# Patient Record
Sex: FEMALE | Race: WHITE | Employment: FULL TIME | URBAN - METROPOLITAN AREA
[De-identification: names, ages, dates, MRNs, and addresses within clinical notes are randomized per-mention and may not be internally consistent; named-entity substitution may affect disease eponyms.]

---

## 2017-01-06 ENCOUNTER — HOSPITAL ENCOUNTER (OUTPATIENT)
Dept: MRI IMAGING | Facility: HOSPITAL | Age: 42
Discharge: HOME | End: 2017-01-06
Attending: PHYSICIAN ASSISTANT
Payer: COMMERCIAL

## 2017-01-06 DIAGNOSIS — M54.2 NECK PAIN: ICD-10-CM

## 2017-01-06 PROCEDURE — 72141 MRI NECK SPINE W/O DYE: CPT

## 2017-01-16 ENCOUNTER — HOSPITAL ENCOUNTER (EMERGENCY)
Facility: HOSPITAL | Age: 42
Discharge: HOME | End: 2017-01-16
Attending: EMERGENCY MEDICINE
Payer: COMMERCIAL

## 2017-01-16 VITALS
RESPIRATION RATE: 16 BRPM | SYSTOLIC BLOOD PRESSURE: 133 MMHG | OXYGEN SATURATION: 100 % | HEART RATE: 71 BPM | DIASTOLIC BLOOD PRESSURE: 92 MMHG

## 2017-01-16 DIAGNOSIS — G89.29 CHRONIC MIDLINE LOW BACK PAIN WITHOUT SCIATICA: Primary | ICD-10-CM

## 2017-01-16 DIAGNOSIS — M54.50 CHRONIC MIDLINE LOW BACK PAIN WITHOUT SCIATICA: Primary | ICD-10-CM

## 2017-01-16 PROCEDURE — 99283 EMERGENCY DEPT VISIT LOW MDM: CPT

## 2017-01-16 RX ORDER — HYDROCODONE BITARTRATE AND ACETAMINOPHEN 5; 325 MG/1; MG/1
1-2 TABLET ORAL EVERY 6 HOURS PRN
Qty: 20 TABLET | Refills: 0 | Status: SHIPPED | OUTPATIENT
Start: 2017-01-16 | End: 2017-01-23

## 2017-01-16 RX ORDER — GABAPENTIN 300 MG/1
300 CAPSULE ORAL 3 TIMES DAILY
COMMUNITY
End: 2017-07-05

## 2017-01-16 RX ORDER — HYDROCODONE BITARTRATE AND ACETAMINOPHEN 5; 325 MG/1; MG/1
2 TABLET ORAL ONCE
Status: COMPLETED | OUTPATIENT
Start: 2017-01-16 | End: 2017-01-16

## 2017-01-16 NOTE — ED PROVIDER NOTES
I reviewed that chart and discussed the case with the physician assistant. I have examined the patient and noted patient will be given a few pain medications. She definitely needs to follow-up with her pain physician. .    I agree with the physician radha

## 2017-01-16 NOTE — ED INITIAL ASSESSMENT (HPI)
Pt with a hx of chronic back pain, two bulging discs.  Pt presents today with increased back pain after failed epidural.

## 2017-01-16 NOTE — ED PROVIDER NOTES
Patient Seen in: [facility] Emergency Department    History   Patient presents with:  Back Pain (musculoskeletal)    Stated Complaint: back pain    HPI    The patient is a 19-year-old female who presents today for chronic back pain and neck pain.   [place] mouth daily with breakfast.   celecoxib (CELEBREX) 200 MG Oral Cap,  Take 1 capsule (200 mg total) by mouth 2 (two) times daily. Cetirizine HCl (ZYRTEC OR),  Take  by mouth.        Family History   Problem Relation Age of Onset   • Arthritis [facility] [place] exhibits tenderness. Negative straight leg raise bilaterally. Patient is moving freely on exam table. Neurological: She is alert and oriented to person, place, and time. No cranial nerve deficit. She exhibits normal muscle tone.  Coordination normal.

## 2017-01-17 ENCOUNTER — HOSPITAL (OUTPATIENT)
Dept: OTHER | Age: 42
End: 2017-01-17
Attending: INTERNAL MEDICINE

## 2017-02-20 ENCOUNTER — HOSPITAL ENCOUNTER (OUTPATIENT)
Dept: GENERAL RADIOLOGY | Facility: HOSPITAL | Age: 42
Discharge: HOME | End: 2017-02-20
Attending: SURGERY
Payer: COMMERCIAL

## 2017-02-20 ENCOUNTER — LAB ENCOUNTER (OUTPATIENT)
Dept: LAB | Facility: HOSPITAL | Age: 42
End: 2017-02-20
Attending: SURGERY
Payer: COMMERCIAL

## 2017-02-20 DIAGNOSIS — E78.5 DYSLIPIDEMIA: Primary | ICD-10-CM

## 2017-02-20 DIAGNOSIS — M25.561 BILATERAL KNEE PAIN: ICD-10-CM

## 2017-02-20 DIAGNOSIS — M25.562 BILATERAL KNEE PAIN: ICD-10-CM

## 2017-02-20 DIAGNOSIS — M79.10 MYALGIA: ICD-10-CM

## 2017-02-20 DIAGNOSIS — E55.9 VITAMIN D DEFICIENCY: ICD-10-CM

## 2017-02-20 DIAGNOSIS — M25.561 KNEE PAIN, RIGHT: ICD-10-CM

## 2017-02-20 DIAGNOSIS — M25.562 LEFT KNEE PAIN: ICD-10-CM

## 2017-02-20 LAB
25-HYDROXYVITAMIN D (TOTAL): 27 NG/ML (ref 30–100)
ALBUMIN SERPL-MCNC: 3.3 G/DL (ref 3.5–4.8)
ALP LIVER SERPL-CCNC: 97 U/L (ref 37–98)
ALT SERPL-CCNC: 29 U/L (ref 14–54)
AST SERPL-CCNC: 11 U/L (ref 15–41)
BASOPHILS # BLD AUTO: 0.08 X10(3) UL (ref 0–0.1)
BASOPHILS NFR BLD AUTO: 0.9 %
BILIRUB SERPL-MCNC: 0.3 MG/DL (ref 0.1–2)
BUN BLD-MCNC: 14 MG/DL (ref 8–20)
CALCIUM BLD-MCNC: 8.2 MG/DL (ref 8.3–10.3)
CHLORIDE: 108 MMOL/L (ref 101–111)
CHOLEST SMN-MCNC: 176 MG/DL (ref ?–200)
CO2: 26 MMOL/L (ref 22–32)
CREAT BLD-MCNC: 0.67 MG/DL (ref 0.55–1.02)
DEPRECATED HBV CORE AB SER IA-ACNC: 9.9 NG/ML (ref 10–291)
EOSINOPHIL # BLD AUTO: 0.12 X10(3) UL (ref 0–0.3)
EOSINOPHIL NFR BLD AUTO: 1.4 %
ERYTHROCYTE [DISTWIDTH] IN BLOOD BY AUTOMATED COUNT: 13.5 % (ref 11.5–16)
FOLATE (FOLIC ACID), SERUM: 9.3 NG/ML (ref 8.7–24)
GLUCOSE BLD-MCNC: 83 MG/DL (ref 70–99)
HAV AB SERPL IA-ACNC: 544 PG/ML (ref 193–986)
HCT VFR BLD AUTO: 40.9 % (ref 34–50)
HDLC SERPL-MCNC: 60 MG/DL (ref 45–?)
HDLC SERPL: 2.93 {RATIO} (ref ?–4.44)
HGB BLD-MCNC: 13.1 G/DL (ref 12–16)
IMMATURE GRANULOCYTE COUNT: 0.03 X10(3) UL (ref 0–1)
IMMATURE GRANULOCYTE RATIO %: 0.4 %
IRON SATURATION: 14 % (ref 13–45)
IRON: 53 UG/DL (ref 28–170)
LDLC SERPL CALC-MCNC: 99 MG/DL (ref ?–130)
LYMPHOCYTES # BLD AUTO: 2.38 X10(3) UL (ref 0.9–4)
LYMPHOCYTES NFR BLD AUTO: 27.9 %
M PROTEIN MFR SERPL ELPH: 7 G/DL (ref 6.1–8.3)
MCH RBC QN AUTO: 27 PG (ref 27–33.2)
MCHC RBC AUTO-ENTMCNC: 32 G/DL (ref 31–37)
MCV RBC AUTO: 84.3 FL (ref 81–100)
MONOCYTES # BLD AUTO: 0.53 X10(3) UL (ref 0.1–0.6)
MONOCYTES NFR BLD AUTO: 6.2 %
NEUTROPHIL ABS PRELIM: 5.39 X10 (3) UL (ref 1.3–6.7)
NEUTROPHILS # BLD AUTO: 5.39 X10(3) UL (ref 1.3–6.7)
NEUTROPHILS NFR BLD AUTO: 63.2 %
NONHDLC SERPL-MCNC: 116 MG/DL (ref ?–130)
PHOSPHATE SERPL-MCNC: 3.8 MG/DL (ref 2.5–4.9)
PLATELET # BLD AUTO: 254 10(3)UL (ref 150–450)
POTASSIUM SERPL-SCNC: 3.6 MMOL/L (ref 3.6–5.1)
PTH-INTACT SERPL-MCNC: 103.7 PG/ML (ref 11.1–79.5)
RBC # BLD AUTO: 4.85 X10(6)UL (ref 3.8–5.1)
RED CELL DISTRIBUTION WIDTH-SD: 41.6 FL (ref 35.1–46.3)
SODIUM SERPL-SCNC: 142 MMOL/L (ref 136–144)
TOTAL IRON BINDING CAPACITY: 380 UG/DL (ref 298–536)
TRANSFERRIN: 255 MG/DL (ref 200–360)
TRIGLYCERIDES: 86 MG/DL (ref ?–150)
TSI SER-ACNC: 1.41 MIU/ML (ref 0.35–5.5)
VLDL: 17 MG/DL (ref 5–40)
WBC # BLD AUTO: 8.5 X10(3) UL (ref 4–13)

## 2017-02-20 PROCEDURE — 83970 ASSAY OF PARATHORMONE: CPT

## 2017-02-20 PROCEDURE — 73560 X-RAY EXAM OF KNEE 1 OR 2: CPT

## 2017-02-20 PROCEDURE — 82607 VITAMIN B-12: CPT

## 2017-02-20 PROCEDURE — 82728 ASSAY OF FERRITIN: CPT

## 2017-02-20 PROCEDURE — 36415 COLL VENOUS BLD VENIPUNCTURE: CPT

## 2017-02-20 PROCEDURE — 85025 COMPLETE CBC W/AUTO DIFF WBC: CPT

## 2017-02-20 PROCEDURE — 80053 COMPREHEN METABOLIC PANEL: CPT

## 2017-02-20 PROCEDURE — 82652 VIT D 1 25-DIHYDROXY: CPT

## 2017-02-20 PROCEDURE — 83550 IRON BINDING TEST: CPT

## 2017-02-20 PROCEDURE — 84443 ASSAY THYROID STIM HORMONE: CPT

## 2017-02-20 PROCEDURE — 83540 ASSAY OF IRON: CPT

## 2017-02-20 PROCEDURE — 82306 VITAMIN D 25 HYDROXY: CPT

## 2017-02-20 PROCEDURE — 82746 ASSAY OF FOLIC ACID SERUM: CPT

## 2017-02-20 PROCEDURE — 80061 LIPID PANEL: CPT

## 2017-02-20 PROCEDURE — 84425 ASSAY OF VITAMIN B-1: CPT

## 2017-02-20 PROCEDURE — 84100 ASSAY OF PHOSPHORUS: CPT

## 2017-02-22 LAB — 1,25-DIHYDROXYVITAMIN D: 85 PG/ML

## 2017-02-23 LAB — VITAMIN B1 (THIAMINE), WHOLE B: 129 NMOL/L

## 2017-02-24 PROBLEM — M79.10 MYALGIA: Status: ACTIVE | Noted: 2017-02-24

## 2017-03-10 ENCOUNTER — HOSPITAL ENCOUNTER (OUTPATIENT)
Dept: ULTRASOUND IMAGING | Facility: HOSPITAL | Age: 42
Discharge: HOME | End: 2017-03-10
Attending: OBSTETRICS & GYNECOLOGY
Payer: COMMERCIAL

## 2017-03-10 DIAGNOSIS — N94.6 DYSMENORRHEA: ICD-10-CM

## 2017-03-10 DIAGNOSIS — R10.32 ABDOMINAL PAIN, LEFT LOWER QUADRANT: ICD-10-CM

## 2017-03-10 DIAGNOSIS — D25.1 INTRAMURAL LEIOMYOMA OF UTERUS: ICD-10-CM

## 2017-03-10 PROCEDURE — 76830 TRANSVAGINAL US NON-OB: CPT

## 2017-03-10 PROCEDURE — 76856 US EXAM PELVIC COMPLETE: CPT

## 2017-03-26 ENCOUNTER — HOSPITAL ENCOUNTER (EMERGENCY)
Facility: HOSPITAL | Age: 42
Discharge: HOME | End: 2017-03-26
Attending: EMERGENCY MEDICINE
Payer: COMMERCIAL

## 2017-03-26 VITALS
DIASTOLIC BLOOD PRESSURE: 75 MMHG | TEMPERATURE: 98 F | HEART RATE: 88 BPM | SYSTOLIC BLOOD PRESSURE: 118 MMHG | OXYGEN SATURATION: 100 % | HEIGHT: 67.99 IN | WEIGHT: 293 LBS | BODY MASS INDEX: 44.41 KG/M2 | RESPIRATION RATE: 20 BRPM

## 2017-03-26 DIAGNOSIS — G89.29 CHRONIC MIDLINE LOW BACK PAIN WITHOUT SCIATICA: Primary | ICD-10-CM

## 2017-03-26 DIAGNOSIS — M54.50 CHRONIC MIDLINE LOW BACK PAIN WITHOUT SCIATICA: Primary | ICD-10-CM

## 2017-03-26 DIAGNOSIS — N39.0 URINARY TRACT INFECTION, SITE UNSPECIFIED: ICD-10-CM

## 2017-03-26 LAB
BILIRUB UR QL STRIP.AUTO: NEGATIVE
COLOR UR AUTO: YELLOW
GLUCOSE UR STRIP.AUTO-MCNC: NEGATIVE MG/DL
KETONES UR STRIP.AUTO-MCNC: NEGATIVE MG/DL
NITRITE UR QL STRIP.AUTO: NEGATIVE
PH UR STRIP.AUTO: 6 [PH] (ref 4.5–8)
POCT URINE PREGNANCY: NEGATIVE
PROT UR STRIP.AUTO-MCNC: NEGATIVE MG/DL
RBC UR QL AUTO: NEGATIVE
SP GR UR STRIP.AUTO: 1.01 (ref 1–1.03)
UROBILINOGEN UR STRIP.AUTO-MCNC: 4 MG/DL

## 2017-03-26 PROCEDURE — 81025 URINE PREGNANCY TEST: CPT

## 2017-03-26 PROCEDURE — 81001 URINALYSIS AUTO W/SCOPE: CPT | Performed by: EMERGENCY MEDICINE

## 2017-03-26 PROCEDURE — 87086 URINE CULTURE/COLONY COUNT: CPT | Performed by: EMERGENCY MEDICINE

## 2017-03-26 PROCEDURE — 99283 EMERGENCY DEPT VISIT LOW MDM: CPT

## 2017-03-26 RX ORDER — HYDROCODONE BITARTRATE AND ACETAMINOPHEN 5; 325 MG/1; MG/1
1 TABLET ORAL EVERY 4 HOURS PRN
Qty: 20 TABLET | Refills: 0 | Status: SHIPPED | OUTPATIENT
Start: 2017-03-26 | End: 2017-04-03 | Stop reason: ALTCHOICE

## 2017-03-26 RX ORDER — SULFAMETHOXAZOLE AND TRIMETHOPRIM 800; 160 MG/1; MG/1
1 TABLET ORAL 2 TIMES DAILY
Qty: 6 TABLET | Refills: 0 | Status: SHIPPED | OUTPATIENT
Start: 2017-03-26 | End: 2017-03-29

## 2017-03-26 NOTE — ED PROVIDER NOTES
Patient Seen in: [facility] Emergency Department    History   Patient presents with:  Back Pain (musculoskeletal)    Stated Complaint: lower back pain    HPI    Patient is a pleasant 19-year-old female, presenting for evaluation of low back pain.   P by mouth 2 (two) times daily. PEG 3350-KCl-NaBcb-NaCl-NaSulf (GAVILYTE-C) 240 g Oral Recon Soln,  Use as directed   Etodolac 500 MG Oral Tab,  Take 1 tablet (500 mg total) by mouth 2 (two) times daily.    Cholecalciferol (VITAMIN D) 2000 units Oral Cap, awake and alert, sitting on the cart, in no apparent distress. HEENT: Head is without evidence of trauma. Oropharynx is pink and moist.  NECK: Neck is supple. The trachea is midline. LUNGS: Respirations are unlabored. HEART: Regular rate and rhythm. diagnosis)  Urinary tract infection, site unspecified    Disposition:  Discharge    Follow-up:  Rachael Tucker 46  [phone]    Schedule an appointment as soon as possible for a visit in 3 days        [address] [place] [address]

## 2017-03-26 NOTE — ED INITIAL ASSESSMENT (HPI)
Lower back pain x2 days. Pt states has chronic back problems. 2 bulging discs in lower back. Pt sees pain specialist.  Had epidural done 2 months ago. Pt states epidural no longer working. Has tramadol also.

## 2017-03-27 ENCOUNTER — HOSPITAL ENCOUNTER (EMERGENCY)
Facility: HOSPITAL | Age: 42
Discharge: HOME | End: 2017-03-27
Attending: EMERGENCY MEDICINE
Payer: COMMERCIAL

## 2017-03-27 VITALS
DIASTOLIC BLOOD PRESSURE: 80 MMHG | SYSTOLIC BLOOD PRESSURE: 121 MMHG | OXYGEN SATURATION: 97 % | TEMPERATURE: 97 F | WEIGHT: 293 LBS | HEART RATE: 106 BPM | BODY MASS INDEX: 44.41 KG/M2 | HEIGHT: 68 IN | RESPIRATION RATE: 20 BRPM

## 2017-03-27 DIAGNOSIS — M54.50 ACUTE BILATERAL LOW BACK PAIN WITHOUT SCIATICA: Primary | ICD-10-CM

## 2017-03-27 PROCEDURE — 99283 EMERGENCY DEPT VISIT LOW MDM: CPT

## 2017-03-27 RX ORDER — CYCLOBENZAPRINE HCL 10 MG
10 TABLET ORAL 3 TIMES DAILY PRN
Qty: 20 TABLET | Refills: 0 | Status: SHIPPED | OUTPATIENT
Start: 2017-03-27 | End: 2017-04-03

## 2017-03-27 RX ORDER — HYDROCODONE BITARTRATE AND ACETAMINOPHEN 10; 325 MG/1; MG/1
1 TABLET ORAL EVERY 4 HOURS PRN
Qty: 40 TABLET | Refills: 0 | Status: SHIPPED | OUTPATIENT
Start: 2017-03-27 | End: 2017-04-01

## 2017-03-28 NOTE — ED PROVIDER NOTES
Patient Seen in: [facility] Emergency Department    History   Patient presents with:  Back Pain (musculoskeletal)    Stated Complaint: BACK PAIN    HPI    44-year-old female presents to the emergency department for evaluation of nonradiating low ronak Take 1 tablet by mouth every 4 (four) hours as needed for Pain. Sulfamethoxazole-TMP -160 MG Oral Tab per tablet,  Take 1 tablet by mouth 2 (two) times daily.    PEG 3350-KCl-NaBcb-NaCl-NaSulf (GAVILYTE-C) 240 g Oral Recon Soln,  Use as directed   E 97%  LMP 03/04/2017        Physical Exam    General appearance: This is a middle-aged female lying in a gurney. Vital signs were reviewed per nurse's notes. HEENT: Normocephalic atraumatic. Anicteric sclera.   Oral mucosa is moist.  Oropharynx is normal.

## 2017-03-28 NOTE — ED INITIAL ASSESSMENT (HPI)
Arrives with c/o low back pain. Was seen here a couple of days ago, given Norco 5/325 but states had to double up on them and is now out. Had an epidural a couple of months ago without relief. No pain down legs. No loss of control of bowel or bladder.

## 2017-03-30 PROCEDURE — 86812 HLA TYPING A B OR C: CPT | Performed by: INTERNAL MEDICINE

## 2017-04-03 PROBLEM — G47.33 OSA (OBSTRUCTIVE SLEEP APNEA): Status: ACTIVE | Noted: 2017-04-03

## 2017-04-11 PROBLEM — M79.18 MYOFASCIAL PAIN SYNDROME: Status: ACTIVE | Noted: 2017-04-11

## 2017-04-11 PROBLEM — M62.81 QUADRICEPS WEAKNESS: Status: ACTIVE | Noted: 2017-04-11

## 2017-04-11 PROBLEM — R29.898 WEAKNESS OF RIGHT HIP: Status: ACTIVE | Noted: 2017-04-11

## 2017-04-12 ENCOUNTER — HOSPITAL ENCOUNTER (EMERGENCY)
Facility: HOSPITAL | Age: 42
Discharge: HOME | End: 2017-04-12
Attending: EMERGENCY MEDICINE
Payer: COMMERCIAL

## 2017-04-12 VITALS
RESPIRATION RATE: 14 BRPM | SYSTOLIC BLOOD PRESSURE: 107 MMHG | TEMPERATURE: 98 F | DIASTOLIC BLOOD PRESSURE: 66 MMHG | HEART RATE: 105 BPM | WEIGHT: 293 LBS | BODY MASS INDEX: 44.41 KG/M2 | OXYGEN SATURATION: 96 % | HEIGHT: 68 IN

## 2017-04-12 DIAGNOSIS — M25.569 ACUTE KNEE PAIN, UNSPECIFIED LATERALITY: ICD-10-CM

## 2017-04-12 DIAGNOSIS — G89.29 ACUTE EXACERBATION OF CHRONIC LOW BACK PAIN: Primary | ICD-10-CM

## 2017-04-12 DIAGNOSIS — M54.50 ACUTE EXACERBATION OF CHRONIC LOW BACK PAIN: Primary | ICD-10-CM

## 2017-04-12 PROCEDURE — 96372 THER/PROPH/DIAG INJ SC/IM: CPT

## 2017-04-12 PROCEDURE — 99283 EMERGENCY DEPT VISIT LOW MDM: CPT

## 2017-04-12 RX ORDER — HYDROCODONE BITARTRATE AND ACETAMINOPHEN 5; 325 MG/1; MG/1
1-2 TABLET ORAL EVERY 4 HOURS PRN
Qty: 4 TABLET | Refills: 0 | Status: SHIPPED | OUTPATIENT
Start: 2017-04-12 | End: 2017-04-22

## 2017-04-12 RX ORDER — HYDROCODONE BITARTRATE AND ACETAMINOPHEN 5; 325 MG/1; MG/1
1-2 TABLET ORAL EVERY 4 HOURS PRN
Qty: 20 TABLET | Refills: 0 | OUTPATIENT
Start: 2017-04-12 | End: 2017-04-12

## 2017-04-12 RX ORDER — DIAZEPAM 5 MG/1
TABLET ORAL EVERY 8 HOURS PRN
Qty: 20 TABLET | Refills: 0 | Status: SHIPPED | OUTPATIENT
Start: 2017-04-12 | End: 2017-04-22

## 2017-04-12 RX ORDER — KETOROLAC TROMETHAMINE 30 MG/ML
60 INJECTION, SOLUTION INTRAMUSCULAR; INTRAVENOUS ONCE
Status: COMPLETED | OUTPATIENT
Start: 2017-04-12 | End: 2017-04-12

## 2017-04-12 NOTE — ED INITIAL ASSESSMENT (HPI)
Pt with ongoing pain of the knee and back pain. Pt was seen at pain clinic and received a shot in her knee for pain but her back is really bothering her now so here for pain relief.

## 2017-04-13 NOTE — ED PROVIDER NOTES
Patient Seen in: [facility] Emergency Department    History   Patient presents with:  Back Pain (musculoskeletal)  Lower Extremity Injury (musculoskeletal)    Stated Complaint: back and rt knee pain    HPI    Patient has some chronic knee and back p Oral Recon Soln,  Use as directed   Cholecalciferol (VITAMIN D) 2000 units Oral Cap,  Take by mouth daily. Acidophilus/Pectin Oral Cap,  Take 1 capsule by mouth daily. Lurasidone HCl 20 MG Oral Tab,  Take 60 mg by mouth daily.    Omeprazole 40 MG Oral C lymphadenopathy, no tenderness, swelling, deformity   Cardiovascular: Regular rate and rhythm, normal S1 and S2, no murmurs, rubs, or gallops   Lungs: Clear to auscultation bilaterally with equal breath sounds, no wheezing, no rhonchi   Abdomen: Positive b both see specialists in that area.         Disposition and Plan     Clinical Impression:  Acute exacerbation of chronic low back pain  (primary encounter diagnosis)  Acute knee pain, unspecified laterality    Disposition:  Discharge    Follow-up:  Ami Cramer

## 2017-04-22 ENCOUNTER — LAB ENCOUNTER (OUTPATIENT)
Dept: LAB | Facility: HOSPITAL | Age: 42
End: 2017-04-22
Attending: OBSTETRICS & GYNECOLOGY
Payer: COMMERCIAL

## 2017-04-22 ENCOUNTER — HOSPITAL ENCOUNTER (OUTPATIENT)
Dept: GENERAL RADIOLOGY | Facility: HOSPITAL | Age: 42
Discharge: HOME | End: 2017-04-22
Attending: INTERNAL MEDICINE
Payer: COMMERCIAL

## 2017-04-22 ENCOUNTER — APPOINTMENT (OUTPATIENT)
Dept: LAB | Facility: HOSPITAL | Age: 42
End: 2017-04-22
Payer: COMMERCIAL

## 2017-04-22 DIAGNOSIS — N92.0 MENORRHAGIA: ICD-10-CM

## 2017-04-22 DIAGNOSIS — M25.50 ARTHRALGIA, UNSPECIFIED JOINT: ICD-10-CM

## 2017-04-22 DIAGNOSIS — N94.6 DYSMENORRHEA: ICD-10-CM

## 2017-04-22 DIAGNOSIS — D25.9 FIBROID UTERUS: ICD-10-CM

## 2017-04-22 PROCEDURE — 36415 COLL VENOUS BLD VENIPUNCTURE: CPT

## 2017-04-22 PROCEDURE — 73130 X-RAY EXAM OF HAND: CPT

## 2017-04-22 PROCEDURE — 73630 X-RAY EXAM OF FOOT: CPT

## 2017-04-22 PROCEDURE — 93010 ELECTROCARDIOGRAM REPORT: CPT | Performed by: INTERNAL MEDICINE

## 2017-04-22 PROCEDURE — 85025 COMPLETE CBC W/AUTO DIFF WBC: CPT

## 2017-04-22 PROCEDURE — 86900 BLOOD TYPING SEROLOGIC ABO: CPT

## 2017-04-22 PROCEDURE — 86901 BLOOD TYPING SEROLOGIC RH(D): CPT

## 2017-04-22 PROCEDURE — 86850 RBC ANTIBODY SCREEN: CPT

## 2017-04-22 PROCEDURE — 93005 ELECTROCARDIOGRAM TRACING: CPT

## 2017-05-05 ENCOUNTER — ANESTHESIA (OUTPATIENT)
Dept: SURGERY | Facility: HOSPITAL | Age: 42
End: 2017-05-05
Payer: COMMERCIAL

## 2017-05-05 ENCOUNTER — SURGERY (OUTPATIENT)
Age: 42
End: 2017-05-05

## 2017-05-05 ENCOUNTER — HOSPITAL ENCOUNTER (OUTPATIENT)
Facility: HOSPITAL | Age: 42
Setting detail: OBSERVATION
Discharge: HOME | End: 2017-05-06
Attending: OBSTETRICS & GYNECOLOGY | Admitting: OBSTETRICS & GYNECOLOGY
Payer: COMMERCIAL

## 2017-05-05 ENCOUNTER — ANESTHESIA EVENT (OUTPATIENT)
Dept: SURGERY | Facility: HOSPITAL | Age: 42
End: 2017-05-05
Payer: COMMERCIAL

## 2017-05-05 DIAGNOSIS — N92.0 MENORRHAGIA: Primary | ICD-10-CM

## 2017-05-05 DIAGNOSIS — N94.6 DYSMENORRHEA: ICD-10-CM

## 2017-05-05 DIAGNOSIS — D25.9 FIBROID UTERUS: ICD-10-CM

## 2017-05-05 PROCEDURE — 88307 TISSUE EXAM BY PATHOLOGIST: CPT | Performed by: OBSTETRICS & GYNECOLOGY

## 2017-05-05 PROCEDURE — 0UTC4ZZ RESECTION OF CERVIX, PERCUTANEOUS ENDOSCOPIC APPROACH: ICD-10-PCS | Performed by: OBSTETRICS & GYNECOLOGY

## 2017-05-05 PROCEDURE — 0UT74ZZ RESECTION OF BILATERAL FALLOPIAN TUBES, PERCUTANEOUS ENDOSCOPIC APPROACH: ICD-10-PCS | Performed by: OBSTETRICS & GYNECOLOGY

## 2017-05-05 PROCEDURE — 81025 URINE PREGNANCY TEST: CPT | Performed by: OBSTETRICS & GYNECOLOGY

## 2017-05-05 PROCEDURE — 0UT94ZZ RESECTION OF UTERUS, PERCUTANEOUS ENDOSCOPIC APPROACH: ICD-10-PCS | Performed by: OBSTETRICS & GYNECOLOGY

## 2017-05-05 RX ORDER — HYDROCODONE BITARTRATE AND ACETAMINOPHEN 5; 325 MG/1; MG/1
2 TABLET ORAL EVERY 4 HOURS PRN
Status: DISCONTINUED | OUTPATIENT
Start: 2017-05-05 | End: 2017-05-06

## 2017-05-05 RX ORDER — HYDROCODONE BITARTRATE AND ACETAMINOPHEN 10; 325 MG/1; MG/1
1 TABLET ORAL AS NEEDED
Status: DISCONTINUED | OUTPATIENT
Start: 2017-05-05 | End: 2017-05-05 | Stop reason: HOSPADM

## 2017-05-05 RX ORDER — ONDANSETRON 2 MG/ML
4 INJECTION INTRAMUSCULAR; INTRAVENOUS EVERY 8 HOURS PRN
Status: DISCONTINUED | OUTPATIENT
Start: 2017-05-05 | End: 2017-05-06

## 2017-05-05 RX ORDER — HYDROCODONE BITARTRATE AND ACETAMINOPHEN 10; 325 MG/1; MG/1
2 TABLET ORAL AS NEEDED
Status: DISCONTINUED | OUTPATIENT
Start: 2017-05-05 | End: 2017-05-05 | Stop reason: HOSPADM

## 2017-05-05 RX ORDER — ONDANSETRON 4 MG/1
4 TABLET, FILM COATED ORAL EVERY 8 HOURS PRN
Status: DISCONTINUED | OUTPATIENT
Start: 2017-05-05 | End: 2017-05-06

## 2017-05-05 RX ORDER — METOCLOPRAMIDE HYDROCHLORIDE 5 MG/ML
10 INJECTION INTRAMUSCULAR; INTRAVENOUS AS NEEDED
Status: DISCONTINUED | OUTPATIENT
Start: 2017-05-05 | End: 2017-05-05 | Stop reason: HOSPADM

## 2017-05-05 RX ORDER — HYDROCODONE BITARTRATE AND ACETAMINOPHEN 5; 325 MG/1; MG/1
1 TABLET ORAL EVERY 4 HOURS PRN
Status: DISCONTINUED | OUTPATIENT
Start: 2017-05-05 | End: 2017-05-06

## 2017-05-05 RX ORDER — HYDROMORPHONE HYDROCHLORIDE 1 MG/ML
INJECTION, SOLUTION INTRAMUSCULAR; INTRAVENOUS; SUBCUTANEOUS
Status: COMPLETED
Start: 2017-05-05 | End: 2017-05-05

## 2017-05-05 RX ORDER — HYDROMORPHONE HYDROCHLORIDE 1 MG/ML
0.4 INJECTION, SOLUTION INTRAMUSCULAR; INTRAVENOUS; SUBCUTANEOUS EVERY 5 MIN PRN
Status: DISCONTINUED | OUTPATIENT
Start: 2017-05-05 | End: 2017-05-05 | Stop reason: HOSPADM

## 2017-05-05 RX ORDER — PANTOPRAZOLE SODIUM 20 MG/1
20 TABLET, DELAYED RELEASE ORAL
Status: DISCONTINUED | OUTPATIENT
Start: 2017-05-06 | End: 2017-05-06

## 2017-05-05 RX ORDER — KETOROLAC TROMETHAMINE 30 MG/ML
30 INJECTION, SOLUTION INTRAMUSCULAR; INTRAVENOUS EVERY 6 HOURS PRN
Status: DISCONTINUED | OUTPATIENT
Start: 2017-05-05 | End: 2017-05-06

## 2017-05-05 RX ORDER — SODIUM CHLORIDE, SODIUM LACTATE, POTASSIUM CHLORIDE, CALCIUM CHLORIDE 600; 310; 30; 20 MG/100ML; MG/100ML; MG/100ML; MG/100ML
INJECTION, SOLUTION INTRAVENOUS CONTINUOUS
Status: DISCONTINUED | OUTPATIENT
Start: 2017-05-05 | End: 2017-05-05 | Stop reason: HOSPADM

## 2017-05-05 RX ORDER — HYDROMORPHONE HYDROCHLORIDE 1 MG/ML
1 INJECTION, SOLUTION INTRAMUSCULAR; INTRAVENOUS; SUBCUTANEOUS EVERY 2 HOUR PRN
Status: DISCONTINUED | OUTPATIENT
Start: 2017-05-05 | End: 2017-05-06

## 2017-05-05 RX ORDER — MIDAZOLAM HYDROCHLORIDE 1 MG/ML
INJECTION INTRAMUSCULAR; INTRAVENOUS
Status: COMPLETED
Start: 2017-05-05 | End: 2017-05-05

## 2017-05-05 RX ORDER — KETOROLAC TROMETHAMINE 15 MG/ML
15 INJECTION, SOLUTION INTRAMUSCULAR; INTRAVENOUS EVERY 6 HOURS PRN
Status: DISCONTINUED | OUTPATIENT
Start: 2017-05-05 | End: 2017-05-06

## 2017-05-05 RX ORDER — MIDAZOLAM HYDROCHLORIDE 1 MG/ML
1 INJECTION INTRAMUSCULAR; INTRAVENOUS EVERY 5 MIN PRN
Status: DISCONTINUED | OUTPATIENT
Start: 2017-05-05 | End: 2017-05-05 | Stop reason: HOSPADM

## 2017-05-05 RX ORDER — MEPERIDINE HYDROCHLORIDE 25 MG/ML
12.5 INJECTION INTRAMUSCULAR; INTRAVENOUS; SUBCUTANEOUS AS NEEDED
Status: DISCONTINUED | OUTPATIENT
Start: 2017-05-05 | End: 2017-05-05 | Stop reason: HOSPADM

## 2017-05-05 RX ORDER — BUPIVACAINE HYDROCHLORIDE 5 MG/ML
INJECTION, SOLUTION EPIDURAL; INTRACAUDAL AS NEEDED
Status: DISCONTINUED | OUTPATIENT
Start: 2017-05-05 | End: 2017-05-05 | Stop reason: HOSPADM

## 2017-05-05 RX ORDER — SIMETHICONE 80 MG
80 TABLET,CHEWABLE ORAL EVERY 8 HOURS PRN
Status: DISCONTINUED | OUTPATIENT
Start: 2017-05-05 | End: 2017-05-06

## 2017-05-05 RX ORDER — DEXTROSE AND SODIUM CHLORIDE 5; .9 G/100ML; G/100ML
INJECTION, SOLUTION INTRAVENOUS CONTINUOUS
Status: DISCONTINUED | OUTPATIENT
Start: 2017-05-05 | End: 2017-05-06

## 2017-05-05 RX ORDER — SODIUM CHLORIDE, SODIUM LACTATE, POTASSIUM CHLORIDE, CALCIUM CHLORIDE 600; 310; 30; 20 MG/100ML; MG/100ML; MG/100ML; MG/100ML
INJECTION, SOLUTION INTRAVENOUS CONTINUOUS
Status: DISCONTINUED | OUTPATIENT
Start: 2017-05-05 | End: 2017-05-06

## 2017-05-05 RX ORDER — NALOXONE HYDROCHLORIDE 0.4 MG/ML
80 INJECTION, SOLUTION INTRAMUSCULAR; INTRAVENOUS; SUBCUTANEOUS AS NEEDED
Status: DISCONTINUED | OUTPATIENT
Start: 2017-05-05 | End: 2017-05-05 | Stop reason: HOSPADM

## 2017-05-05 RX ORDER — METOCLOPRAMIDE HYDROCHLORIDE 5 MG/ML
10 INJECTION INTRAMUSCULAR; INTRAVENOUS EVERY 8 HOURS PRN
Status: DISCONTINUED | OUTPATIENT
Start: 2017-05-05 | End: 2017-05-06

## 2017-05-05 RX ORDER — IBUPROFEN 600 MG/1
600 TABLET ORAL EVERY 6 HOURS PRN
Status: DISCONTINUED | OUTPATIENT
Start: 2017-05-05 | End: 2017-05-06

## 2017-05-05 RX ORDER — HYDROMORPHONE HYDROCHLORIDE 1 MG/ML
0.5 INJECTION, SOLUTION INTRAMUSCULAR; INTRAVENOUS; SUBCUTANEOUS EVERY 2 HOUR PRN
Status: DISCONTINUED | OUTPATIENT
Start: 2017-05-05 | End: 2017-05-06

## 2017-05-05 RX ORDER — GABAPENTIN 300 MG/1
300 CAPSULE ORAL 3 TIMES DAILY
Status: DISCONTINUED | OUTPATIENT
Start: 2017-05-05 | End: 2017-05-06

## 2017-05-05 RX ORDER — ONDANSETRON 2 MG/ML
4 INJECTION INTRAMUSCULAR; INTRAVENOUS AS NEEDED
Status: DISCONTINUED | OUTPATIENT
Start: 2017-05-05 | End: 2017-05-05 | Stop reason: HOSPADM

## 2017-05-05 NOTE — ANESTHESIA PREPROCEDURE EVALUATION
PRE-OP EVALUATION    Patient Name: Archie Jackson    Pre-op Diagnosis: MENORRHAGIA, DYSMENORRHEA, FIBROID UTERUS     Procedure(s):  TOTAL LAPAROSCOPIC HYSTERECTOMY, BILATERAL SALPINGECTOMY    Surgeon(s) and Role:     * Geovanna Alatorre MD - Primary FOREARM/WRIST SURGERY UNLISTED Right 2012    Comment two procerdures - tendon repair & fusion of wrist    LAP ADJUSTABLE GASTRIC BAND  5/2/12    Comment GASTRIC SLEEVE    OTHER      Comment PARA OVARIAN CYST    REMOVAL OF OVARIAN CYST(S) Left 6/1/16

## 2017-05-05 NOTE — OPERATIVE REPORT
[facility]    PATIENT'S NAME: Kody Oh   ATTENDING PHYSICIAN: Yony Jackson M.D. OPERATING PHYSICIAN: Yony Jackson M.D.    PATIENT ACCOUNT#:   [de-identified]    LOCATION:  [address] [place] [address]  MEDICAL RECORD #:   [id number]       DATE OF BIRTH:  1 intraabdominal cavity. Saline drop test was used to confirm intraabdominal placement. CO2 gas line was attached to the Veress needle. Abdomen was insufflated with warm carbon dioxide gas until 15 mmHg pressure was reached.   The Veress needle was removed was desiccated and transected with Harmonic scalpel. Anterior leaf of the broad ligament was incised toward the midline to complete the bladder flap. The bladder was pushed down to the level of the inner cup that was able to be felt through the wall.   Rogers Mitchell

## 2017-05-05 NOTE — ANESTHESIA POSTPROCEDURE EVALUATION
[address]  Patient Status:  Outpatient in a Bed   Age/Gender 39year old female MRN [id number]   Location [facility] Attending Ros Ramires MD   Hosp Day # 0 PCP MD Mary Beth Corado [facility]

## 2017-05-05 NOTE — PROGRESS NOTES
Patient assisted up to bathroom and tolerated it well. Voided 600 cc clear yellow urine without difficulty.

## 2017-05-05 NOTE — BRIEF OP NOTE
Pre-Operative Diagnosis: MENORRHAGIA, DYSMENORRHEA, FIBROID UTERUS      Post-Operative Diagnosis: MENORRHAGIA, DYSMENORRHEA, FIBROID UTERUS      Procedure Performed:   Procedure(s):  TOTAL LAPAROSCOPIC HYSTERECTOMY, BILATERAL SALPINGECTOMY    Surgeon(s)

## 2017-05-06 VITALS
SYSTOLIC BLOOD PRESSURE: 133 MMHG | HEIGHT: 68 IN | HEART RATE: 72 BPM | RESPIRATION RATE: 18 BRPM | TEMPERATURE: 99 F | BODY MASS INDEX: 44.41 KG/M2 | WEIGHT: 293 LBS | OXYGEN SATURATION: 99 % | DIASTOLIC BLOOD PRESSURE: 77 MMHG

## 2017-05-06 PROBLEM — N92.0 MENORRHAGIA: Status: ACTIVE | Noted: 2017-05-06

## 2017-05-06 PROCEDURE — 85027 COMPLETE CBC AUTOMATED: CPT | Performed by: OBSTETRICS & GYNECOLOGY

## 2017-05-06 RX ORDER — HYDROCODONE BITARTRATE AND ACETAMINOPHEN 5; 325 MG/1; MG/1
1-2 TABLET ORAL EVERY 4 HOURS PRN
Qty: 30 TABLET | Refills: 0 | Status: SHIPPED | OUTPATIENT
Start: 2017-05-06 | End: 2017-05-11

## 2017-05-06 NOTE — PAYOR COMM NOTE
Pre-Operative Diagnosis: MENORRHAGIA, DYSMENORRHEA, FIBROID UTERUS      Post-Operative Diagnosis: MENORRHAGIA, DYSMENORRHEA, FIBROID UTERUS      Procedure Performed:    Procedure(s):  TOTAL LAPAROSCOPIC HYSTERECTOMY, BILATERAL SALPINGECTOMY    Selina Rodriguez

## 2017-05-06 NOTE — PROGRESS NOTES
DISCHARGED TO HOME PER WHEELCHAIR AND ACCOMPANIED BY FAMILY, DISCHARGED WITH INSTRUCTIONS, RX AND INFORMATION FOR NORCO, PT VERBALIZES UNDERSTANDING OF ALL INSTRUCTIONS AND MEDICATIONS, PT HOME MED OF Ihor Schlatter RETURNED TO PT FROM PHARMACY BIN

## 2017-05-06 NOTE — DISCHARGE SUMMARY
Admission date: 5/5/17  Discharge date: 5/6/17  Admission diagnosis: menorrhagia, dysmenorrhea, fibroid uterus  Discharge diagnosis: same  Operative Procedure: total laparoscopic hysterectomy, bilateral salpingectomies  Surgeon: Sarai Jay [facility]

## 2017-05-06 NOTE — PROGRESS NOTES
S: pt without complaints.   no flatus yet  O: VS-Temp:  [98 °F (36.7 °C)-99.2 °F (37.3 °C)] 99.2 °F (37.3 °C)  Pulse:  [56-89] 72  Resp:  [18-32] 18  BP: ()/(53-81) 133/77 mmHg       I/O last 24 hours-  Intake/Output Summary (Last 24 hours) at 05/06/1

## 2017-06-03 PROCEDURE — 36415 COLL VENOUS BLD VENIPUNCTURE: CPT | Performed by: INTERNAL MEDICINE

## 2017-06-03 PROCEDURE — 83970 ASSAY OF PARATHORMONE: CPT | Performed by: INTERNAL MEDICINE

## 2017-06-03 PROCEDURE — 84100 ASSAY OF PHOSPHORUS: CPT | Performed by: INTERNAL MEDICINE

## 2017-06-03 PROCEDURE — 82565 ASSAY OF CREATININE: CPT | Performed by: INTERNAL MEDICINE

## 2017-06-03 PROCEDURE — 82310 ASSAY OF CALCIUM: CPT | Performed by: INTERNAL MEDICINE

## 2017-06-14 ENCOUNTER — APPOINTMENT (OUTPATIENT)
Dept: GENERAL RADIOLOGY | Facility: HOSPITAL | Age: 42
End: 2017-06-14
Attending: EMERGENCY MEDICINE
Payer: COMMERCIAL

## 2017-06-14 ENCOUNTER — HOSPITAL ENCOUNTER (EMERGENCY)
Facility: HOSPITAL | Age: 42
Discharge: HOME | End: 2017-06-14
Attending: EMERGENCY MEDICINE
Payer: COMMERCIAL

## 2017-06-14 VITALS
BODY MASS INDEX: 44.41 KG/M2 | OXYGEN SATURATION: 100 % | HEIGHT: 68 IN | SYSTOLIC BLOOD PRESSURE: 113 MMHG | WEIGHT: 293 LBS | HEART RATE: 70 BPM | RESPIRATION RATE: 16 BRPM | TEMPERATURE: 97 F | DIASTOLIC BLOOD PRESSURE: 73 MMHG

## 2017-06-14 DIAGNOSIS — S93.401A MODERATE ANKLE SPRAIN, RIGHT, INITIAL ENCOUNTER: Primary | ICD-10-CM

## 2017-06-14 PROCEDURE — 99283 EMERGENCY DEPT VISIT LOW MDM: CPT

## 2017-06-14 PROCEDURE — 73610 X-RAY EXAM OF ANKLE: CPT | Performed by: EMERGENCY MEDICINE

## 2017-06-14 RX ORDER — ACETAMINOPHEN AND CODEINE PHOSPHATE 300; 30 MG/1; MG/1
1 TABLET ORAL ONCE
Status: COMPLETED | OUTPATIENT
Start: 2017-06-14 | End: 2017-06-14

## 2017-06-14 RX ORDER — IBUPROFEN 600 MG/1
600 TABLET ORAL ONCE
Status: COMPLETED | OUTPATIENT
Start: 2017-06-14 | End: 2017-06-14

## 2017-06-14 NOTE — ED INITIAL ASSESSMENT (HPI)
Pt states she slipped x 30min prior to arrival walking out of her apartment. Pt denies LOC. Pt c/o pain to left ankle. Pt denies N/T.  +pulse. Skin intact.

## 2017-06-15 NOTE — ED PROVIDER NOTES
Patient Seen in: [facility] Emergency Department    History   Patient presents with:  Lower Extremity Injury (musculoskeletal)  Fall (musculoskeletal, neurologic)    Stated Complaint: lt ankle pain hx of fall    HPI    49-year-old female presents to TraMADol HCl 50 MG Oral Tab,  as needed. CALCIUM OR,  Take by mouth. IRON OR,     Cholecalciferol (VITAMIN D) 2000 units Oral Cap,  Take by mouth daily. Lurasidone HCl 20 MG Oral Tab,  Take 60 mg by mouth daily.    gabapentin 300 MG Oral Cap,  Take Mouth/Throat: Oropharynx is clear and moist.   Eyes: EOM are normal. Pupils are equal, round, and reactive to light. Neck: Normal range of motion. Neck supple. Cardiovascular: Normal rate, regular rhythm, normal heart sounds and intact distal pulses.

## 2017-06-20 PROBLEM — M25.571 ACUTE RIGHT ANKLE PAIN: Status: ACTIVE | Noted: 2017-06-20

## 2017-09-14 PROBLEM — S90.01XA CONTUSION OF RIGHT ANKLE: Status: ACTIVE | Noted: 2017-09-14

## 2017-09-14 PROBLEM — M25.871 IMPINGEMENT SYNDROME OF RIGHT ANKLE: Status: ACTIVE | Noted: 2017-09-14

## 2017-09-19 PROBLEM — G89.29 CHRONIC PAIN: Status: ACTIVE | Noted: 2017-09-19

## 2017-10-13 ENCOUNTER — HOSPITAL (OUTPATIENT)
Dept: OTHER | Age: 42
End: 2017-10-13
Attending: EMERGENCY MEDICINE

## 2018-04-25 ENCOUNTER — HOSPITAL (OUTPATIENT)
Dept: OTHER | Age: 43
End: 2018-04-25
Attending: EMERGENCY MEDICINE

## 2018-05-07 PROCEDURE — 82607 VITAMIN B-12: CPT | Performed by: INTERNAL MEDICINE

## 2018-05-15 PROCEDURE — 84075 ASSAY ALKALINE PHOSPHATASE: CPT | Performed by: INTERNAL MEDICINE

## 2018-05-15 PROCEDURE — 84080 ASSAY ALKALINE PHOSPHATASES: CPT | Performed by: INTERNAL MEDICINE

## 2018-05-15 PROCEDURE — 36415 COLL VENOUS BLD VENIPUNCTURE: CPT | Performed by: INTERNAL MEDICINE

## 2018-06-11 ENCOUNTER — APPOINTMENT (OUTPATIENT)
Dept: ULTRASOUND IMAGING | Facility: HOSPITAL | Age: 43
End: 2018-06-11
Attending: EMERGENCY MEDICINE
Payer: COMMERCIAL

## 2018-06-11 ENCOUNTER — HOSPITAL ENCOUNTER (EMERGENCY)
Facility: HOSPITAL | Age: 43
Discharge: HOME | End: 2018-06-11
Attending: EMERGENCY MEDICINE
Payer: COMMERCIAL

## 2018-06-11 VITALS
SYSTOLIC BLOOD PRESSURE: 123 MMHG | HEIGHT: 68 IN | BODY MASS INDEX: 44.41 KG/M2 | TEMPERATURE: 98 F | DIASTOLIC BLOOD PRESSURE: 73 MMHG | RESPIRATION RATE: 18 BRPM | HEART RATE: 84 BPM | OXYGEN SATURATION: 100 % | WEIGHT: 293 LBS

## 2018-06-11 DIAGNOSIS — I80.02 THROMBOPHLEBITIS OF SUPERFICIAL VEINS OF LEFT LOWER EXTREMITY: Primary | ICD-10-CM

## 2018-06-11 PROCEDURE — 99284 EMERGENCY DEPT VISIT MOD MDM: CPT

## 2018-06-11 PROCEDURE — 76882 US LMTD JT/FCL EVL NVASC XTR: CPT | Performed by: EMERGENCY MEDICINE

## 2018-06-11 PROCEDURE — 96361 HYDRATE IV INFUSION ADD-ON: CPT

## 2018-06-11 PROCEDURE — 93971 EXTREMITY STUDY: CPT | Performed by: EMERGENCY MEDICINE

## 2018-06-11 PROCEDURE — 99285 EMERGENCY DEPT VISIT HI MDM: CPT

## 2018-06-11 PROCEDURE — 96360 HYDRATION IV INFUSION INIT: CPT

## 2018-06-11 PROCEDURE — 80053 COMPREHEN METABOLIC PANEL: CPT | Performed by: EMERGENCY MEDICINE

## 2018-06-11 PROCEDURE — 85025 COMPLETE CBC W/AUTO DIFF WBC: CPT | Performed by: EMERGENCY MEDICINE

## 2018-06-11 RX ORDER — TRAMADOL HYDROCHLORIDE 50 MG/1
100 TABLET ORAL 2 TIMES DAILY
COMMUNITY

## 2018-06-11 RX ORDER — SODIUM CHLORIDE 9 MG/ML
125 INJECTION, SOLUTION INTRAVENOUS CONTINUOUS
Status: DISCONTINUED | OUTPATIENT
Start: 2018-06-11 | End: 2018-06-12

## 2018-06-11 RX ORDER — TIZANIDINE 4 MG/1
4 TABLET ORAL NIGHTLY
COMMUNITY

## 2018-06-11 NOTE — ED PROVIDER NOTES
Patient Seen in: [facility] Emergency Department    History   Patient presents with:  Cellulitis (integumentary, infectious)    Stated Complaint: cellulits    HPI    Patient is a pleasant 66-year-old female, presenting for evaluation of redness and All other systems reviewed and negative except as noted above.     Physical Exam   ED Triage Vitals [06/11/18 1720]  BP: 150/88  Pulse: 76  Resp: 18  Temp: 98.1 °F (36.7 °C)  Temp src: Temporal  SpO2: 100 %  O2 Device: None (Room air)    Current:/65 Us Venous Doppler Leg Left - Diag Img (cpt=93971)    Result Date: 6/11/2018  PROCEDURE:  US VENOUS DOPPLER LEG LEFT - DIAG IMG (CPT=93971)  COMPARISON:  None.   INDICATIONS:  eval for DVT  TECHNIQUE:  Real time, grey scale, and duplex ultrasound was used to Patient was placed on a cart, an IV was established, and blood was drawn and sent to the laboratory for further evaluation. An infusion of normal saline was initiated. Patient was observed while the laboratory and radiology studies were obtained.     Result

## 2018-06-12 NOTE — ED NOTES
Pt updated on wait time - has no distress noted at this time and is on phone. Will continue to monitor.  Call light remains in reach

## 2018-11-29 ENCOUNTER — HOSPITAL ENCOUNTER (EMERGENCY)
Facility: HOSPITAL | Age: 43
Discharge: HOME | End: 2018-11-29
Attending: EMERGENCY MEDICINE
Payer: COMMERCIAL

## 2018-11-29 ENCOUNTER — APPOINTMENT (OUTPATIENT)
Dept: GENERAL RADIOLOGY | Facility: HOSPITAL | Age: 43
End: 2018-11-29
Attending: EMERGENCY MEDICINE
Payer: COMMERCIAL

## 2018-11-29 VITALS
TEMPERATURE: 99 F | HEIGHT: 68 IN | BODY MASS INDEX: 44.41 KG/M2 | DIASTOLIC BLOOD PRESSURE: 62 MMHG | RESPIRATION RATE: 18 BRPM | OXYGEN SATURATION: 100 % | HEART RATE: 77 BPM | SYSTOLIC BLOOD PRESSURE: 119 MMHG | WEIGHT: 293 LBS

## 2018-11-29 DIAGNOSIS — S62.654A CLOSED NONDISPLACED FRACTURE OF MIDDLE PHALANX OF RIGHT RING FINGER, INITIAL ENCOUNTER: Primary | ICD-10-CM

## 2018-11-29 PROCEDURE — 73140 X-RAY EXAM OF FINGER(S): CPT | Performed by: EMERGENCY MEDICINE

## 2018-11-29 PROCEDURE — 99283 EMERGENCY DEPT VISIT LOW MDM: CPT

## 2018-11-29 RX ORDER — IBUPROFEN 600 MG/1
600 TABLET ORAL EVERY 8 HOURS PRN
Qty: 30 TABLET | Refills: 0 | Status: SHIPPED | OUTPATIENT
Start: 2018-11-29 | End: 2018-12-09

## 2018-11-29 RX ORDER — ARIPIPRAZOLE 20 MG/1
10 TABLET ORAL DAILY
COMMUNITY

## 2018-11-30 NOTE — ED PROVIDER NOTES
Patient Seen in: [facility] Emergency Department    History   Patient presents with:  Upper Extremity Injury (musculoskeletal)    Stated Complaint: hand pain    HPI    44-year-old female presents emergency department complaining of right hand injury above.    Physical Exam     ED Triage Vitals [11/29/18 2113]   /72   Pulse 80   Resp 18   Temp 98.6 °F (37 °C)   Temp src Temporal   SpO2 100 %   O2 Device None (Room air)       Current:/62   Pulse 77   Temp 98.6 °F (37 °C) (Temporal)   Resp 18 discharged in good condition.               Disposition and Plan     Clinical Impression:  Closed nondisplaced fracture of middle phalanx of right ring finger, initial encounter  (primary encounter diagnosis)    Disposition:  Discharge  11/29/2018 10:16 pm

## 2018-11-30 NOTE — ED INITIAL ASSESSMENT (HPI)
36 y/o female to ED with c/o of right hand injury. Patient reports hand was smashed by car door, injury mainly to 4th digit, limited movement and is experiencing numbness to finger.

## 2019-01-17 NOTE — ED AVS SNAPSHOT
[facility] Emergency Department    [place]  [address] [address]    Phone:  [phone]    Fax:  [phone]           Jennie Matthewser   MRN: [id number]    Department:  [facility] Emergency Department   Date of Visit: Fabian MALIN Pappas presents to infusion area after provider visit for scheduled sandostatin injection (see MAR)    Pre-Injection Information: Vital signs entered., Allergies reviewed as required for injection type., Pt states feeling well, no complaints., Pt denies signs and symptoms of infection. and Authorization completed if applicable.    Refer to MAR (medication administration record) for type of injection and medication given.  Needle Size: needle included in package  Patient tolerated well: Stable     Dr. Gume Felix is supervising clinician today.    Future Appointments   Date Time Provider Department Center   1/31/2019 12:30 PM [facility] ONC WEST LAB [facility] ML   1/31/2019  1:00 PM [facility] TREATMENT CHAIR [facility] ML   2/14/2019 12:00 PM [facility] ONC WEST LAB [facility] ML   2/14/2019 12:30 PM Gume Felix MD [facility] ML   2/19/2019  1:15 PM Allen Luciano MD [facility]          [place])    [address] [facility] [address]   [phone]            Apr 17, 2017  8:00 AM   Mammogram with [address] ([address] at [address] [facility] [address] )    [address] [place] [address] 89. [address] [facility] ([facility])    [address]   [phone]              Pending Labs     Order Current Status    URINE CULTURE, ROUTINE In process         Medication Informatio Expect to receive an electronic request (by e-mail or text) to complete a self-assessment the day after your visit. You may also receive a call from our patient liason soon after your visit.  Also, some patients receive a detailed feedback survey mailed to [facility] [address]  ([address]) [address] [facility] [phone] [address] [facility] 30. ([address] [place] [address]) [phone] [address] [address] [address] ( Call [phone] for help. [employer] is NOT to be used for urgent needs. For medical emergencies, dial 911.

## 2019-10-21 ENCOUNTER — APPOINTMENT (OUTPATIENT)
Dept: GENERAL RADIOLOGY | Facility: HOSPITAL | Age: 44
End: 2019-10-21
Payer: COMMERCIAL

## 2019-10-21 ENCOUNTER — APPOINTMENT (OUTPATIENT)
Dept: CT IMAGING | Facility: HOSPITAL | Age: 44
End: 2019-10-21
Attending: EMERGENCY MEDICINE
Payer: COMMERCIAL

## 2019-10-21 ENCOUNTER — HOSPITAL ENCOUNTER (EMERGENCY)
Facility: HOSPITAL | Age: 44
Discharge: HOME | End: 2019-10-21
Attending: EMERGENCY MEDICINE
Payer: COMMERCIAL

## 2019-10-21 VITALS
HEART RATE: 68 BPM | SYSTOLIC BLOOD PRESSURE: 106 MMHG | RESPIRATION RATE: 17 BRPM | TEMPERATURE: 98 F | HEIGHT: 68 IN | DIASTOLIC BLOOD PRESSURE: 66 MMHG | BODY MASS INDEX: 44.41 KG/M2 | OXYGEN SATURATION: 97 % | WEIGHT: 293 LBS

## 2019-10-21 DIAGNOSIS — R07.9 CHEST PAIN OF UNCERTAIN ETIOLOGY: Primary | ICD-10-CM

## 2019-10-21 PROCEDURE — 99285 EMERGENCY DEPT VISIT HI MDM: CPT

## 2019-10-21 PROCEDURE — 71275 CT ANGIOGRAPHY CHEST: CPT | Performed by: EMERGENCY MEDICINE

## 2019-10-21 PROCEDURE — 84484 ASSAY OF TROPONIN QUANT: CPT | Performed by: EMERGENCY MEDICINE

## 2019-10-21 PROCEDURE — 85025 COMPLETE CBC W/AUTO DIFF WBC: CPT | Performed by: EMERGENCY MEDICINE

## 2019-10-21 PROCEDURE — 80053 COMPREHEN METABOLIC PANEL: CPT | Performed by: EMERGENCY MEDICINE

## 2019-10-21 PROCEDURE — 93010 ELECTROCARDIOGRAM REPORT: CPT

## 2019-10-21 PROCEDURE — 93005 ELECTROCARDIOGRAM TRACING: CPT

## 2019-10-21 PROCEDURE — 85379 FIBRIN DEGRADATION QUANT: CPT | Performed by: EMERGENCY MEDICINE

## 2019-10-21 PROCEDURE — 36415 COLL VENOUS BLD VENIPUNCTURE: CPT

## 2019-10-21 PROCEDURE — 71045 X-RAY EXAM CHEST 1 VIEW: CPT

## 2019-10-21 RX ORDER — ASPIRIN 81 MG/1
324 TABLET, CHEWABLE ORAL ONCE
Status: COMPLETED | OUTPATIENT
Start: 2019-10-21 | End: 2019-10-21

## 2019-10-21 NOTE — ED INITIAL ASSESSMENT (HPI)
Pt c/o left sided chest pain since Saturday that is worse with deep breathing - denies radiation of pain, n/v/, diaphoresis     Denies cardiac hx for self or family     Skin w/p/d in triage

## 2019-10-22 NOTE — ED PROVIDER NOTES
Patient Seen in: [facility] Emergency Department      History   Patient presents with:  Chest Pain Angina (cardiovascular)    Stated Complaint: chest pain     HPI    This is a 79-year-old female who presents with complaints of chest discomfort for 1 Smoker      Smokeless tobacco: Never Used    Alcohol use: No      Alcohol/week: 0.0 standard drinks    Drug use: No             Review of Systems    Positive for stated complaint: chest pain   Other systems are as noted in HPI.   Constitutional and vital si D-Dimer 1.03 (*)     All other components within normal limits    Narrative:      FEU = Fibrinogen Equivalent Units.     In non-pregnant females:  D-Dimer results of less than 0.5 ug/mL (FEU) have been shown to contribute to  the exclusion of venous thrombo ischemic findings. The rest of the EKG including rate rhythm axis and intervals I agree with the EKG report . The rate is 75 there is some nonspecific ST changes noted no acute ST elevations are noted. The EKG shows normal sinus rhythm.   There is n No lytic or destructive process      CONCLUSION:  No evidence of pulmonary embolism. 4 mm and 5 mm subpleural nodules in the right upper lobe and right middle lobe. Follow-up should be based on level of clinical concern.    Dictated by: Mana Gaines MD

## 2019-10-25 PROBLEM — R07.2 PRECORDIAL PAIN: Status: ACTIVE | Noted: 2019-10-25

## 2020-01-28 ENCOUNTER — HOSPITAL (OUTPATIENT)
Dept: OTHER | Age: 45
End: 2020-01-28

## 2020-09-08 PROBLEM — M75.101 ROTATOR CUFF SYNDROME, RIGHT: Status: ACTIVE | Noted: 2020-09-08

## 2020-10-08 ENCOUNTER — HOSPITAL ENCOUNTER (EMERGENCY)
Facility: HOSPITAL | Age: 45
Discharge: HOME | End: 2020-10-08
Attending: EMERGENCY MEDICINE
Payer: COMMERCIAL

## 2020-10-08 ENCOUNTER — APPOINTMENT (OUTPATIENT)
Dept: GENERAL RADIOLOGY | Facility: HOSPITAL | Age: 45
End: 2020-10-08
Attending: EMERGENCY MEDICINE
Payer: COMMERCIAL

## 2020-10-08 VITALS
HEIGHT: 68 IN | SYSTOLIC BLOOD PRESSURE: 168 MMHG | OXYGEN SATURATION: 98 % | RESPIRATION RATE: 18 BRPM | HEART RATE: 94 BPM | BODY MASS INDEX: 44.41 KG/M2 | DIASTOLIC BLOOD PRESSURE: 91 MMHG | WEIGHT: 293 LBS | TEMPERATURE: 98 F

## 2020-10-08 DIAGNOSIS — M75.101 ROTATOR CUFF SYNDROME, RIGHT: Primary | ICD-10-CM

## 2020-10-08 DIAGNOSIS — G89.4 CHRONIC PAIN SYNDROME: ICD-10-CM

## 2020-10-08 PROCEDURE — 99283 EMERGENCY DEPT VISIT LOW MDM: CPT

## 2020-10-08 PROCEDURE — 96372 THER/PROPH/DIAG INJ SC/IM: CPT

## 2020-10-08 PROCEDURE — 73030 X-RAY EXAM OF SHOULDER: CPT | Performed by: EMERGENCY MEDICINE

## 2020-10-08 RX ORDER — METHYLPREDNISOLONE 4 MG/1
TABLET ORAL
Qty: 1 PACKAGE | Refills: 0 | Status: SHIPPED | OUTPATIENT
Start: 2020-10-08 | End: 2020-11-11 | Stop reason: ALTCHOICE

## 2020-10-08 RX ORDER — KETOROLAC TROMETHAMINE 30 MG/ML
15 INJECTION, SOLUTION INTRAMUSCULAR; INTRAVENOUS ONCE
Status: COMPLETED | OUTPATIENT
Start: 2020-10-08 | End: 2020-10-08

## 2020-10-08 RX ORDER — DIAZEPAM 5 MG/1
5 TABLET ORAL ONCE
Status: COMPLETED | OUTPATIENT
Start: 2020-10-08 | End: 2020-10-08

## 2020-10-09 NOTE — ED PROVIDER NOTES
Patient Seen in: [facility] Emergency Department      History   Patient presents with:  Pain    Stated Complaint: right shoulder pain, worsening tonight, denies known injury    HPI    42-year-old female presents to the emerge department with complai PARA OVARIAN CYST   • REMOVAL OF OVARIAN CYST(S) Left 6/1/16   • TOTAL ABDOM HYSTERECTOMY                      Social History    Tobacco Use      Smoking status: Never Smoker      Smokeless tobacco: Never Used    Alcohol use: No      Alcohol/week: 0.0 jana warm and dry. Coloration: Skin is not pale. Neurological:      Mental Status: She is alert and oriented to person, place, and time. Cranial Nerves: No cranial nerve deficit.       Coordination: Coordination normal.      Deep Tendon Reflexes: Ref Medication List    START taking these medications    Diclofenac Sodium (VOLTAREN) 1 % Transdermal Gel  Apply 2 g topically 4 (four) times daily.   Qty: 1 Tube Refills: 0    methylPREDNISolone (MEDROL) 4 MG Oral Tablet Therapy Pack  Dosepack: take as directe

## 2020-10-09 NOTE — ED INITIAL ASSESSMENT (HPI)
A/O x 4. Patient presents with right shoulder pain. Patient reports that she has a history of shoulder issues x few months and is scheduled for an MRI of the right shoulder on Sunday, but could non longer tolerate the pain.   Patient reports that she took

## 2020-10-14 PROBLEM — M75.21 BICEPS TENDINITIS, RIGHT: Status: ACTIVE | Noted: 2020-10-14

## 2020-11-25 PROBLEM — S90.01XA CONTUSION OF RIGHT ANKLE: Status: RESOLVED | Noted: 2017-09-14 | Resolved: 2020-11-25

## 2020-11-25 PROBLEM — M79.10 MYALGIA: Status: RESOLVED | Noted: 2017-02-24 | Resolved: 2020-11-25

## 2020-11-25 PROBLEM — N92.0 MENORRHAGIA: Status: RESOLVED | Noted: 2017-05-06 | Resolved: 2020-11-25

## 2020-11-25 PROBLEM — M25.871 IMPINGEMENT SYNDROME OF RIGHT ANKLE: Status: RESOLVED | Noted: 2017-09-14 | Resolved: 2020-11-25

## 2020-11-25 PROBLEM — M75.21 BICEPS TENDINITIS, RIGHT: Status: RESOLVED | Noted: 2020-10-14 | Resolved: 2020-11-25

## 2020-11-25 PROBLEM — M25.571 ACUTE RIGHT ANKLE PAIN: Status: RESOLVED | Noted: 2017-06-20 | Resolved: 2020-11-25

## 2020-11-25 PROBLEM — M62.81 QUADRICEPS WEAKNESS: Status: RESOLVED | Noted: 2017-04-11 | Resolved: 2020-11-25

## 2020-11-25 PROBLEM — R07.2 PRECORDIAL PAIN: Status: RESOLVED | Noted: 2019-10-25 | Resolved: 2020-11-25

## 2021-02-02 ENCOUNTER — LAB ENCOUNTER (OUTPATIENT)
Dept: LAB | Age: 46
End: 2021-02-02
Attending: ORTHOPAEDIC SURGERY
Payer: COMMERCIAL

## 2021-02-02 DIAGNOSIS — M75.21 BICEPS TENDINITIS, RIGHT: ICD-10-CM

## 2021-02-03 LAB — SARS-COV-2 RNA RESP QL NAA+PROBE: NOT DETECTED

## 2021-02-04 ENCOUNTER — ANESTHESIA EVENT (OUTPATIENT)
Dept: SURGERY | Facility: HOSPITAL | Age: 46
End: 2021-02-04
Payer: COMMERCIAL

## 2021-02-05 ENCOUNTER — HOSPITAL ENCOUNTER (OUTPATIENT)
Facility: HOSPITAL | Age: 46
Setting detail: HOSPITAL OUTPATIENT SURGERY
Discharge: HOME | End: 2021-02-05
Attending: ORTHOPAEDIC SURGERY | Admitting: ORTHOPAEDIC SURGERY
Payer: COMMERCIAL

## 2021-02-05 ENCOUNTER — ANESTHESIA (OUTPATIENT)
Dept: SURGERY | Facility: HOSPITAL | Age: 46
End: 2021-02-05
Payer: COMMERCIAL

## 2021-02-05 VITALS
OXYGEN SATURATION: 99 % | HEART RATE: 73 BPM | SYSTOLIC BLOOD PRESSURE: 120 MMHG | DIASTOLIC BLOOD PRESSURE: 86 MMHG | TEMPERATURE: 97 F | BODY MASS INDEX: 44.41 KG/M2 | WEIGHT: 293 LBS | RESPIRATION RATE: 16 BRPM | HEIGHT: 68 IN

## 2021-02-05 DIAGNOSIS — M75.21 BICEPS TENDINITIS, RIGHT: Primary | ICD-10-CM

## 2021-02-05 DIAGNOSIS — M75.21 BICIPITAL TENDINITIS, RIGHT: ICD-10-CM

## 2021-02-05 DIAGNOSIS — M67.921 BICEPS TENDINOPATHY, RIGHT: ICD-10-CM

## 2021-02-05 PROCEDURE — 36410 VNPNXR 3YR/> PHY/QHP DX/THER: CPT | Performed by: ORTHOPAEDIC SURGERY

## 2021-02-05 PROCEDURE — 0LS34ZZ REPOSITION RIGHT UPPER ARM TENDON, PERCUTANEOUS ENDOSCOPIC APPROACH: ICD-10-PCS | Performed by: ORTHOPAEDIC SURGERY

## 2021-02-05 PROCEDURE — 76942 ECHO GUIDE FOR BIOPSY: CPT | Performed by: ANESTHESIOLOGY

## 2021-02-05 PROCEDURE — 76937 US GUIDE VASCULAR ACCESS: CPT | Performed by: ORTHOPAEDIC SURGERY

## 2021-02-05 PROCEDURE — 0RBJ4ZZ EXCISION OF RIGHT SHOULDER JOINT, PERCUTANEOUS ENDOSCOPIC APPROACH: ICD-10-PCS | Performed by: ORTHOPAEDIC SURGERY

## 2021-02-05 RX ORDER — HYDROCODONE BITARTRATE AND ACETAMINOPHEN 10; 325 MG/1; MG/1
1 TABLET ORAL EVERY 4 HOURS PRN
Qty: 30 TABLET | Refills: 0 | Status: SHIPPED | OUTPATIENT
Start: 2021-02-05 | End: 2021-02-10

## 2021-02-05 RX ORDER — DEXAMETHASONE SODIUM PHOSPHATE 4 MG/ML
VIAL (ML) INJECTION AS NEEDED
Status: DISCONTINUED | OUTPATIENT
Start: 2021-02-05 | End: 2021-02-05 | Stop reason: SURG

## 2021-02-05 RX ORDER — PHYTONADIONE (VIT K1) 100 MCG
100 TABLET ORAL DAILY
COMMUNITY
End: 2021-04-23 | Stop reason: ALTCHOICE

## 2021-02-05 RX ORDER — ACETAMINOPHEN 500 MG
1000 TABLET ORAL ONCE
Status: DISCONTINUED | OUTPATIENT
Start: 2021-02-05 | End: 2021-02-05

## 2021-02-05 RX ORDER — HYDROCODONE BITARTRATE AND ACETAMINOPHEN 5; 325 MG/1; MG/1
1 TABLET ORAL AS NEEDED
Status: DISCONTINUED | OUTPATIENT
Start: 2021-02-05 | End: 2021-02-05

## 2021-02-05 RX ORDER — ACETAMINOPHEN 500 MG
1000 TABLET ORAL ONCE
COMMUNITY
End: 2021-08-25

## 2021-02-05 RX ORDER — HYDROMORPHONE HYDROCHLORIDE 1 MG/ML
0.4 INJECTION, SOLUTION INTRAMUSCULAR; INTRAVENOUS; SUBCUTANEOUS EVERY 5 MIN PRN
Status: DISCONTINUED | OUTPATIENT
Start: 2021-02-05 | End: 2021-02-05

## 2021-02-05 RX ORDER — METOCLOPRAMIDE HYDROCHLORIDE 5 MG/ML
INJECTION INTRAMUSCULAR; INTRAVENOUS AS NEEDED
Status: DISCONTINUED | OUTPATIENT
Start: 2021-02-05 | End: 2021-02-05 | Stop reason: SURG

## 2021-02-05 RX ORDER — GLYCOPYRROLATE 0.2 MG/ML
INJECTION, SOLUTION INTRAMUSCULAR; INTRAVENOUS AS NEEDED
Status: DISCONTINUED | OUTPATIENT
Start: 2021-02-05 | End: 2021-02-05 | Stop reason: SURG

## 2021-02-05 RX ORDER — ONDANSETRON 2 MG/ML
INJECTION INTRAMUSCULAR; INTRAVENOUS AS NEEDED
Status: DISCONTINUED | OUTPATIENT
Start: 2021-02-05 | End: 2021-02-05 | Stop reason: SURG

## 2021-02-05 RX ORDER — SCOLOPAMINE TRANSDERMAL SYSTEM 1 MG/1
PATCH, EXTENDED RELEASE TRANSDERMAL
Status: DISCONTINUED
Start: 2021-02-05 | End: 2021-02-05

## 2021-02-05 RX ORDER — METOCLOPRAMIDE HYDROCHLORIDE 5 MG/ML
10 INJECTION INTRAMUSCULAR; INTRAVENOUS AS NEEDED
Status: DISCONTINUED | OUTPATIENT
Start: 2021-02-05 | End: 2021-02-05

## 2021-02-05 RX ORDER — SCOLOPAMINE TRANSDERMAL SYSTEM 1 MG/1
1 PATCH, EXTENDED RELEASE TRANSDERMAL ONCE
Status: DISCONTINUED | OUTPATIENT
Start: 2021-02-05 | End: 2021-02-05

## 2021-02-05 RX ORDER — SODIUM CHLORIDE, SODIUM LACTATE, POTASSIUM CHLORIDE, CALCIUM CHLORIDE 600; 310; 30; 20 MG/100ML; MG/100ML; MG/100ML; MG/100ML
INJECTION, SOLUTION INTRAVENOUS CONTINUOUS
Status: DISCONTINUED | OUTPATIENT
Start: 2021-02-05 | End: 2021-02-05

## 2021-02-05 RX ORDER — HYDROCODONE BITARTRATE AND ACETAMINOPHEN 5; 325 MG/1; MG/1
2 TABLET ORAL AS NEEDED
Status: DISCONTINUED | OUTPATIENT
Start: 2021-02-05 | End: 2021-02-05

## 2021-02-05 RX ORDER — LIDOCAINE HYDROCHLORIDE 10 MG/ML
INJECTION, SOLUTION EPIDURAL; INFILTRATION; INTRACAUDAL; PERINEURAL AS NEEDED
Status: DISCONTINUED | OUTPATIENT
Start: 2021-02-05 | End: 2021-02-05 | Stop reason: SURG

## 2021-02-05 RX ORDER — NALOXONE HYDROCHLORIDE 0.4 MG/ML
80 INJECTION, SOLUTION INTRAMUSCULAR; INTRAVENOUS; SUBCUTANEOUS AS NEEDED
Status: DISCONTINUED | OUTPATIENT
Start: 2021-02-05 | End: 2021-02-05

## 2021-02-05 RX ORDER — NEOSTIGMINE METHYLSULFATE 1 MG/ML
INJECTION INTRAVENOUS AS NEEDED
Status: DISCONTINUED | OUTPATIENT
Start: 2021-02-05 | End: 2021-02-05 | Stop reason: SURG

## 2021-02-05 RX ORDER — ONDANSETRON 2 MG/ML
4 INJECTION INTRAMUSCULAR; INTRAVENOUS AS NEEDED
Status: DISCONTINUED | OUTPATIENT
Start: 2021-02-05 | End: 2021-02-05

## 2021-02-05 RX ORDER — ROCURONIUM BROMIDE 10 MG/ML
INJECTION, SOLUTION INTRAVENOUS AS NEEDED
Status: DISCONTINUED | OUTPATIENT
Start: 2021-02-05 | End: 2021-02-05 | Stop reason: SURG

## 2021-02-05 RX ADMIN — DEXAMETHASONE SODIUM PHOSPHATE 4 MG: 4 MG/ML VIAL (ML) INJECTION at 13:43:00

## 2021-02-05 RX ADMIN — ONDANSETRON 4 MG: 2 INJECTION INTRAMUSCULAR; INTRAVENOUS at 14:07:00

## 2021-02-05 RX ADMIN — METOCLOPRAMIDE HYDROCHLORIDE 10 MG: 5 INJECTION INTRAMUSCULAR; INTRAVENOUS at 14:07:00

## 2021-02-05 RX ADMIN — ROCURONIUM BROMIDE 30 MG: 10 INJECTION, SOLUTION INTRAVENOUS at 13:31:00

## 2021-02-05 RX ADMIN — SODIUM CHLORIDE, SODIUM LACTATE, POTASSIUM CHLORIDE, CALCIUM CHLORIDE: 600; 310; 30; 20 INJECTION, SOLUTION INTRAVENOUS at 14:33:00

## 2021-02-05 RX ADMIN — LIDOCAINE HYDROCHLORIDE 30 MG: 10 INJECTION, SOLUTION EPIDURAL; INFILTRATION; INTRACAUDAL; PERINEURAL at 13:22:00

## 2021-02-05 RX ADMIN — NEOSTIGMINE METHYLSULFATE 5 MG: 1 INJECTION INTRAVENOUS at 14:19:00

## 2021-02-05 RX ADMIN — GLYCOPYRROLATE 0.8 MG: 0.2 INJECTION, SOLUTION INTRAMUSCULAR; INTRAVENOUS at 14:19:00

## 2021-02-05 NOTE — BRIEF OP NOTE
Pre-Operative Diagnosis: Biceps tendinopathy, right [M67.921]  Bicipital tendinitis, right [M75.21]     Post-Operative Diagnosis: Biceps tear, dense subacromial bursitis with fascial band     Procedure Performed:   Procedure(s):  RIGHT SHOULDER ARTHROSCOPY

## 2021-02-05 NOTE — ANESTHESIA PROCEDURE NOTES
Airway  Date/Time: 2/5/2021 1:24 PM  Urgency: elective    Airway not difficult    General Information and Staff    Patient location during procedure: OR  Anesthesiologist: Essence Cruz DO  Performed: anesthesiologist     Indications and Patient Condition  I

## 2021-02-05 NOTE — ANESTHESIA POSTPROCEDURE EVALUATION
[address]  Patient Status:  Hospital Outpatient Surgery   Age/Gender 39year old female MRN [id number]   [facility] SURGERY Attending Michael Resendiz MD   Hosp Day # 0 PCP Nikki Corona MD       Anesthesia Post-op

## 2021-02-05 NOTE — H&P
History & Physical Examination    Patient Name: Dong Greenwood  MRN: [id number]  CSN: [id number]  YOB: 1975    Diagnosis: Right shoulder biceps tendinitis pain, possible subscapularis tear    Present Illness:CHIEF COMPLAINT: Follow - Up       n&v   • ANXIETY     • Back problem     • Bipolar affective disorder (HCC)     • Depression     • Fibromyalgia     • IBS     • IBS (irritable bowel syndrome)     • OBESITY     • Obstructive sleep apnea (adult) (pediatric) 2011      AHI 11 autoPAP 6-16 rupture of right shoulder, not specified as  traumatic. Pain and limited range of motion. No known trauma. COMPARISON STUDY: X-rays from 9/8/2020. TECHNIQUE: Multiplanar MR imaging of the right shoulder was performed utilizing standard sequences.    Claudia Su possibly even a little upper border fraying or tearing. This was not mentioned by the radiologist.  She has a flat acromion but does have a little bit of a spurring at the [facility] joint which does have some arthritic changes but she is asymptomatic there.   The recognition. If you have any questions or clarifications please contact me. •  acetaminophen 500 MG Oral Tab, Take 1,000 mg by mouth one time. , Disp: , Rfl: , 2/5/2021 at 0830    •  Vitamin K, Phytonadione, 100 MCG Oral Tab, Take 100 mcg by mouth d n&v   • ANXIETY    • Back problem    • Bipolar affective disorder (HCC)    • Depression    • Fibromyalgia    • High blood pressure    • IBS    • IBS (irritable bowel syndrome)    • OBESITY    • Obstructive sleep apnea (adult) (pediatric) 2011     AHI 1 :  Right shoulder biceps tendon pain    Plan :  Right shoulder scope with biceps tenotomy, possible subscapularis debridement vs repair  [ x ] I have discussed the risks and benefits and alternatives with the patient/family.   They understand and agree to p

## 2021-02-05 NOTE — ANESTHESIA PROCEDURE NOTES
[facility]  Performed by: Ad Fleming DO  Authorized by: Ad Fleming DO       General Information and Staff    Start Time:  2/5/2021 1:12 PM  End Time:  2/5/2021 1:17 PM  Anesthesiologist:  Ad Fleming DO  Performed by:   Anesthesiologist  Patient Locat

## 2021-02-05 NOTE — ANESTHESIA PREPROCEDURE EVALUATION
PRE-OP EVALUATION    Patient Name: Luisana Dallas    Pre-op Diagnosis: Biceps tendinopathy, right [M67.921]  Bicipital tendinitis, right [M75.21]    Procedure(s):  RIGHT SHOULDER ARTHROSCOPY BICEP TENOTOMY, POSSIBLE SUBSCAPULARIS REPAIR    Surgeon(s) use: No      Alcohol/week: 0.0 standard drinks      Drug use: No     Available pre-op labs reviewed.   Lab Results   Component Value Date    WBC 9.53 11/11/2020    RBC 4.84 11/11/2020    HGB 13.3 11/11/2020    HCT 43.4 11/11/2020    MCV 89.7 11/11/2020    M

## 2021-02-08 NOTE — OPERATIVE REPORT
[facility]    PATIENT'S NAME: Yareli Sanchez   ATTENDING PHYSICIAN: Mira Tan M.D. OPERATING PHYSICIAN: Mira Tan M.D.    PATIENT ACCOUNT#:   [de-identified]    LOCATION:  [address] [place] [address] 3 [address] 10  MEDICAL RECORD #:   [id number] secured in a neutral position. SCDs had been placed prior to anesthesia. The right chest, shoulder, and upper extremity were then prepped and draped in the usual sterile fashion.     Time-out was performed confirming that the patient was the correct patie posteriorly rubbing over the rotator cuff. I released this fascial band with the shaver. Upon completion, there was no bursal-sided cuff tear noted. There were no further points of rubbing on the rotator cuff from the band.   She had a very ample subacro

## 2021-02-09 PROBLEM — M75.51 BURSITIS OF SHOULDER, RIGHT: Status: ACTIVE | Noted: 2021-02-09

## 2021-02-09 PROBLEM — S46.111D LABRAL TEAR OF LONG HEAD OF BICEPS TENDON, RIGHT, SUBSEQUENT ENCOUNTER: Status: ACTIVE | Noted: 2021-02-09

## 2021-02-19 PROBLEM — M25.511 ACUTE POSTOPERATIVE PAIN OF RIGHT SHOULDER: Status: ACTIVE | Noted: 2021-02-19

## 2021-02-19 PROBLEM — G89.18 ACUTE POSTOPERATIVE PAIN OF RIGHT SHOULDER: Status: ACTIVE | Noted: 2021-02-19

## 2021-04-23 ENCOUNTER — LAB ENCOUNTER (OUTPATIENT)
Dept: LAB | Facility: HOSPITAL | Age: 46
End: 2021-04-23
Attending: INTERNAL MEDICINE
Payer: COMMERCIAL

## 2021-04-23 ENCOUNTER — HOSPITAL ENCOUNTER (OUTPATIENT)
Dept: GENERAL RADIOLOGY | Facility: HOSPITAL | Age: 46
Discharge: HOME | End: 2021-04-23
Attending: INTERNAL MEDICINE
Payer: COMMERCIAL

## 2021-04-23 ENCOUNTER — OFFICE VISIT (OUTPATIENT)
Dept: RHEUMATOLOGY | Facility: CLINIC | Age: 46
End: 2021-04-23
Payer: COMMERCIAL

## 2021-04-23 VITALS
HEART RATE: 76 BPM | SYSTOLIC BLOOD PRESSURE: 131 MMHG | DIASTOLIC BLOOD PRESSURE: 83 MMHG | HEIGHT: 68 IN | RESPIRATION RATE: 16 BRPM | WEIGHT: 293 LBS | BODY MASS INDEX: 44.41 KG/M2

## 2021-04-23 DIAGNOSIS — M25.50 POLYARTHRALGIA: ICD-10-CM

## 2021-04-23 DIAGNOSIS — R53.83 FATIGUE, UNSPECIFIED TYPE: ICD-10-CM

## 2021-04-23 DIAGNOSIS — E55.9 VITAMIN D DEFICIENCY: ICD-10-CM

## 2021-04-23 DIAGNOSIS — M25.50 POLYARTHRALGIA: Primary | ICD-10-CM

## 2021-04-23 DIAGNOSIS — M79.7 FIBROMYALGIA: ICD-10-CM

## 2021-04-23 PROCEDURE — 85027 COMPLETE CBC AUTOMATED: CPT

## 2021-04-23 PROCEDURE — 3079F DIAST BP 80-89 MM HG: CPT | Performed by: INTERNAL MEDICINE

## 2021-04-23 PROCEDURE — 73560 X-RAY EXAM OF KNEE 1 OR 2: CPT | Performed by: INTERNAL MEDICINE

## 2021-04-23 PROCEDURE — 36415 COLL VENOUS BLD VENIPUNCTURE: CPT

## 2021-04-23 PROCEDURE — 80053 COMPREHEN METABOLIC PANEL: CPT

## 2021-04-23 PROCEDURE — 3075F SYST BP GE 130 - 139MM HG: CPT | Performed by: INTERNAL MEDICINE

## 2021-04-23 PROCEDURE — 3008F BODY MASS INDEX DOCD: CPT | Performed by: INTERNAL MEDICINE

## 2021-04-23 PROCEDURE — 86038 ANTINUCLEAR ANTIBODIES: CPT

## 2021-04-23 PROCEDURE — 86431 RHEUMATOID FACTOR QUANT: CPT

## 2021-04-23 PROCEDURE — 85652 RBC SED RATE AUTOMATED: CPT

## 2021-04-23 PROCEDURE — 82306 VITAMIN D 25 HYDROXY: CPT

## 2021-04-23 PROCEDURE — 82550 ASSAY OF CK (CPK): CPT

## 2021-04-23 PROCEDURE — 84443 ASSAY THYROID STIM HORMONE: CPT

## 2021-04-23 PROCEDURE — 86140 C-REACTIVE PROTEIN: CPT

## 2021-04-23 PROCEDURE — 99244 OFF/OP CNSLTJ NEW/EST MOD 40: CPT | Performed by: INTERNAL MEDICINE

## 2021-04-23 PROCEDURE — 82085 ASSAY OF ALDOLASE: CPT

## 2021-04-23 PROCEDURE — 86200 CCP ANTIBODY: CPT

## 2021-04-23 RX ORDER — TOPIRAMATE 100 MG/1
100 TABLET, FILM COATED ORAL 2 TIMES DAILY
COMMUNITY
Start: 2021-02-25 | End: 2021-08-25

## 2021-04-23 RX ORDER — TRAZODONE HYDROCHLORIDE 50 MG/1
TABLET ORAL
COMMUNITY
Start: 2021-02-25

## 2021-04-23 RX ORDER — UBROGEPANT 100 MG/1
TABLET ORAL
COMMUNITY
Start: 2020-12-17

## 2021-04-23 RX ORDER — GABAPENTIN 800 MG/1
800 TABLET ORAL 3 TIMES DAILY
Qty: 90 TABLET | Refills: 0 | Status: SHIPPED | OUTPATIENT
Start: 2021-04-23 | End: 2021-07-15

## 2021-04-23 RX ORDER — DOXYCYCLINE HYCLATE 100 MG/1
CAPSULE ORAL
COMMUNITY
Start: 2021-04-14 | End: 2021-08-25

## 2021-04-23 NOTE — PATIENT INSTRUCTIONS
1. Check labs   2. Check bilateral knee xrays   3. Increase gabapentin to 800mg three times a day   4. Return to clinic in 2 weeks.

## 2021-04-23 NOTE — PROGRESS NOTES
Jonathan Mary is a 39year old female who presents for Patient presents with:  Consult: Fibromyalgia  Nerves  Shoulder Pain  Leg Pain  . HPI:     I had the pleasure of seeing Jonathan Mary on 4/23/2021 for evaluation.      She is a pleasant Take 1 capsule (50,000 Units total) by mouth once a week. 12 capsule 0   • topiramate 50 MG Oral Tab Take 100 mg by mouth 2 (two) times daily. • ARIPiprazole 20 MG Oral Tab Take 10 mg by mouth daily.        • gabapentin 400 MG Oral Cap Take 600 mg by (rheumatoid arthritis) Maternal Grandmother    • Pacemaker Maternal Grandfather    • Cancer Paternal Grandmother         stage IV, unclear primary    • Cancer Father         lung cancer, smoker   • Prostate Cancer Maternal Grandfather    grandmother had ar Non tender, no HSM felt, BS positive  Joint exam:   Tender in neck - paracervical raea   Mild tender in shoulders   Tender in elbows   Mild tender in left 4th pip, and other pip   Tender in right 1-5th pips- of hands - mild fulllness of right 3rd mcp   Not Yes No   Sodium      136 - 145 mmol/L 139 145   Potassium      3.50 - 5.10 mmol/L 3.74 3.87   Chloride      98 - 107 mmol/L 105 110 (H)   Carbon Dioxide, Total      22.0 - 29.0 mmol/L 22.1 20.8 (L)   BUN      6.0 - 20.0 mg/dL 14.0 11.0   CREATININE      0.

## 2021-05-13 ENCOUNTER — OFFICE VISIT (OUTPATIENT)
Dept: RHEUMATOLOGY | Facility: CLINIC | Age: 46
End: 2021-05-13
Payer: COMMERCIAL

## 2021-05-13 VITALS
DIASTOLIC BLOOD PRESSURE: 78 MMHG | SYSTOLIC BLOOD PRESSURE: 122 MMHG | HEIGHT: 68 IN | WEIGHT: 293 LBS | HEART RATE: 89 BPM | BODY MASS INDEX: 44.41 KG/M2

## 2021-05-13 DIAGNOSIS — M79.7 FIBROMYALGIA: Primary | ICD-10-CM

## 2021-05-13 DIAGNOSIS — M19.90 OSTEOARTHRITIS, UNSPECIFIED OSTEOARTHRITIS TYPE, UNSPECIFIED SITE: ICD-10-CM

## 2021-05-13 PROCEDURE — 3078F DIAST BP <80 MM HG: CPT | Performed by: INTERNAL MEDICINE

## 2021-05-13 PROCEDURE — 3008F BODY MASS INDEX DOCD: CPT | Performed by: INTERNAL MEDICINE

## 2021-05-13 PROCEDURE — 3074F SYST BP LT 130 MM HG: CPT | Performed by: INTERNAL MEDICINE

## 2021-05-13 PROCEDURE — 99214 OFFICE O/P EST MOD 30 MIN: CPT | Performed by: INTERNAL MEDICINE

## 2021-05-13 RX ORDER — NALTREXONE 100 %
POWDER (GRAM) MISCELLANEOUS
Qty: 0.135 G | Refills: 1 | Status: SHIPPED | OUTPATIENT
Start: 2021-05-13

## 2021-05-13 NOTE — PATIENT INSTRUCTIONS
1. Can you ask - - if Cymbalta or Ernesto Mast are options to take with anti depressant medications   Also ask if switching gabapentin to lyrica is an option  2. decrease gabapentin back to  600mg three times a day   3. .try low dose naltrexone 4.5mg a day - osbaldo important that you keep all appointments for medical exams and tests while on this medicine. Cautions  Tell your doctor and pharmacist if you ever had an allergic reaction to a medicine.  Symptoms of an allergic reaction can include trouble breathing, skin or wear a medical alert bracelet indicating your medical condition. Please tell all your doctors and dentists that you are on this medicine before they provide care.   Do not start or stop any other medicines without first speaking to your doctor or pharma questions. Always follow their advice. There is a more complete description of this medicine available in [place]. Scan this code on your smartphone or tablet or use the web address below. You can also ask your pharmacist for a printout.  If you have any ques

## 2021-05-13 NOTE — PROGRESS NOTES
Kolton Hou is a 39year old female who presents for Patient presents with: Follow - Up: gabapentin is not working  Pain: 6/10 generalized  . HPI:     I had the pleasure of seeing Kolton Hou on 4/23/2021 for evaluation.      She is a p index is 55.95 kg/m². Current Outpatient Medications   Medication Sig Dispense Refill   • UBRELVY 100 MG Oral Tab TAKE 1 TABLET BY MOUTH AT MIGRAINE ONSET.  MAY REPEAT IN 2 HOURS AS NEEDED     • topiramate 100 MG Oral Tab Take 100 mg by mouth 2 (two) t 03/2021   • FOREARM/WRIST SURGERY UNLISTED Right 2012     two procerdures - tendon repair & fusion of wrist   • LAP ADJUSTABLE GASTRIC BAND  5/2/12    GASTRIC SLEEVE   • LAPAROSCOPY,VAG HYSTERECTOMY  05/05/2017   • LEG/ANKLE SURGERY PROC UNLISTED Left 1999 miscarriages, no DVT Hx, no hx of OCP,   Neuro: slight carpal tunnel,  numbness or tingling in hands,  no headache, no hx of seizures,   Psych:  hx of anxiety or depression, has bipolar 2-   ENDO: no hx of thyroid disease, no hx of DM  Joint/Muscluskeltal: Spec Gravity      1.005 - 1.030  1.009   PH Urine      5.0 - 7.5  7.5   Color Urine      Yellow  Yellow   APPEARANCE      Clear  Clear   WBC Urine      Negative  Negative   Protein      Negative/Trace  Negative   Glucose Urine      Negative  Negative   K A/G Ratio      1.0 - 2.0 0.8 (L)   Patient Fasting?        No   WBC      4.0 - 11.0 x10(3) uL 9.3   RBC      3.80 - 5.30 x10(6)uL 4.79   Hemoglobin      12.0 - 16.0 g/dL 13.1   Hematocrit      35.0 - 48.0 % 42.2   MCV      80.0 - 100.0 fL 88.1   MCH lyrica if ok with psyhicatrits. Heating pad helps. Heated pool -     2. Bipolar - cont. meds   - on apiprazole   -     3. Mild osteoarthritis in knees - but minimal -     Summary:  1.  Can you ask - - if Cymbalta or Savella are options to take with ant

## 2021-06-10 ENCOUNTER — OFFICE VISIT (OUTPATIENT)
Dept: RHEUMATOLOGY | Facility: CLINIC | Age: 46
End: 2021-06-10
Payer: COMMERCIAL

## 2021-06-10 VITALS
RESPIRATION RATE: 16 BRPM | WEIGHT: 293 LBS | BODY MASS INDEX: 44.41 KG/M2 | DIASTOLIC BLOOD PRESSURE: 82 MMHG | HEART RATE: 90 BPM | SYSTOLIC BLOOD PRESSURE: 134 MMHG | HEIGHT: 68 IN

## 2021-06-10 DIAGNOSIS — M19.90 OSTEOARTHRITIS, UNSPECIFIED OSTEOARTHRITIS TYPE, UNSPECIFIED SITE: ICD-10-CM

## 2021-06-10 DIAGNOSIS — M79.7 FIBROMYALGIA: Primary | ICD-10-CM

## 2021-06-10 PROCEDURE — 3079F DIAST BP 80-89 MM HG: CPT | Performed by: INTERNAL MEDICINE

## 2021-06-10 PROCEDURE — 3008F BODY MASS INDEX DOCD: CPT | Performed by: INTERNAL MEDICINE

## 2021-06-10 PROCEDURE — 3075F SYST BP GE 130 - 139MM HG: CPT | Performed by: INTERNAL MEDICINE

## 2021-06-10 PROCEDURE — 99214 OFFICE O/P EST MOD 30 MIN: CPT | Performed by: INTERNAL MEDICINE

## 2021-06-10 RX ORDER — DULOXETIN HYDROCHLORIDE 30 MG/1
30 CAPSULE, DELAYED RELEASE ORAL DAILY
Qty: 30 CAPSULE | Refills: 1 | Status: SHIPPED | OUTPATIENT
Start: 2021-06-10 | End: 2021-07-15

## 2021-06-10 NOTE — PROGRESS NOTES
Leopoldo Kid is a 39year old female who presents for Patient presents with:  Fibromyalgia  Medication Follow-Up  Neck Pain  Shoulder Pain  . HPI:     I had the pleasure of seeing Leopoldo Kid on 4/23/2021 for evaluation.      She is a ple pain.   Her psychiatrist is ok with switching off the gabapentin to lyrica but her insurance doesn'tt cover it completely   She was also told that cymbalta and savella.        Wt Readings from Last 2 Encounters:  06/10/21 : (!) 358 lb (162.4 kg)  05/14/21 : (postoperative nausea and vomiting)    • SEASONAL ALLERGIES    • Sleep apnea    • Visual impairment       Past Surgical History:   Procedure Laterality Date   • EACH ADD TOOTH EXTRACTION  03/2021   • FOREARM/WRIST SURGERY UNLISTED Right 2012     two procer SOB, no Cough, No Pleurtic pain,   GI: No nausea, no vomiiting, no abominal pain, no hx of ulcer, no gastritis, gets heartburn, no dyshpagia, no BRBPR or melena  : no dysuria, no hx of miscarriages, no DVT Hx, no hx of OCP,   Neuro: slight carpal tunnel, 0.000 - 0.012 10ˆ3/µL  0.000   Neutrophils %      %  65.5   Lymphocytes %      %  24.0   Monocytes %      %  6.2   Eosinophils %      %  3.5   Basophils %      %  0.8   nRBC/100 WBC      %  0.00   Spec Gravity      1.005 - 1.030  1.009   PH Urine      5.0 ALKALINE PHOSPHATASE      37 - 98 U/L 114 (H)   Total Bilirubin      0.1 - 2.0 mg/dL 0.3   TOTAL PROTEIN      6.4 - 8.2 g/dL 7.8   Albumin      3.4 - 5.0 g/dL 3.4   Globulin      2.8 - 4.4 g/dL 4.4   A/G Ratio      1.0 - 2.0 0.8 (L)   Patient Fasting? should be ok. - cont. Tramadol 50mg three times a day - helps a little a bit - not helping her knee pain.  , she takes it twice a day and one extra is prn  - tizandine 4mg at night   -  LDN 4.5mga day x 1 month - is helping he r- but she will like to go o

## 2021-06-16 ENCOUNTER — TELEPHONE (OUTPATIENT)
Dept: RHEUMATOLOGY | Facility: CLINIC | Age: 46
End: 2021-06-16

## 2021-06-16 NOTE — TELEPHONE ENCOUNTER
Pt. Is ok to try the duloxetine at a low dose   - she is on tramadol 50mg bid - not 100mg bid per our last viist.

## 2021-06-16 NOTE — TELEPHONE ENCOUNTER
Pharmacy calling with a drug interaction between medications below. Pharmacist stated they increase the risk of serotonin syndrome. TraMADol HCl 50 MG Oral Tab       Sig:   Take 100 mg by mouth 2 (two) times daily.      Route:   Oral     Class:   Histori

## 2021-07-15 ENCOUNTER — OFFICE VISIT (OUTPATIENT)
Dept: RHEUMATOLOGY | Facility: CLINIC | Age: 46
End: 2021-07-15
Payer: COMMERCIAL

## 2021-07-15 VITALS
SYSTOLIC BLOOD PRESSURE: 136 MMHG | HEART RATE: 91 BPM | BODY MASS INDEX: 44.41 KG/M2 | DIASTOLIC BLOOD PRESSURE: 78 MMHG | HEIGHT: 68 IN | WEIGHT: 293 LBS

## 2021-07-15 DIAGNOSIS — M19.90 OSTEOARTHRITIS, UNSPECIFIED OSTEOARTHRITIS TYPE, UNSPECIFIED SITE: ICD-10-CM

## 2021-07-15 DIAGNOSIS — M79.7 FIBROMYALGIA: Primary | ICD-10-CM

## 2021-07-15 PROCEDURE — 3075F SYST BP GE 130 - 139MM HG: CPT | Performed by: INTERNAL MEDICINE

## 2021-07-15 PROCEDURE — 3008F BODY MASS INDEX DOCD: CPT | Performed by: INTERNAL MEDICINE

## 2021-07-15 PROCEDURE — 99214 OFFICE O/P EST MOD 30 MIN: CPT | Performed by: INTERNAL MEDICINE

## 2021-07-15 PROCEDURE — 3078F DIAST BP <80 MM HG: CPT | Performed by: INTERNAL MEDICINE

## 2021-07-15 RX ORDER — GABAPENTIN 600 MG/1
600 TABLET ORAL 3 TIMES DAILY
Qty: 90 TABLET | Refills: 0 | COMMUNITY
Start: 2021-07-15

## 2021-07-15 RX ORDER — DULOXETIN HYDROCHLORIDE 30 MG/1
30 CAPSULE, DELAYED RELEASE ORAL DAILY
Qty: 90 CAPSULE | Refills: 1 | Status: SHIPPED | OUTPATIENT
Start: 2021-07-15 | End: 2021-12-31

## 2021-07-15 NOTE — PATIENT INSTRUCTIONS
1. Cont.  duloxetine 30mg a day -   2. Cont. gabapentin back to  600mg three times a day   3. .ok to stop . low dose naltrexone 4.5mg a day - take in the afternoon   4. Tramadol 50mg twice ad ay - monitor while taking duloxetine   5.  Return to clinic in 6 m

## 2021-07-15 NOTE — PROGRESS NOTES
Isaak Mar is a 39year old female who presents for Patient presents with: Follow - Up  Fibromyalgia Syndrome  Pain: Shoulder  . HPI:     I had the pleasure of seeing Isaak Mar on 4/23/2021 for evaluation.      She is a pleasant 39 y psychiatrist is ok with switching off the gabapentin to lyrica but her insurance doesn'tt cover it completely   She was also told that cymbalta and savella. 7/15/2021  She has about 3/10 pain. She is feelign like the dulxoetine is working for her. History:   Diagnosis Date   • ACNE    • Anesthesia complication     n&v   • ANXIETY    • Back problem    • Bipolar affective disorder ([facility] [address]    • Depression    • Fibromyalgia    • High blood pressure    • IBS    • IBS (irritable bowel syndrome)    • OBESITY in weight or appetitie  Derm: No rashes, no oral ulcers, no alopecia,  photosensitivity, no psoriasis  HEENT:  dry eyes,  dry mouth, no Raynaud's,hands can crack abd in the cold,  no nasal ulcers, no parotid swelling,  neck pain, no jaw pain, no temple dianna Platelet Count      207 - 450 10:3/uL  253   RDW      11.5 - 16.0 %  13.4   MEAN PLATELET VOLUME      7.0 - 11.5 fL  10.4   Neutrophils Absolute      1.30 - 6.70 10ˆ3/µL  6.24   Lymphocytes Absolute      0.90 - 4.00 10ˆ3/µL  2.29   Monocytes Absolute Total      21.0 - 32.0 mmol/L 21.0   ANION GAP      0 - 18 mmol/L 7   BUN      7 - 18 mg/dL 13   CREATININE      0.55 - 1.02 mg/dL 0.66   BUN/CREAT Ratio      10.0 - 20.0 19.7   CALCIUM      8.5 - 10.1 mg/dL 8.4 (L)   CALCULATED OSMOLALITY      275 - 295 m inflammatory arthritis and/or connective tissue disease   In the past 2016 work up was [place] e-   But pain is increasing jose g in neck and knees  Add duloxetine 30mg a day   Stay on gabapenton 600mg tid - (  gabapentin to 800mg three times a day did not he

## 2021-08-06 PROBLEM — M25.511 PAIN IN RIGHT ACROMIOCLAVICULAR JOINT: Status: ACTIVE | Noted: 2021-08-06

## 2021-08-06 PROBLEM — S46.811A STRAIN OF RIGHT TRAPEZIUS MUSCLE: Status: ACTIVE | Noted: 2021-08-06

## 2021-08-11 PROBLEM — M25.511 CHRONIC RIGHT SHOULDER PAIN: Status: ACTIVE | Noted: 2021-08-11

## 2021-08-11 PROBLEM — G89.29 CHRONIC RIGHT SHOULDER PAIN: Status: ACTIVE | Noted: 2021-08-11

## 2021-08-18 ENCOUNTER — TELEPHONE (OUTPATIENT)
Dept: ORTHOPEDICS CLINIC | Facility: CLINIC | Age: 46
End: 2021-08-18

## 2021-08-18 DIAGNOSIS — M25.511 RIGHT SHOULDER PAIN, UNSPECIFIED CHRONICITY: Primary | ICD-10-CM

## 2021-08-18 NOTE — TELEPHONE ENCOUNTER
Patient is coming in for right shoulder pain- last images done 10/2020. Please advise if additional views are needed.     Future Appointments   Date Time Provider Easton Greene   8/18/2021  2:00 PM HEATHER Villarreal PT   8/20/2021 11:4

## 2021-08-18 NOTE — TELEPHONE ENCOUNTER
Requisite for xray order  Reviewed patients chart, xray orders are required. Order placed for right shoulder xrays    • Spoke to patient informed to arrive 30 mins prior to patients appt to complete x-ray order.  Patient verbalized understanding and agreeme

## 2021-08-20 ENCOUNTER — OFFICE VISIT (OUTPATIENT)
Dept: ORTHOPEDICS CLINIC | Facility: CLINIC | Age: 46
End: 2021-08-20
Payer: COMMERCIAL

## 2021-08-20 ENCOUNTER — HOSPITAL ENCOUNTER (OUTPATIENT)
Dept: GENERAL RADIOLOGY | Age: 46
Discharge: HOME | End: 2021-08-20
Attending: ORTHOPAEDIC SURGERY
Payer: COMMERCIAL

## 2021-08-20 DIAGNOSIS — M25.511 RIGHT SHOULDER PAIN, UNSPECIFIED CHRONICITY: ICD-10-CM

## 2021-08-20 DIAGNOSIS — M25.311 SHOULDER INSTABILITY, RIGHT: Primary | ICD-10-CM

## 2021-08-20 PROCEDURE — 73030 X-RAY EXAM OF SHOULDER: CPT | Performed by: ORTHOPAEDIC SURGERY

## 2021-08-20 PROCEDURE — 99204 OFFICE O/P NEW MOD 45 MIN: CPT | Performed by: ORTHOPAEDIC SURGERY

## 2021-08-20 NOTE — H&P
[facility] - ORTHOPEDICS  [facility] [id number]  [phone]     NEW PATIENT VISIT - HISTORY AND PHYSICAL EXAMINATION     Name: Samira Rice   MRN: [id number]  Date: 8/20/2021     CC: Right shou Right 02/05/2021    Dr Ori Bobby   • TOTAL ABDOM HYSTERECTOMY       FAMILY HX:  Family History   Problem Relation Age of Onset   • Heart Disease Maternal Grandmother         stent placement   • Cancer Maternal Grandmother         lung cancer   • Other (rheum ROS: A comprehensive 14 point review of systems was performed and was negative aside from the aforementioned per history of present illness.     SOCIAL HX:  Social History    Tobacco Use      Smoking status: Never Smoker      Smokeless tobacco: Never Used Sign:   Negative     Subtle posterior load-and-shift instability.   Positive thumb test.    Neurovascular Upper Extremity (Bilateral)  Motor:    5/5 EPL, Finger Abduction, , Pinch, Deltoid  Sensation:   intact to light touch median, ulnar, radial and ax

## 2021-08-23 ENCOUNTER — TELEPHONE (OUTPATIENT)
Dept: ORTHOPEDICS CLINIC | Facility: CLINIC | Age: 46
End: 2021-08-23

## 2021-08-23 NOTE — TELEPHONE ENCOUNTER
8/20/21 PLAN: Right shoulder  We had a detailed discussion outlining the etiology, anatomy, pathophysiology, and natural history of shoulder labral pathology and posterior instability.  Imaging was reviewed in detail and correlated to a 3-dimensional model

## 2021-08-23 NOTE — TELEPHONE ENCOUNTER
Patient called wanting to know if she should still continue to pursue physical therapy due to her RT shoulder instability ,Please advise.

## 2021-08-23 NOTE — TELEPHONE ENCOUNTER
Spoke to patient and informed of doctors recommendations below, patient verbalized understanding with intent to comply. No further questions or concerns at this time.

## 2021-09-15 RX ORDER — OXYCODONE AND ACETAMINOPHEN TABLETS 10; 300 MG/1; MG/1
1 TABLET ORAL EVERY 8 HOURS PRN
COMMUNITY

## 2021-09-15 RX ORDER — ACETAMINOPHEN 500 MG
1000 TABLET ORAL ONCE
Status: CANCELLED | OUTPATIENT
Start: 2021-09-15 | End: 2021-09-15

## 2021-09-17 ENCOUNTER — LAB ENCOUNTER (OUTPATIENT)
Dept: LAB | Age: 46
End: 2021-09-17
Attending: ORTHOPAEDIC SURGERY
Payer: COMMERCIAL

## 2021-09-17 DIAGNOSIS — M25.311 SHOULDER INSTABILITY, RIGHT: ICD-10-CM

## 2021-09-18 LAB — SARS-COV-2 RNA RESP QL NAA+PROBE: NOT DETECTED

## 2021-09-20 ENCOUNTER — ANESTHESIA EVENT (OUTPATIENT)
Dept: SURGERY | Facility: HOSPITAL | Age: 46
End: 2021-09-20
Payer: COMMERCIAL

## 2021-09-20 ENCOUNTER — ANESTHESIA (OUTPATIENT)
Dept: SURGERY | Facility: HOSPITAL | Age: 46
End: 2021-09-20
Payer: COMMERCIAL

## 2021-09-20 ENCOUNTER — HOSPITAL ENCOUNTER (OUTPATIENT)
Facility: HOSPITAL | Age: 46
Setting detail: HOSPITAL OUTPATIENT SURGERY
Discharge: HOME | End: 2021-09-20
Attending: ORTHOPAEDIC SURGERY | Admitting: ORTHOPAEDIC SURGERY
Payer: COMMERCIAL

## 2021-09-20 VITALS
OXYGEN SATURATION: 98 % | HEART RATE: 74 BPM | BODY MASS INDEX: 44.41 KG/M2 | RESPIRATION RATE: 16 BRPM | WEIGHT: 293 LBS | HEIGHT: 68 IN | TEMPERATURE: 98 F | SYSTOLIC BLOOD PRESSURE: 117 MMHG | DIASTOLIC BLOOD PRESSURE: 65 MMHG

## 2021-09-20 DIAGNOSIS — M25.311 SHOULDER INSTABILITY, RIGHT: Primary | ICD-10-CM

## 2021-09-20 PROCEDURE — 0MM14ZZ REATTACHMENT OF RIGHT SHOULDER BURSA AND LIGAMENT, PERCUTANEOUS ENDOSCOPIC APPROACH: ICD-10-PCS | Performed by: ORTHOPAEDIC SURGERY

## 2021-09-20 PROCEDURE — 0RBJ4ZZ EXCISION OF RIGHT SHOULDER JOINT, PERCUTANEOUS ENDOSCOPIC APPROACH: ICD-10-PCS | Performed by: ORTHOPAEDIC SURGERY

## 2021-09-20 PROCEDURE — 76942 ECHO GUIDE FOR BIOPSY: CPT | Performed by: ANESTHESIOLOGY

## 2021-09-20 PROCEDURE — 29806 SHO ARTHRS SRG CAPSULORRAPHY: CPT | Performed by: ORTHOPAEDIC SURGERY

## 2021-09-20 PROCEDURE — 0RQJ4ZZ REPAIR RIGHT SHOULDER JOINT, PERCUTANEOUS ENDOSCOPIC APPROACH: ICD-10-PCS | Performed by: ORTHOPAEDIC SURGERY

## 2021-09-20 DEVICE — FIBERTAK
Type: IMPLANTABLE DEVICE | Site: SHOULDER | Status: FUNCTIONAL
Brand: ARTHREX®

## 2021-09-20 DEVICE — FIBERTAK
Type: IMPLANTABLE DEVICE | Status: FUNCTIONAL
Brand: ARTHREX®

## 2021-09-20 DEVICE — SUTR ANCHBIO-COMP S-TAK KNOTLESS
Type: IMPLANTABLE DEVICE | Site: SHOULDER | Status: FUNCTIONAL
Brand: ARTHREX®

## 2021-09-20 RX ORDER — DEXAMETHASONE SODIUM PHOSPHATE 10 MG/ML
INJECTION, SOLUTION INTRAMUSCULAR; INTRAVENOUS AS NEEDED
Status: DISCONTINUED | OUTPATIENT
Start: 2021-09-20 | End: 2021-09-20 | Stop reason: SURG

## 2021-09-20 RX ORDER — TRANEXAMIC ACID 10 MG/ML
INJECTION, SOLUTION INTRAVENOUS AS NEEDED
Status: DISCONTINUED | OUTPATIENT
Start: 2021-09-20 | End: 2021-09-20 | Stop reason: SURG

## 2021-09-20 RX ORDER — ROCURONIUM BROMIDE 10 MG/ML
INJECTION, SOLUTION INTRAVENOUS AS NEEDED
Status: DISCONTINUED | OUTPATIENT
Start: 2021-09-20 | End: 2021-09-20 | Stop reason: SURG

## 2021-09-20 RX ORDER — KETOROLAC TROMETHAMINE 30 MG/ML
INJECTION, SOLUTION INTRAMUSCULAR; INTRAVENOUS AS NEEDED
Status: DISCONTINUED | OUTPATIENT
Start: 2021-09-20 | End: 2021-09-20 | Stop reason: SURG

## 2021-09-20 RX ORDER — HYDROMORPHONE HYDROCHLORIDE 1 MG/ML
0.4 INJECTION, SOLUTION INTRAMUSCULAR; INTRAVENOUS; SUBCUTANEOUS EVERY 5 MIN PRN
Status: DISCONTINUED | OUTPATIENT
Start: 2021-09-20 | End: 2021-09-20

## 2021-09-20 RX ORDER — ONDANSETRON 2 MG/ML
4 INJECTION INTRAMUSCULAR; INTRAVENOUS AS NEEDED
Status: DISCONTINUED | OUTPATIENT
Start: 2021-09-20 | End: 2021-09-20

## 2021-09-20 RX ORDER — PHENYLEPHRINE HCL 10 MG/ML
VIAL (ML) INJECTION AS NEEDED
Status: DISCONTINUED | OUTPATIENT
Start: 2021-09-20 | End: 2021-09-20 | Stop reason: SURG

## 2021-09-20 RX ORDER — SODIUM CHLORIDE, SODIUM LACTATE, POTASSIUM CHLORIDE, CALCIUM CHLORIDE 600; 310; 30; 20 MG/100ML; MG/100ML; MG/100ML; MG/100ML
INJECTION, SOLUTION INTRAVENOUS CONTINUOUS
Status: DISCONTINUED | OUTPATIENT
Start: 2021-09-20 | End: 2021-09-20

## 2021-09-20 RX ORDER — ONDANSETRON 2 MG/ML
INJECTION INTRAMUSCULAR; INTRAVENOUS AS NEEDED
Status: DISCONTINUED | OUTPATIENT
Start: 2021-09-20 | End: 2021-09-20 | Stop reason: SURG

## 2021-09-20 RX ORDER — HYDROCODONE BITARTRATE AND ACETAMINOPHEN 5; 325 MG/1; MG/1
1 TABLET ORAL AS NEEDED
Status: DISCONTINUED | OUTPATIENT
Start: 2021-09-20 | End: 2021-09-20

## 2021-09-20 RX ORDER — MIDAZOLAM HYDROCHLORIDE 1 MG/ML
INJECTION INTRAMUSCULAR; INTRAVENOUS AS NEEDED
Status: DISCONTINUED | OUTPATIENT
Start: 2021-09-20 | End: 2021-09-20 | Stop reason: SURG

## 2021-09-20 RX ORDER — BUPRENORPHINE HYDROCHLORIDE 0.32 MG/ML
INJECTION INTRAMUSCULAR; INTRAVENOUS AS NEEDED
Status: DISCONTINUED | OUTPATIENT
Start: 2021-09-20 | End: 2021-09-20 | Stop reason: SURG

## 2021-09-20 RX ORDER — LABETALOL HYDROCHLORIDE 5 MG/ML
5 INJECTION, SOLUTION INTRAVENOUS EVERY 5 MIN PRN
Status: DISCONTINUED | OUTPATIENT
Start: 2021-09-20 | End: 2021-09-20

## 2021-09-20 RX ORDER — BUPIVACAINE HYDROCHLORIDE 5 MG/ML
INJECTION, SOLUTION EPIDURAL; INTRACAUDAL AS NEEDED
Status: DISCONTINUED | OUTPATIENT
Start: 2021-09-20 | End: 2021-09-20 | Stop reason: HOSPADM

## 2021-09-20 RX ORDER — SCOLOPAMINE TRANSDERMAL SYSTEM 1 MG/1
1 PATCH, EXTENDED RELEASE TRANSDERMAL
Status: DISCONTINUED | OUTPATIENT
Start: 2021-09-20 | End: 2021-09-20

## 2021-09-20 RX ORDER — LIDOCAINE HYDROCHLORIDE 10 MG/ML
INJECTION, SOLUTION EPIDURAL; INFILTRATION; INTRACAUDAL; PERINEURAL AS NEEDED
Status: DISCONTINUED | OUTPATIENT
Start: 2021-09-20 | End: 2021-09-20 | Stop reason: SURG

## 2021-09-20 RX ORDER — METOCLOPRAMIDE HYDROCHLORIDE 5 MG/ML
10 INJECTION INTRAMUSCULAR; INTRAVENOUS AS NEEDED
Status: DISCONTINUED | OUTPATIENT
Start: 2021-09-20 | End: 2021-09-20

## 2021-09-20 RX ORDER — ACETAMINOPHEN 500 MG
1000 TABLET ORAL ONCE AS NEEDED
Status: DISCONTINUED | OUTPATIENT
Start: 2021-09-20 | End: 2021-09-20

## 2021-09-20 RX ORDER — METOCLOPRAMIDE HYDROCHLORIDE 5 MG/ML
INJECTION INTRAMUSCULAR; INTRAVENOUS AS NEEDED
Status: DISCONTINUED | OUTPATIENT
Start: 2021-09-20 | End: 2021-09-20 | Stop reason: SURG

## 2021-09-20 RX ORDER — ACETAMINOPHEN 500 MG
1000 TABLET ORAL EVERY 6 HOURS PRN
COMMUNITY

## 2021-09-20 RX ORDER — DIPHENHYDRAMINE HYDROCHLORIDE 50 MG/ML
12.5 INJECTION INTRAMUSCULAR; INTRAVENOUS AS NEEDED
Status: DISCONTINUED | OUTPATIENT
Start: 2021-09-20 | End: 2021-09-20

## 2021-09-20 RX ORDER — DEXAMETHASONE SODIUM PHOSPHATE 4 MG/ML
VIAL (ML) INJECTION AS NEEDED
Status: DISCONTINUED | OUTPATIENT
Start: 2021-09-20 | End: 2021-09-20 | Stop reason: SURG

## 2021-09-20 RX ORDER — GLYCOPYRROLATE 0.2 MG/ML
INJECTION, SOLUTION INTRAMUSCULAR; INTRAVENOUS AS NEEDED
Status: DISCONTINUED | OUTPATIENT
Start: 2021-09-20 | End: 2021-09-20 | Stop reason: SURG

## 2021-09-20 RX ORDER — NALOXONE HYDROCHLORIDE 0.4 MG/ML
80 INJECTION, SOLUTION INTRAMUSCULAR; INTRAVENOUS; SUBCUTANEOUS AS NEEDED
Status: DISCONTINUED | OUTPATIENT
Start: 2021-09-20 | End: 2021-09-20

## 2021-09-20 RX ORDER — MEPERIDINE HYDROCHLORIDE 25 MG/ML
12.5 INJECTION INTRAMUSCULAR; INTRAVENOUS; SUBCUTANEOUS AS NEEDED
Status: DISCONTINUED | OUTPATIENT
Start: 2021-09-20 | End: 2021-09-20

## 2021-09-20 RX ORDER — HYDROCODONE BITARTRATE AND ACETAMINOPHEN 5; 325 MG/1; MG/1
2 TABLET ORAL AS NEEDED
Status: DISCONTINUED | OUTPATIENT
Start: 2021-09-20 | End: 2021-09-20

## 2021-09-20 RX ADMIN — DEXAMETHASONE SODIUM PHOSPHATE 2 MG: 10 INJECTION, SOLUTION INTRAMUSCULAR; INTRAVENOUS at 07:17:00

## 2021-09-20 RX ADMIN — ONDANSETRON 4 MG: 2 INJECTION INTRAMUSCULAR; INTRAVENOUS at 10:08:00

## 2021-09-20 RX ADMIN — DEXAMETHASONE SODIUM PHOSPHATE 8 MG: 4 MG/ML VIAL (ML) INJECTION at 07:59:00

## 2021-09-20 RX ADMIN — GLYCOPYRROLATE 0.2 MG: 0.2 INJECTION, SOLUTION INTRAMUSCULAR; INTRAVENOUS at 07:22:00

## 2021-09-20 RX ADMIN — ROCURONIUM BROMIDE 40 MG: 10 INJECTION, SOLUTION INTRAVENOUS at 07:25:00

## 2021-09-20 RX ADMIN — MIDAZOLAM HYDROCHLORIDE 2 MG: 1 INJECTION INTRAMUSCULAR; INTRAVENOUS at 07:10:00

## 2021-09-20 RX ADMIN — PHENYLEPHRINE HCL 50 MCG: 10 MG/ML VIAL (ML) INJECTION at 07:56:00

## 2021-09-20 RX ADMIN — KETOROLAC TROMETHAMINE 30 MG: 30 INJECTION, SOLUTION INTRAMUSCULAR; INTRAVENOUS at 10:08:00

## 2021-09-20 RX ADMIN — ROCURONIUM BROMIDE 30 MG: 10 INJECTION, SOLUTION INTRAVENOUS at 08:46:00

## 2021-09-20 RX ADMIN — METOCLOPRAMIDE HYDROCHLORIDE 10 MG: 5 INJECTION INTRAMUSCULAR; INTRAVENOUS at 07:22:00

## 2021-09-20 RX ADMIN — SODIUM CHLORIDE, SODIUM LACTATE, POTASSIUM CHLORIDE, CALCIUM CHLORIDE: 600; 310; 30; 20 INJECTION, SOLUTION INTRAVENOUS at 09:39:00

## 2021-09-20 RX ADMIN — MIDAZOLAM HYDROCHLORIDE 2 MG: 1 INJECTION INTRAMUSCULAR; INTRAVENOUS at 07:07:00

## 2021-09-20 RX ADMIN — ROCURONIUM BROMIDE 10 MG: 10 INJECTION, SOLUTION INTRAVENOUS at 07:22:00

## 2021-09-20 RX ADMIN — BUPRENORPHINE HYDROCHLORIDE 150 MCG: 0.32 INJECTION INTRAMUSCULAR; INTRAVENOUS at 07:17:00

## 2021-09-20 RX ADMIN — SODIUM CHLORIDE, SODIUM LACTATE, POTASSIUM CHLORIDE, CALCIUM CHLORIDE: 600; 310; 30; 20 INJECTION, SOLUTION INTRAVENOUS at 07:07:00

## 2021-09-20 RX ADMIN — TRANEXAMIC ACID 1000 MG: 10 INJECTION, SOLUTION INTRAVENOUS at 08:01:00

## 2021-09-20 RX ADMIN — LIDOCAINE HYDROCHLORIDE 50 MG: 10 INJECTION, SOLUTION EPIDURAL; INFILTRATION; INTRACAUDAL; PERINEURAL at 07:22:00

## 2021-09-20 NOTE — OPERATIVE REPORT
[facility] OPERATIVE RECORD  ATTENDING SURGEON: Dior Guerra MD  CO-SURGEON:  Samuel Urbina MD  PRELIMINARY DIAGNOSIS:  1. Right shoulder posterior instability     POSTOPERATIVE DIAGNOSIS:  1. Right shoulder posterior instability  2.   Right sh at the conclusion of the procedure.     Indications:  Tavo Rodriguez is a 39year old female with unique history, physical examination, and imaging findings consistent posterior instability with manifestation with complex regional pain within the posterior scapular introduced into the glenohumeral joint. A standard anterior portal was established within the rotator internal and a probe was introduced into the glenohumeral joint.    Diagnostic arthroscopy revealed:    Biceps Cherokee Absent s/p tenotomy   Superior labrum of the procedure. A towel was used to extract fluid from the arthroscopic incisions and closure was performed using interrupted 3-0 nylon sutures. The patient was placed into a Shoulder Immobilizer.   Danika was taken to the recovery room in a stable co

## 2021-09-20 NOTE — ANESTHESIA PREPROCEDURE EVALUATION
PRE-OP EVALUATION    Patient Name: Faizan Leon    Admit Diagnosis: Shoulder instability, right [M25.311]    Pre-op Diagnosis: Shoulder instability, right [M25.311]    RIGHT SHOULDER ARTHROSCOPY, LABRAL/ CAPSULAR REPAIR    Anesthesia Procedure: RIG time  traZODone HCl 50 MG Oral Tab, TAKE 1 TABLET BY MOUTH AT NIGHT FOR 90 DAYS, Disp: , Rfl: , 9/19/2021 at 2230  ergocalciferol 21852 units Oral Cap, Take 1 capsule (50,000 Units total) by mouth once a week., Disp: 12 capsule, Rfl: 0  topiramate 50 MG Or 02/05/2021    Dr Charanjit Luke     Social History    Tobacco Use      Smoking status: Never Smoker      Smokeless tobacco: Never Used    Alcohol use: Yes      Drug use: No     Available pre-op labs reviewed.   Lab Results   Component Value Date    WBC 7.17 08/25/

## 2021-09-20 NOTE — ANESTHESIA POSTPROCEDURE EVALUATION
[address]  Patient Status:  Hospital Outpatient Surgery   Age/Gender 39year old female MRN [id number]   Location [address] [place] [address] Attending Toribio Mims MD   Hosp Day # 0 PCP MD Jon Sweeney

## 2021-09-20 NOTE — ANESTHESIA PROCEDURE NOTES
Airway  Date/Time: 9/20/2021 7:22 AM  Urgency: elective    Airway not difficult    General Information and Staff    Patient location during procedure: OR  Anesthesiologist: Mackenzie Blank MD  Performed: anesthesiologist     Indications and Patient Condi

## 2021-09-20 NOTE — ANESTHESIA PROCEDURE NOTES
[facility]  Performed by: Mckenzie Prakash MD  Authorized by: Mckenzie Prakash MD       General Information and Staff    Start Time:  9/20/2021 7:07 AM  End Time:  9/20/2021 7:14 AM  Anesthesiologist:  Mckenzie Prakash MD  Performed by:   Anesthesiol and buprenorphine 150mcg

## 2021-09-20 NOTE — ANESTHESIA PROCEDURE NOTES
Peripheral IV  Date/Time: 9/20/2021 7:20 AM  Inserted by: Desiree Guido MD    Placement  Needle size: 25 G  Laterality: left  Location: arm  Local anesthetic: none  Site prep: alcohol  Technique: anatomical landmarks  Attempts: 2

## 2021-09-20 NOTE — INTERVAL H&P NOTE
Pre-op Diagnosis: Shoulder instability, right [M25.311]    The above referenced H&P was reviewed by Andrews Squires MD on 9/20/2021, the patient was examined and no significant changes have occurred in the patient's condition since the H&P was performed.

## 2021-09-20 NOTE — BRIEF OP NOTE
Pre-Operative Diagnosis: Shoulder instability, right [M25.311]     Post-Operative Diagnosis: Shoulder instability, right [M25.311]      Procedure Performed:   RIGHT SHOULDER ARTHROSCOPY, LABRAL/ CAPSULAR REPAIR    Surgeon(s) and Role:     * Ryland Mims

## 2021-09-22 NOTE — ED INITIAL ASSESSMENT (HPI)
Pt c/o possible cellulitis to lower left thigh and posterior upper calf - started Friday w/ increasing redness     Denies hx of blood clots [Fever] : no fever [Chills] : no chills [Eye Pain] : no eye pain [Dry Eyes] : no dryness of the eyes [Sore Throat] : no sore throat [Hoarseness] : no hoarseness [Chest Pain] : no chest pain [Palpitations] : no palpitations [Lower Ext Edema] : no lower extremity edema [Shortness Of Breath] : no shortness of breath [SOB on Exertion] : no shortness of breath during exertion [Arthralgias] : arthralgias [Joint Swelling] : no joint swelling [Joint Stiffness] : joint stiffness [Skin Lesions] : skin lesion [Limb Weakness] : no limb weakness [Difficulty Walking] : no difficulty walking [Depression] : depression

## 2021-10-15 ENCOUNTER — HOSPITAL ENCOUNTER (EMERGENCY)
Facility: HOSPITAL | Age: 46
Discharge: HOME | End: 2021-10-15
Attending: EMERGENCY MEDICINE
Payer: COMMERCIAL

## 2021-10-15 VITALS
HEART RATE: 81 BPM | RESPIRATION RATE: 16 BRPM | SYSTOLIC BLOOD PRESSURE: 122 MMHG | BODY MASS INDEX: 44.41 KG/M2 | WEIGHT: 293 LBS | DIASTOLIC BLOOD PRESSURE: 62 MMHG | TEMPERATURE: 98 F | HEIGHT: 68 IN | OXYGEN SATURATION: 98 %

## 2021-10-15 DIAGNOSIS — L03.115 CELLULITIS OF RIGHT LOWER EXTREMITY: Primary | ICD-10-CM

## 2021-10-15 PROCEDURE — 87186 SC STD MICRODIL/AGAR DIL: CPT | Performed by: CLINICAL MEDICAL LABORATORY

## 2021-10-15 PROCEDURE — 96365 THER/PROPH/DIAG IV INF INIT: CPT

## 2021-10-15 PROCEDURE — 87077 CULTURE AEROBIC IDENTIFY: CPT | Performed by: CLINICAL MEDICAL LABORATORY

## 2021-10-15 PROCEDURE — 80053 COMPREHEN METABOLIC PANEL: CPT | Performed by: EMERGENCY MEDICINE

## 2021-10-15 PROCEDURE — 87070 CULTURE OTHR SPECIMN AEROBIC: CPT | Performed by: CLINICAL MEDICAL LABORATORY

## 2021-10-15 PROCEDURE — 99284 EMERGENCY DEPT VISIT MOD MDM: CPT

## 2021-10-15 PROCEDURE — 85025 COMPLETE CBC W/AUTO DIFF WBC: CPT | Performed by: EMERGENCY MEDICINE

## 2021-10-15 PROCEDURE — 87205 SMEAR GRAM STAIN: CPT | Performed by: CLINICAL MEDICAL LABORATORY

## 2021-10-15 RX ORDER — SULFAMETHOXAZOLE AND TRIMETHOPRIM 800; 160 MG/1; MG/1
1 TABLET ORAL 2 TIMES DAILY
Qty: 20 TABLET | Refills: 0 | Status: SHIPPED | OUTPATIENT
Start: 2021-10-15 | End: 2021-10-25

## 2021-10-16 ENCOUNTER — LAB REQUISITION (OUTPATIENT)
Dept: LAB | Age: 46
End: 2021-10-16

## 2021-10-16 DIAGNOSIS — L97.819 NON-PRESSURE CHRONIC ULCER OF OTHER PART OF RIGHT LOWER LEG WITH UNSPECIFIED SEVERITY (CMD): ICD-10-CM

## 2021-10-16 DIAGNOSIS — L97.319 NON-PRESSURE CHRONIC ULCER OF RIGHT ANKLE WITH UNSPECIFIED SEVERITY (CMD): ICD-10-CM

## 2021-10-16 DIAGNOSIS — I87.2 VENOUS INSUFFICIENCY (CHRONIC) (PERIPHERAL): ICD-10-CM

## 2021-10-16 NOTE — PROGRESS NOTES
[facility] Pharmacy Note: Antimicrobial Weight Based Dose Adjustment for: ceftriaxone (ROCEPHIN)    Maria Elena Martinez is a 55year old patient who has been prescribed ceftriaxone (ROCEPHIN) 1000 mg once.     CrCl cannot be calculated (Patient's most recent lab

## 2021-10-16 NOTE — ED PROVIDER NOTES
Patient Seen in: [facility] Emergency Department      History   Patient presents with:  Cellulitis    Stated Complaint: cellulitis    Subjective:   HPI    Patient is a 24-year-old woman here concern about cellulitis.     Had ulcers on her anterior ri noted above.     Physical Exam     ED Triage Vitals [10/15/21 1633]   BP (!) 148/95   Pulse 91   Resp 20   Temp 97.5 °F (36.4 °C)   Temp src Temporal   SpO2 100 %   O2 Device None (Room air)       Current:/62   Pulse 81   Temp 97.5 °F (36.4 °C) (Tempo Differential With Platelet.   Procedure                               Abnormality         Status                     ---------                               -----------         ------                     CBC W/ DIFFERENTIAL[[id number]]          Abnormal

## 2021-10-19 LAB
BACTERIA SPEC AEROBE CULT: ABNORMAL
GRAM STN SPEC: ABNORMAL

## 2021-11-04 ENCOUNTER — OFFICE VISIT (OUTPATIENT)
Dept: WOUND CARE | Facility: HOSPITAL | Age: 46
End: 2021-11-04
Attending: FAMILY MEDICINE
Payer: COMMERCIAL

## 2021-11-04 VITALS
HEART RATE: 70 BPM | RESPIRATION RATE: 18 BRPM | BODY MASS INDEX: 44.41 KG/M2 | WEIGHT: 293 LBS | DIASTOLIC BLOOD PRESSURE: 65 MMHG | SYSTOLIC BLOOD PRESSURE: 117 MMHG | TEMPERATURE: 99 F | HEIGHT: 68 IN

## 2021-11-04 DIAGNOSIS — E66.01 MORBID OBESITY (HCC): ICD-10-CM

## 2021-11-04 DIAGNOSIS — S81.801A OPEN WOUND OF RIGHT LOWER LEG, INITIAL ENCOUNTER: ICD-10-CM

## 2021-11-04 DIAGNOSIS — I89.0 CHRONIC ACQUIRED LYMPHEDEMA: Primary | ICD-10-CM

## 2021-11-04 PROCEDURE — 99204 OFFICE O/P NEW MOD 45 MIN: CPT | Performed by: FAMILY MEDICINE

## 2021-11-04 NOTE — PROGRESS NOTES
Patient presents with:  Wound Care: Patient is here for an initial consult. She presents with a wound on her right lower leg that opened up in early September. She has ordered compression stockings, but they have not yet arrived.        HPI:     49-year-old times daily. , Disp: 90 tablet, Rfl: 0  •  Naltrexone Does not apply Powder, Take 1 capsule 4.5mg  a day (Patient taking differently: Take 1 capsule 4.5mg  a day), Disp: 0.135 g, Rfl: 1  •  UBRELVY 100 MG Oral Tab, TAKE 1 TABLET BY MOUTH AT MIGRAINE ONSET. apparent lesions.    Cardiac:  S1 S2 regular rate and rhythm, no murmur, gallop, or rub  Respiratory:  Bilaterally clear to auscultation, no chest tenderness to palpation, no wheezing, no rhonchi, no rales, air entry is adequate, no accessory respiratory mu COMPRESSION THERAPY Routine Completed Sepideh Genao MD     - Type:    Comprilan     - Location:    Right leg       Compression Wrap 11/04/21 Leg Right (Active)   Placement Date/Time: 11/04/21 1336   Location: Leg  Wound Location Orientation: Right evaluation, counseling and educating the patient, and documenting in the record. I agree with staff documentation except for any changes made in my note. Gerhardt Guile M.D.    [facility] Wound and Hyperbaric   11/4/2021

## 2021-11-04 NOTE — PATIENT INSTRUCTIONS
PATIENT INSTRUCTIONS      FOR GRICELDA Lipscomb 10/10/1975    DATE OF SERVICE: 2021        Hydrofera Blue Transfer and Kerramax to the wound base    Comprilan compression wrap to right lower extremity      Follow up with Dr. Davis Sor

## 2021-11-04 NOTE — PROGRESS NOTES
Weekly Wound Education Note    Teaching Provided To: Patient  Training Topics: Dressing;Edema control;Cleasing and general instructions; Compression; Discharge instructions  Training Method: Explain/Verbal;Demonstration  Training Response: Patient responds a

## 2021-11-06 ENCOUNTER — LAB ENCOUNTER (OUTPATIENT)
Dept: LAB | Age: 46
End: 2021-11-06
Attending: INTERNAL MEDICINE
Payer: COMMERCIAL

## 2021-11-06 DIAGNOSIS — Z12.11 COLON CANCER SCREENING: ICD-10-CM

## 2021-11-07 PROBLEM — M99.07 SOMATIC DYSFUNCTION OF RIGHT UPPER EXTREMITY: Status: ACTIVE | Noted: 2021-11-07

## 2021-11-07 PROBLEM — Z98.890 S/P ARTHROSCOPY OF RIGHT SHOULDER: Status: ACTIVE | Noted: 2021-11-07

## 2021-11-07 PROBLEM — M99.08: Status: ACTIVE | Noted: 2021-11-07

## 2021-11-07 PROBLEM — M99.01 SOMATIC DYSFUNCTION OF CERVICAL REGION: Status: ACTIVE | Noted: 2021-11-07

## 2021-11-07 PROBLEM — M99.09 SOMATIC DYSFUNCTION OF ABDOMINAL REGION: Status: ACTIVE | Noted: 2021-11-07

## 2021-11-07 PROBLEM — M99.02 SOMATIC DYSFUNCTION OF THORACIC REGION: Status: ACTIVE | Noted: 2021-11-07

## 2021-11-08 ENCOUNTER — TELEPHONE (OUTPATIENT)
Dept: WOUND CARE | Facility: HOSPITAL | Age: 46
End: 2021-11-08

## 2021-11-08 NOTE — TELEPHONE ENCOUNTER
Spoke with patient who states she and her  removed the wraps a couple days ago and he rewrapped them with the comprilan wraps.   Writer asked if he knew how to wrap with the short stretch bandages and she said he did not but the wraps feel good and s

## 2021-11-08 NOTE — TELEPHONE ENCOUNTER
Return a call from a patient and offer her to come in today for wrap change she has to quarantine for her colonoscopy tomorrow. Patient said that her wrap was falling down this weekend, Her  did re-wrap her legs. Patient is coming on Thursday to see the Provider.

## 2021-11-09 ENCOUNTER — HOSPITAL ENCOUNTER (OUTPATIENT)
Facility: HOSPITAL | Age: 46
Setting detail: HOSPITAL OUTPATIENT SURGERY
Discharge: HOME | End: 2021-11-09
Attending: INTERNAL MEDICINE | Admitting: INTERNAL MEDICINE
Payer: COMMERCIAL

## 2021-11-09 ENCOUNTER — ANESTHESIA (OUTPATIENT)
Dept: ENDOSCOPY | Facility: HOSPITAL | Age: 46
End: 2021-11-09
Payer: COMMERCIAL

## 2021-11-09 ENCOUNTER — APPOINTMENT (OUTPATIENT)
Dept: WOUND CARE | Facility: HOSPITAL | Age: 46
End: 2021-11-09
Attending: FAMILY MEDICINE
Payer: COMMERCIAL

## 2021-11-09 ENCOUNTER — ANESTHESIA EVENT (OUTPATIENT)
Dept: ENDOSCOPY | Facility: HOSPITAL | Age: 46
End: 2021-11-09
Payer: COMMERCIAL

## 2021-11-09 VITALS
HEART RATE: 79 BPM | WEIGHT: 293 LBS | SYSTOLIC BLOOD PRESSURE: 125 MMHG | OXYGEN SATURATION: 99 % | BODY MASS INDEX: 44.41 KG/M2 | RESPIRATION RATE: 20 BRPM | TEMPERATURE: 98 F | DIASTOLIC BLOOD PRESSURE: 89 MMHG | HEIGHT: 68 IN

## 2021-11-09 DIAGNOSIS — Z12.11 COLON CANCER SCREENING: Primary | ICD-10-CM

## 2021-11-09 PROCEDURE — 0DJD8ZZ INSPECTION OF LOWER INTESTINAL TRACT, VIA NATURAL OR ARTIFICIAL OPENING ENDOSCOPIC: ICD-10-PCS | Performed by: INTERNAL MEDICINE

## 2021-11-09 PROCEDURE — 88305 TISSUE EXAM BY PATHOLOGIST: CPT | Performed by: INTERNAL MEDICINE

## 2021-11-09 PROCEDURE — 76937 US GUIDE VASCULAR ACCESS: CPT

## 2021-11-09 PROCEDURE — 0DBE8ZX EXCISION OF LARGE INTESTINE, VIA NATURAL OR ARTIFICIAL OPENING ENDOSCOPIC, DIAGNOSTIC: ICD-10-PCS | Performed by: INTERNAL MEDICINE

## 2021-11-09 PROCEDURE — 36410 VNPNXR 3YR/> PHY/QHP DX/THER: CPT

## 2021-11-09 RX ORDER — SODIUM CHLORIDE, SODIUM LACTATE, POTASSIUM CHLORIDE, CALCIUM CHLORIDE 600; 310; 30; 20 MG/100ML; MG/100ML; MG/100ML; MG/100ML
INJECTION, SOLUTION INTRAVENOUS CONTINUOUS
Status: DISCONTINUED | OUTPATIENT
Start: 2021-11-09 | End: 2021-11-09

## 2021-11-09 RX ORDER — SODIUM CHLORIDE, SODIUM LACTATE, POTASSIUM CHLORIDE, CALCIUM CHLORIDE 600; 310; 30; 20 MG/100ML; MG/100ML; MG/100ML; MG/100ML
INJECTION, SOLUTION INTRAVENOUS CONTINUOUS PRN
Status: DISCONTINUED | OUTPATIENT
Start: 2021-11-09 | End: 2021-11-09 | Stop reason: SURG

## 2021-11-09 RX ORDER — NALOXONE HYDROCHLORIDE 0.4 MG/ML
80 INJECTION, SOLUTION INTRAMUSCULAR; INTRAVENOUS; SUBCUTANEOUS AS NEEDED
Status: DISCONTINUED | OUTPATIENT
Start: 2021-11-09 | End: 2021-11-09

## 2021-11-09 RX ORDER — LIDOCAINE HYDROCHLORIDE 10 MG/ML
INJECTION, SOLUTION EPIDURAL; INFILTRATION; INTRACAUDAL; PERINEURAL AS NEEDED
Status: DISCONTINUED | OUTPATIENT
Start: 2021-11-09 | End: 2021-11-09 | Stop reason: SURG

## 2021-11-09 RX ADMIN — SODIUM CHLORIDE, SODIUM LACTATE, POTASSIUM CHLORIDE, CALCIUM CHLORIDE: 600; 310; 30; 20 INJECTION, SOLUTION INTRAVENOUS at 10:52:00

## 2021-11-09 RX ADMIN — LIDOCAINE HYDROCHLORIDE 50 MG: 10 INJECTION, SOLUTION EPIDURAL; INFILTRATION; INTRACAUDAL; PERINEURAL at 10:56:00

## 2021-11-09 NOTE — ANESTHESIA PREPROCEDURE EVALUATION
PRE-OP EVALUATION    Patient Name: Sangita Bolanos    Admit Diagnosis: Colon cancer screening [Z12.11]    Pre-op Diagnosis: Colon cancer screening [Z12.11]    COLONOSCOPY    Anesthesia Procedure: COLONOSCOPY (N/A )    Surgeon(s) and Role:     * Chandu Jauregui Date    WBC 8.1 10/15/2021    WBC 7.17 08/25/2021    RBC 4.44 10/15/2021    RBC 5.09 08/25/2021    HGB 12.4 10/15/2021    HGB 13.7 08/25/2021    HCT 38.4 10/15/2021    HCT 46.2 08/25/2021    MCV 86.5 10/15/2021    MCV 90.8 08/25/2021    MCH 27.9 10/15/2021

## 2021-11-09 NOTE — ANESTHESIA POSTPROCEDURE EVALUATION
[address] Patient Status:  Hospital Outpatient Surgery   Age/Gender 55year old female MRN [id number]   Location [id number] [place] [address] Attending No att. providers found   Hosp Day # 0 PCP Marcus Flores MD       Ane

## 2021-11-09 NOTE — OPERATIVE REPORT
Colonoscopy Operative Report    Davidjoey Ochoa Patient Status:  Hospital Outpatient Surgery    10/10/1975 MRN [id number]   Location [facility] [employer] Attending Giovanni Charles MD   [facility] Day # pathology  Colon cancer screening 10 years  Discharge: The patient was given an after visit summary detailing the procedure, findings, recommendations and follow up plans.      Nikky Alanis MD  11/9/2021  9:20 AM

## 2021-11-09 NOTE — H&P
[address] Patient Status:  Hospital Outpatient Surgery    10/10/1975 MRN [id number]   [facility] PERIOPERATIVE Attending Seda Traore MD   Hosp Day # 0 PCP Estrella Lizarraga MD     Date:   status: Never Smoker      Smokeless tobacco: Never Used    Vaping Use      Vaping Use: Never used    Alcohol use: Yes      Alcohol/week: 0.0 - 2.0 standard drinks    Drug use: No    Allergies/Medications:    Allergies:   Neosporin [Neomycin*    SWELLING currently breastfeeding.      General appearance:  alert, appears stated age and cooperative  Head: Normocephalic, without obvious abnormality, atraumatic  Pulmonary: clear to auscultation bilaterally  Cardiovascular: S1, S2 normal, no murmur, click, rub or

## 2021-11-11 ENCOUNTER — TELEPHONE (OUTPATIENT)
Dept: ORTHOPEDICS CLINIC | Facility: CLINIC | Age: 46
End: 2021-11-11

## 2021-11-11 ENCOUNTER — OFFICE VISIT (OUTPATIENT)
Dept: WOUND CARE | Facility: HOSPITAL | Age: 46
End: 2021-11-11
Attending: FAMILY MEDICINE
Payer: COMMERCIAL

## 2021-11-11 VITALS
SYSTOLIC BLOOD PRESSURE: 122 MMHG | TEMPERATURE: 98 F | DIASTOLIC BLOOD PRESSURE: 73 MMHG | RESPIRATION RATE: 16 BRPM | HEART RATE: 86 BPM

## 2021-11-11 DIAGNOSIS — E66.01 MORBID OBESITY (HCC): ICD-10-CM

## 2021-11-11 DIAGNOSIS — S81.801D OPEN WOUND OF RIGHT LOWER LEG, SUBSEQUENT ENCOUNTER: Primary | ICD-10-CM

## 2021-11-11 DIAGNOSIS — I89.0 CHRONIC ACQUIRED LYMPHEDEMA: ICD-10-CM

## 2021-11-11 PROCEDURE — 99214 OFFICE O/P EST MOD 30 MIN: CPT | Performed by: FAMILY MEDICINE

## 2021-11-11 NOTE — PROGRESS NOTES
11/11/2021  Rafy Horne  [address] [facility] [address]    Dear Ankit Cervantes,       Here are the  biopsy/pathology findings from your recent Colonoscopy :    a normal biopsy, no evidence of inflammation or colitis.       Follow-up information:

## 2021-11-11 NOTE — PATIENT INSTRUCTIONS
PATIENT INSTRUCTIONS      FOR Danika Sierra , GRICELDA 10/10/1975    DATE OF SERVICE: 2021        Hydrofera Blue Transfer and Kerramax to the wound base    Comprilan compression wrap to right lower extremity      Follow up with Dr. Jenene Cabot on [facility]

## 2021-11-11 NOTE — PROGRESS NOTES
Patient presents with:  Wound Care: Patients is here for a follow up. Patients took off his wrap her on monday due to the burning sensation on the leg. HPI:     Patient is here for follow-up for right lower extremity wound.   It is much better now and •  topiramate 50 MG Oral Tab, Take 100 mg by mouth 2 (two) times daily. , Disp: , Rfl:   •  ARIPiprazole 20 MG Oral Tab, Take 10 mg by mouth daily.   , Disp: , Rfl:   •  TiZANidine HCl 4 MG Oral Tab, Take 4 mg by mouth nightly., Disp: , Rfl:   •  TraMADol Point of Measurement - Right Calf: 34        Right Calf from[de-identified] Heel  Right Calf cm[de-identified] 66    Ankle     Point of Measurement - Right Ankle: 10           Right Ankle from[de-identified] Heel  Right Ankle cm[de-identified] 39       Foot                            Wound 11/04/21 #1 Rig Placement Date/Time: 11/04/21 1336   Location: Leg  Wound Location Orientation: Right      Assessments 11/4/2021  1:36 PM   Response to Treatment Well tolerated   Compression Layers Multilayer   Compression Product Type Stockinette 4in; Comprilan 8cm; Comp

## 2021-11-11 NOTE — TELEPHONE ENCOUNTER
Please advise if additional imaging is needed    S/p right shoulder scope 9/20/21.  Patient is progressing with therapy no major issues-Dr Mims

## 2021-11-11 NOTE — TELEPHONE ENCOUNTER
Patient coming in for follow up care after arthroscopy sx with . Per Patient  is leaving and would like patient to continue care with Stanislaw Herron. Patient has previously seen Stanislaw Herron. Please place RX for any imaging if needed.

## 2021-11-16 ENCOUNTER — APPOINTMENT (OUTPATIENT)
Dept: WOUND CARE | Facility: HOSPITAL | Age: 46
End: 2021-11-16
Attending: FAMILY MEDICINE
Payer: COMMERCIAL

## 2021-11-18 ENCOUNTER — APPOINTMENT (OUTPATIENT)
Dept: WOUND CARE | Facility: HOSPITAL | Age: 46
End: 2021-11-18
Attending: FAMILY MEDICINE
Payer: COMMERCIAL

## 2021-11-30 ENCOUNTER — OFFICE VISIT (OUTPATIENT)
Dept: WOUND CARE | Facility: HOSPITAL | Age: 46
End: 2021-11-30
Attending: FAMILY MEDICINE
Payer: COMMERCIAL

## 2021-11-30 VITALS
SYSTOLIC BLOOD PRESSURE: 117 MMHG | DIASTOLIC BLOOD PRESSURE: 72 MMHG | RESPIRATION RATE: 18 BRPM | HEART RATE: 99 BPM | TEMPERATURE: 98 F

## 2021-11-30 DIAGNOSIS — E66.01 MORBID OBESITY (HCC): ICD-10-CM

## 2021-11-30 DIAGNOSIS — L08.9 INFECTED WOUND: ICD-10-CM

## 2021-11-30 DIAGNOSIS — S81.801D OPEN WOUND OF RIGHT LOWER LEG, SUBSEQUENT ENCOUNTER: Primary | ICD-10-CM

## 2021-11-30 DIAGNOSIS — T14.8XXA INFECTED WOUND: ICD-10-CM

## 2021-11-30 DIAGNOSIS — I89.0 CHRONIC ACQUIRED LYMPHEDEMA: ICD-10-CM

## 2021-11-30 PROCEDURE — 99215 OFFICE O/P EST HI 40 MIN: CPT | Performed by: FAMILY MEDICINE

## 2021-11-30 RX ORDER — CEPHALEXIN 500 MG/1
500 CAPSULE ORAL 3 TIMES DAILY
Qty: 30 CAPSULE | Refills: 0 | Status: SHIPPED | OUTPATIENT
Start: 2021-11-30 | End: 2021-12-10

## 2021-11-30 NOTE — PROGRESS NOTES
Patient presents with:  Wound Care: Patient is here for a follow up of right lower leg wound. Pt came in with no wound dressing and no compression wraps. Pt states the wraps fell off  few days after the visit.       HPI:     Patient is here for follow-up on r Tab, TAKE 1 TABLET BY MOUTH AT MIGRAINE ONSET.  MAY REPEAT IN 2 HOURS AS NEEDED, Disp: , Rfl:   •  traZODone HCl 50 MG Oral Tab, TAKE 1 TABLET BY MOUTH AT NIGHT FOR 90 DAYS, Disp: , Rfl:   •  topiramate 50 MG Oral Tab, Take 100 mg by mouth 2 (two) times julia asymmetry, normal excursion. GI:  bowel sound positive in all four quadrants, abdomen is soft, non-tender, non-distended, no hepatosplenomegaly, no abnormal aortic pulsation. Calf     Point of Measurement - Right Calf: 34           Right Calf cm[de-identified] 65. Hydrofera transfer     - Dressing:    Kerramax/Super absorbent   11/11/21 1133 WOUND COMPRESSION THERAPY Routine Completed Jose Padron MD     - Type:    Comprilan     - Location:    Right leg   11/04/21 1344 OP WOUND DRESSING Routine Completed Cassidy included:  preparing to see the patient (eg, review notes and recent diagnostics), seeing the patient, performing a medically appropriate examination and/or evaluation, counseling and educating the patient, and documenting in the record.     I agree with st

## 2021-11-30 NOTE — PATIENT INSTRUCTIONS
PATIENT INSTRUCTIONS      FOR GRICELDA Villarreal 10/10/1975    DATE OF SERVICE: 2021        Clobetasol cream to the wound and red areas.      Silver Alginate and Kerramax to the wound base    Comprilan compression wrap to right lower extremit

## 2021-11-30 NOTE — PROGRESS NOTES
.Weekly Wound Education Note    Teaching Provided To: Patient  Training Topics: Discharge instructions;Dressing;Edema control;Cleasing and general instructions; Compression  Training Method: Explain/Verbal;Written  Training Response: Patient responds and un

## 2021-12-02 ENCOUNTER — OFFICE VISIT (OUTPATIENT)
Dept: WOUND CARE | Facility: HOSPITAL | Age: 46
End: 2021-12-02
Attending: FAMILY MEDICINE
Payer: COMMERCIAL

## 2021-12-02 VITALS
DIASTOLIC BLOOD PRESSURE: 80 MMHG | RESPIRATION RATE: 16 BRPM | TEMPERATURE: 98 F | SYSTOLIC BLOOD PRESSURE: 131 MMHG | HEART RATE: 84 BPM

## 2021-12-02 DIAGNOSIS — S81.801D OPEN WOUND OF RIGHT LOWER LEG, SUBSEQUENT ENCOUNTER: Primary | ICD-10-CM

## 2021-12-02 DIAGNOSIS — L08.9 INFECTED WOUND: ICD-10-CM

## 2021-12-02 DIAGNOSIS — E66.01 MORBID OBESITY (HCC): ICD-10-CM

## 2021-12-02 DIAGNOSIS — T14.8XXA INFECTED WOUND: ICD-10-CM

## 2021-12-02 DIAGNOSIS — I89.0 CHRONIC ACQUIRED LYMPHEDEMA: ICD-10-CM

## 2021-12-02 PROCEDURE — 99215 OFFICE O/P EST HI 40 MIN: CPT | Performed by: FAMILY MEDICINE

## 2021-12-02 NOTE — PROGRESS NOTES
Patient presents with:  Wound Care: Patients is here for a follow up. Patients complain itchy on the leg       HPI:     Patient is here for follow-up of infected leg wound. She is doing significantly better. She is currently on Keflex.   Most of the wound 90 DAYS, Disp: , Rfl:   •  topiramate 50 MG Oral Tab, Take 100 mg by mouth 2 (two) times daily. , Disp: , Rfl:   •  ARIPiprazole 20 MG Oral Tab, Take 10 mg by mouth daily.   , Disp: , Rfl:   •  TiZANidine HCl 4 MG Oral Tab, Take 4 mg by mouth nightly., Disp: pulsation.      Calf     Point of Measurement - Right Calf: 34        Right Calf from[de-identified] Heel  Right Calf cm[de-identified] 64.5    Ankle     Point of Measurement - Right Ankle: 10           Right Ankle from[de-identified] Heel  Right Ankle cm[de-identified] 34       Foot Location:    Right leg   11/11/21 1133 OP WOUND DRESSING Routine Completed Albaro Falcon MD     - Dressing:    Hydrofera transfer     - Dressing:    Kerramax/Super absorbent   11/11/21 1133 WOUND COMPRESSION THERAPY Routine Completed Albaro Falcon MD (around 12/7/2021) for follow up. I spent 40 minutes with the patient.  This time included:  preparing to see the patient (eg, review notes and recent diagnostics), seeing the patient, performing a medically appropriate examination and/or evaluation, cou

## 2021-12-02 NOTE — PATIENT INSTRUCTIONS
PATIENT INSTRUCTIONS      FOR GRICELDA Villarreal 10/10/1975    DATE OF SERVICE: 2021        Clobetasol cream to the wound and red areas.      Hydrofera Transfer and Kerramax to the wound base    Comprilan compression wrap to right lower extrem

## 2021-12-03 ENCOUNTER — TELEPHONE (OUTPATIENT)
Dept: WOUND CARE | Facility: HOSPITAL | Age: 46
End: 2021-12-03

## 2021-12-03 NOTE — TELEPHONE ENCOUNTER
Received VM from patient stating that her compression wraps slid down about an inch and wants to know what to do if they continue to slide down. Called patient back and spoke with with that if the wraps continue to slide down, to take them completely off.

## 2021-12-07 ENCOUNTER — APPOINTMENT (OUTPATIENT)
Dept: WOUND CARE | Facility: HOSPITAL | Age: 46
End: 2021-12-07
Attending: FAMILY MEDICINE
Payer: COMMERCIAL

## 2021-12-09 ENCOUNTER — TELEPHONE (OUTPATIENT)
Dept: RHEUMATOLOGY | Facility: CLINIC | Age: 46
End: 2021-12-09

## 2021-12-09 NOTE — TELEPHONE ENCOUNTER
Pt reported fibromyalgia flare up all over. She was not able to work Monday, Tuesday, or today due to flare up. Requesting something to lessen her flare up. Please advise. Medications reviewed at visit summary, she is no longer taking Abilify.            AS MD  7/15/2021   2:34 PM  - [place]ed [place]  through [address]

## 2021-12-09 NOTE — TELEPHONE ENCOUNTER
Talked to pt. Asked her to take an extra dose - of gabapentin 600mg a day - this will be 600mg four pills daily - she states she has enough for now. Her back really hurst and all over pain as well.    She declines medrol sia for her back pain at this ti

## 2021-12-14 ENCOUNTER — APPOINTMENT (OUTPATIENT)
Dept: WOUND CARE | Facility: HOSPITAL | Age: 46
End: 2021-12-14
Attending: FAMILY MEDICINE
Payer: COMMERCIAL

## 2021-12-31 ENCOUNTER — OFFICE VISIT (OUTPATIENT)
Dept: RHEUMATOLOGY | Facility: CLINIC | Age: 46
End: 2021-12-31
Payer: COMMERCIAL

## 2021-12-31 VITALS
HEART RATE: 89 BPM | DIASTOLIC BLOOD PRESSURE: 79 MMHG | BODY MASS INDEX: 57 KG/M2 | WEIGHT: 293 LBS | SYSTOLIC BLOOD PRESSURE: 129 MMHG

## 2021-12-31 DIAGNOSIS — M19.90 OSTEOARTHRITIS, UNSPECIFIED OSTEOARTHRITIS TYPE, UNSPECIFIED SITE: ICD-10-CM

## 2021-12-31 DIAGNOSIS — M79.7 FIBROMYALGIA: Primary | ICD-10-CM

## 2021-12-31 PROCEDURE — 3078F DIAST BP <80 MM HG: CPT | Performed by: INTERNAL MEDICINE

## 2021-12-31 PROCEDURE — 96372 THER/PROPH/DIAG INJ SC/IM: CPT | Performed by: INTERNAL MEDICINE

## 2021-12-31 PROCEDURE — 3074F SYST BP LT 130 MM HG: CPT | Performed by: INTERNAL MEDICINE

## 2021-12-31 PROCEDURE — 99214 OFFICE O/P EST MOD 30 MIN: CPT | Performed by: INTERNAL MEDICINE

## 2021-12-31 RX ORDER — KETOROLAC TROMETHAMINE 30 MG/ML
30 INJECTION, SOLUTION INTRAMUSCULAR; INTRAVENOUS ONCE
Status: COMPLETED | OUTPATIENT
Start: 2021-12-31 | End: 2021-12-31

## 2021-12-31 RX ORDER — DULOXETIN HYDROCHLORIDE 30 MG/1
30 CAPSULE, DELAYED RELEASE ORAL 2 TIMES DAILY
Qty: 180 CAPSULE | Refills: 1 | Status: SHIPPED | OUTPATIENT
Start: 2021-12-31

## 2021-12-31 RX ORDER — LORATADINE 10 MG/1
10 TABLET ORAL DAILY
COMMUNITY

## 2021-12-31 RX ADMIN — KETOROLAC TROMETHAMINE 30 MG: 30 INJECTION, SOLUTION INTRAMUSCULAR; INTRAVENOUS at 09:21:00

## 2021-12-31 NOTE — PATIENT INSTRUCTIONS
1. increase. duloxetine to 30mg twice a day -   2. Cont. gabapentin back to  600mg four times a day   3..7. Cont. Lamotrigine And trazadone -   4. Tramadol 50mg twice ad ay - monitor while taking duloxetine   5. Return to clinic in 6 months.    6. Arcadio chi

## 2021-12-31 NOTE — PROGRESS NOTES
Magdalena Brody is a 55year old female who presents for Patient presents with:  Fibromyalgia Syndrome  Pain  . HPI:     I had the pleasure of seeing Magdalena Brody on 4/23/2021 for evaluation.      She is a pleasant 39year old who has fibromyalgia switching off the gabapentin to lyrica but her insurance doesn'tt cover it completely   She was also told that cymbalta and savella. 7/15/2021  She has about 3/10 pain. She is feelign like the dulxoetine is working for her. No side effects.    She h daily.     • TiZANidine HCl 4 MG Oral Tab Take 4 mg by mouth nightly. • TraMADol HCl 50 MG Oral Tab Take 100 mg by mouth 2 (two) times daily. • acetaminophen 500 MG Oral Tab Take 1,000 mg by mouth every 6 (six) hours as needed for Pain.  (Patient no Pacemaker Maternal Grandfather    • Cancer Paternal Grandmother         stage IV, unclear primary    • Cancer Father         lung cancer, smoker   • Prostate Cancer Maternal Grandfather    grandmother had rheumatoid arthritis - maternal  Has 1 living siste Tender in neck - paracervical raea    tender in shoulders   Tender in elbows   Mild tender in left 4th pip, and other pip   Tender in right 1-5th pips- of hands - mild fulllness of right 3rd mcp   Not tender in wrists    tender in lwoer back    tender in Total      22.0 - 29.0 mmol/L 22.1 20.8 (L)   BUN      6.0 - 20.0 mg/dL 14.0 11.0   CREATININE      0.50 - 0.90 mg/dL 0.59 0.67   BUN/CREAT Ratio      10.0 - 20.0 24.0 (H) 16.0   Glucose      74 - 109 mg/dL 100 87   CALCIUM      8.6 - 10.3 mg/dL 9.2 9.1 0.0 - 6.9 U/mL 0.6   RHEUMATOID FACTOR      <15 IU/mL <10   DAREK SCREEN WITH REFLEX (S)      Negative Negative   TSH      0.358 - 3.740 mIU/mL 0.955     Component      Latest Ref Rng & Units 10/15/2021   WBC      4.0 - 11.0 x10(3) uL 8.1   RBC      3.80 - 5 subcutaneous edema.      4/23/2021 - right knee xray   1. Mild osteoarthritis of medial and lateral compartment, and more pronounced osteoarthritis in patellofemoral compartment. 2. Probable subcutaneous edema.     ASSESSMENT AND PLAN:   Isaiah Guzman MD  12/31/2021   9:00 AM  - [place]  through [facility]

## 2022-01-03 ENCOUNTER — TELEPHONE (OUTPATIENT)
Dept: RHEUMATOLOGY | Facility: CLINIC | Age: 47
End: 2022-01-03

## 2022-01-05 ENCOUNTER — OFFICE VISIT (OUTPATIENT)
Dept: ORTHOPEDICS CLINIC | Facility: CLINIC | Age: 47
End: 2022-01-05
Payer: COMMERCIAL

## 2022-01-05 DIAGNOSIS — Z98.890 S/P ARTHROSCOPY OF RIGHT SHOULDER: Primary | ICD-10-CM

## 2022-01-05 PROCEDURE — 99213 OFFICE O/P EST LOW 20 MIN: CPT | Performed by: ORTHOPAEDIC SURGERY

## 2022-01-06 NOTE — PROGRESS NOTES
[facility]  [address] [place] [address]     Name: Angeline Gomez   MRN: [id number]  Date: 1/5/2022     REASON FOR VISIT: Post-Surgical Visit  Date of Surgery: 9/20/2021– right to 90, internal rotation to the thoracolumbar junction, external rotation to 60 degrees, excellent 5 out of 5 strength in all rotator cuff muscles. Negative Maspeth's test.  Negative posterior load-and-shift. Negative Mariia and glynn.     Well-healed surgica

## 2022-01-15 ENCOUNTER — HOSPITAL ENCOUNTER (OUTPATIENT)
Age: 47
Discharge: HOME | End: 2022-01-15
Payer: COMMERCIAL

## 2022-01-15 VITALS
HEART RATE: 80 BPM | RESPIRATION RATE: 20 BRPM | TEMPERATURE: 98 F | BODY MASS INDEX: 47.09 KG/M2 | WEIGHT: 293 LBS | HEIGHT: 66 IN | OXYGEN SATURATION: 98 % | DIASTOLIC BLOOD PRESSURE: 74 MMHG | SYSTOLIC BLOOD PRESSURE: 120 MMHG

## 2022-01-15 DIAGNOSIS — R19.7 DIARRHEA, UNSPECIFIED TYPE: Primary | ICD-10-CM

## 2022-01-15 DIAGNOSIS — R10.2 ABDOMINAL PAIN, SUPRAPUBIC: ICD-10-CM

## 2022-01-15 DIAGNOSIS — R52 GENERALIZED BODY ACHES: ICD-10-CM

## 2022-01-15 DIAGNOSIS — Z20.822 ENCOUNTER FOR LABORATORY TESTING FOR COVID-19 VIRUS: ICD-10-CM

## 2022-01-15 LAB
POCT INFLUENZA A: NEGATIVE
POCT INFLUENZA B: NEGATIVE

## 2022-01-15 PROCEDURE — 99203 OFFICE O/P NEW LOW 30 MIN: CPT | Performed by: PHYSICIAN ASSISTANT

## 2022-01-15 PROCEDURE — 87502 INFLUENZA DNA AMP PROBE: CPT | Performed by: PHYSICIAN ASSISTANT

## 2022-01-15 NOTE — ED PROVIDER NOTES
Patient Seen in: [facility] [place]    History   Patient presents with:   Body ache and/or chills    Stated Complaint: covid test pcr ok, muscle pain, diarrhea, headache X 2 DAYS    HPI    Divya Paulino is a 55year old female who presents with ch Take 10 mg by mouth daily. DULoxetine 30 MG Oral Cap DR Particles,  Take 1 capsule (30 mg total) by mouth 2 (two) times daily.    OMEPRAZOLE 40 MG Oral Capsule Delayed Release,  TAKE ONE CAPSULE BY MOUTH TWICE DAILY BEFORE MEAL   Ergocalciferol (VITAMIN D Alcohol use: Yes      Alcohol/week: 0.0 - 2.0 standard drinks    Drug use: No      Review of Systems    Positive for stated complaint: covid test pcr ok, muscle pain, diarrhea, headache X 2 DAYS  Other systems are as noted in HPI.   Constitutional and vital grossly intact. There is no obvious deformity. Neurological: Gross motor movement is intact in all 4 extremities. Patient exhibits normal speech. Skin: Skin is normal to inspection. Warm and dry. No obvious bruising. No obvious rash.         ED Cours

## 2022-01-18 LAB — SARS-COV-2 RNA RESP QL NAA+PROBE: NOT DETECTED

## 2022-07-11 RX ORDER — OXYCODONE AND ACETAMINOPHEN 10; 325 MG/1; MG/1
1 TABLET ORAL EVERY 4 HOURS PRN
COMMUNITY

## 2022-07-11 RX ORDER — QUETIAPINE FUMARATE 25 MG/1
25 TABLET, FILM COATED ORAL NIGHTLY
COMMUNITY

## 2022-07-11 RX ORDER — MULTIVIT-MIN/IRON FUM/FOLIC AC 7.5 MG-4
1 TABLET ORAL DAILY
COMMUNITY

## 2022-07-12 ENCOUNTER — LAB ENCOUNTER (OUTPATIENT)
Dept: LAB | Age: 47
End: 2022-07-12
Attending: ORTHOPAEDIC SURGERY
Payer: COMMERCIAL

## 2022-07-12 DIAGNOSIS — Z01.812 ENCOUNTER FOR PREOPERATIVE SCREENING LABORATORY TESTING FOR COVID-19 VIRUS: ICD-10-CM

## 2022-07-12 DIAGNOSIS — Z20.822 ENCOUNTER FOR PREOPERATIVE SCREENING LABORATORY TESTING FOR COVID-19 VIRUS: ICD-10-CM

## 2022-07-13 LAB — SARS-COV-2 RNA RESP QL NAA+PROBE: NOT DETECTED

## 2022-07-14 ENCOUNTER — ANESTHESIA (OUTPATIENT)
Dept: SURGERY | Facility: HOSPITAL | Age: 47
End: 2022-07-14
Payer: COMMERCIAL

## 2022-07-14 ENCOUNTER — HOSPITAL ENCOUNTER (OUTPATIENT)
Facility: HOSPITAL | Age: 47
Setting detail: HOSPITAL OUTPATIENT SURGERY
Discharge: HOME | End: 2022-07-14
Attending: ORTHOPAEDIC SURGERY | Admitting: ORTHOPAEDIC SURGERY
Payer: COMMERCIAL

## 2022-07-14 ENCOUNTER — ANESTHESIA EVENT (OUTPATIENT)
Dept: SURGERY | Facility: HOSPITAL | Age: 47
End: 2022-07-14
Payer: COMMERCIAL

## 2022-07-14 VITALS
TEMPERATURE: 98 F | OXYGEN SATURATION: 96 % | BODY MASS INDEX: 44.41 KG/M2 | HEIGHT: 68 IN | RESPIRATION RATE: 20 BRPM | DIASTOLIC BLOOD PRESSURE: 86 MMHG | SYSTOLIC BLOOD PRESSURE: 127 MMHG | HEART RATE: 79 BPM | WEIGHT: 293 LBS

## 2022-07-14 DIAGNOSIS — Z01.812 ENCOUNTER FOR PREOPERATIVE SCREENING LABORATORY TESTING FOR COVID-19 VIRUS: Primary | ICD-10-CM

## 2022-07-14 DIAGNOSIS — Z20.822 ENCOUNTER FOR PREOPERATIVE SCREENING LABORATORY TESTING FOR COVID-19 VIRUS: Primary | ICD-10-CM

## 2022-07-14 PROCEDURE — 99285 EMERGENCY DEPT VISIT HI MDM: CPT

## 2022-07-14 PROCEDURE — 76942 ECHO GUIDE FOR BIOPSY: CPT | Performed by: ANESTHESIOLOGY

## 2022-07-14 PROCEDURE — 0PTM0ZZ RESECTION OF RIGHT CARPAL, OPEN APPROACH: ICD-10-PCS | Performed by: ORTHOPAEDIC SURGERY

## 2022-07-14 PROCEDURE — 93010 ELECTROCARDIOGRAM REPORT: CPT | Performed by: NURSE PRACTITIONER

## 2022-07-14 PROCEDURE — 93005 ELECTROCARDIOGRAM TRACING: CPT

## 2022-07-14 PROCEDURE — 36415 COLL VENOUS BLD VENIPUNCTURE: CPT

## 2022-07-14 RX ORDER — SODIUM CHLORIDE, SODIUM LACTATE, POTASSIUM CHLORIDE, CALCIUM CHLORIDE 600; 310; 30; 20 MG/100ML; MG/100ML; MG/100ML; MG/100ML
INJECTION, SOLUTION INTRAVENOUS CONTINUOUS
Status: DISCONTINUED | OUTPATIENT
Start: 2022-07-14 | End: 2022-07-14

## 2022-07-14 RX ORDER — HYDROCODONE BITARTRATE AND ACETAMINOPHEN 5; 325 MG/1; MG/1
1 TABLET ORAL ONCE AS NEEDED
Status: COMPLETED | OUTPATIENT
Start: 2022-07-14 | End: 2022-07-14

## 2022-07-14 RX ORDER — HYDROCODONE BITARTRATE AND ACETAMINOPHEN 5; 325 MG/1; MG/1
1-2 TABLET ORAL EVERY 4 HOURS PRN
Qty: 20 TABLET | Refills: 0 | Status: SHIPPED | OUTPATIENT
Start: 2022-07-14 | End: 2022-07-24

## 2022-07-14 RX ORDER — ONDANSETRON 2 MG/ML
INJECTION INTRAMUSCULAR; INTRAVENOUS AS NEEDED
Status: DISCONTINUED | OUTPATIENT
Start: 2022-07-14 | End: 2022-07-14 | Stop reason: SURG

## 2022-07-14 RX ORDER — HYDROMORPHONE HYDROCHLORIDE 1 MG/ML
0.4 INJECTION, SOLUTION INTRAMUSCULAR; INTRAVENOUS; SUBCUTANEOUS EVERY 5 MIN PRN
Status: DISCONTINUED | OUTPATIENT
Start: 2022-07-14 | End: 2022-07-14

## 2022-07-14 RX ORDER — MIDAZOLAM HYDROCHLORIDE 1 MG/ML
INJECTION INTRAMUSCULAR; INTRAVENOUS AS NEEDED
Status: DISCONTINUED | OUTPATIENT
Start: 2022-07-14 | End: 2022-07-14 | Stop reason: SURG

## 2022-07-14 RX ORDER — HYDROCODONE BITARTRATE AND ACETAMINOPHEN 10; 325 MG/1; MG/1
1 TABLET ORAL ONCE AS NEEDED
Status: DISCONTINUED | OUTPATIENT
Start: 2022-07-14 | End: 2022-07-14

## 2022-07-14 RX ORDER — ROCURONIUM BROMIDE 10 MG/ML
INJECTION, SOLUTION INTRAVENOUS AS NEEDED
Status: DISCONTINUED | OUTPATIENT
Start: 2022-07-14 | End: 2022-07-14 | Stop reason: SURG

## 2022-07-14 RX ORDER — GLYCOPYRROLATE 0.2 MG/ML
INJECTION, SOLUTION INTRAMUSCULAR; INTRAVENOUS AS NEEDED
Status: DISCONTINUED | OUTPATIENT
Start: 2022-07-14 | End: 2022-07-14 | Stop reason: SURG

## 2022-07-14 RX ORDER — ACETAMINOPHEN 500 MG
1000 TABLET ORAL ONCE AS NEEDED
Status: COMPLETED | OUTPATIENT
Start: 2022-07-14 | End: 2022-07-14

## 2022-07-14 RX ORDER — CEFAZOLIN SODIUM IN 0.9 % NACL 3 G/100 ML
3 INTRAVENOUS SOLUTION, PIGGYBACK (ML) INTRAVENOUS ONCE
Status: DISCONTINUED | OUTPATIENT
Start: 2022-07-14 | End: 2022-07-14 | Stop reason: HOSPADM

## 2022-07-14 RX ORDER — DEXAMETHASONE SODIUM PHOSPHATE 4 MG/ML
VIAL (ML) INJECTION AS NEEDED
Status: DISCONTINUED | OUTPATIENT
Start: 2022-07-14 | End: 2022-07-14 | Stop reason: SURG

## 2022-07-14 RX ORDER — NALOXONE HYDROCHLORIDE 0.4 MG/ML
80 INJECTION, SOLUTION INTRAMUSCULAR; INTRAVENOUS; SUBCUTANEOUS AS NEEDED
Status: DISCONTINUED | OUTPATIENT
Start: 2022-07-14 | End: 2022-07-14

## 2022-07-14 RX ORDER — SCOLOPAMINE TRANSDERMAL SYSTEM 1 MG/1
1 PATCH, EXTENDED RELEASE TRANSDERMAL ONCE
Status: DISCONTINUED | OUTPATIENT
Start: 2022-07-14 | End: 2022-07-14

## 2022-07-14 RX ORDER — PROCHLORPERAZINE EDISYLATE 5 MG/ML
5 INJECTION INTRAMUSCULAR; INTRAVENOUS EVERY 8 HOURS PRN
Status: DISCONTINUED | OUTPATIENT
Start: 2022-07-14 | End: 2022-07-14

## 2022-07-14 RX ORDER — NEOSTIGMINE METHYLSULFATE 1 MG/ML
INJECTION, SOLUTION INTRAVENOUS AS NEEDED
Status: DISCONTINUED | OUTPATIENT
Start: 2022-07-14 | End: 2022-07-14 | Stop reason: SURG

## 2022-07-14 RX ORDER — HYDROCODONE BITARTRATE AND ACETAMINOPHEN 5; 325 MG/1; MG/1
2 TABLET ORAL ONCE AS NEEDED
Status: COMPLETED | OUTPATIENT
Start: 2022-07-14 | End: 2022-07-14

## 2022-07-14 RX ORDER — LIDOCAINE HYDROCHLORIDE 10 MG/ML
INJECTION, SOLUTION EPIDURAL; INFILTRATION; INTRACAUDAL; PERINEURAL AS NEEDED
Status: DISCONTINUED | OUTPATIENT
Start: 2022-07-14 | End: 2022-07-14 | Stop reason: SURG

## 2022-07-14 RX ORDER — ACETAMINOPHEN 500 MG
1000 TABLET ORAL ONCE AS NEEDED
Status: DISCONTINUED | OUTPATIENT
Start: 2022-07-14 | End: 2022-07-14

## 2022-07-14 RX ORDER — ROPIVACAINE HYDROCHLORIDE 5 MG/ML
INJECTION, SOLUTION EPIDURAL; INFILTRATION; PERINEURAL AS NEEDED
Status: DISCONTINUED | OUTPATIENT
Start: 2022-07-14 | End: 2022-07-14 | Stop reason: SURG

## 2022-07-14 RX ORDER — HYDROCODONE BITARTRATE AND ACETAMINOPHEN 10; 325 MG/1; MG/1
2 TABLET ORAL ONCE AS NEEDED
Status: DISCONTINUED | OUTPATIENT
Start: 2022-07-14 | End: 2022-07-14

## 2022-07-14 RX ORDER — ACETAMINOPHEN 500 MG
1000 TABLET ORAL ONCE
Status: DISCONTINUED | OUTPATIENT
Start: 2022-07-14 | End: 2022-07-14 | Stop reason: HOSPADM

## 2022-07-14 RX ORDER — ONDANSETRON 2 MG/ML
4 INJECTION INTRAMUSCULAR; INTRAVENOUS EVERY 6 HOURS PRN
Status: DISCONTINUED | OUTPATIENT
Start: 2022-07-14 | End: 2022-07-14

## 2022-07-14 RX ORDER — HYDROMORPHONE HYDROCHLORIDE 1 MG/ML
0.6 INJECTION, SOLUTION INTRAMUSCULAR; INTRAVENOUS; SUBCUTANEOUS EVERY 5 MIN PRN
Status: DISCONTINUED | OUTPATIENT
Start: 2022-07-14 | End: 2022-07-14

## 2022-07-14 RX ORDER — HYDROMORPHONE HYDROCHLORIDE 1 MG/ML
0.2 INJECTION, SOLUTION INTRAMUSCULAR; INTRAVENOUS; SUBCUTANEOUS EVERY 5 MIN PRN
Status: DISCONTINUED | OUTPATIENT
Start: 2022-07-14 | End: 2022-07-14

## 2022-07-14 RX ADMIN — MIDAZOLAM HYDROCHLORIDE 2 MG: 1 INJECTION INTRAMUSCULAR; INTRAVENOUS at 15:22:00

## 2022-07-14 RX ADMIN — ROPIVACAINE HYDROCHLORIDE 30 ML: 5 INJECTION, SOLUTION EPIDURAL; INFILTRATION; PERINEURAL at 15:28:00

## 2022-07-14 RX ADMIN — ONDANSETRON 4 MG: 2 INJECTION INTRAMUSCULAR; INTRAVENOUS at 16:54:00

## 2022-07-14 RX ADMIN — GLYCOPYRROLATE 0.4 MG: 0.2 INJECTION, SOLUTION INTRAMUSCULAR; INTRAVENOUS at 17:16:00

## 2022-07-14 RX ADMIN — SODIUM CHLORIDE, SODIUM LACTATE, POTASSIUM CHLORIDE, CALCIUM CHLORIDE: 600; 310; 30; 20 INJECTION, SOLUTION INTRAVENOUS at 17:34:00

## 2022-07-14 RX ADMIN — NEOSTIGMINE METHYLSULFATE 2 MG: 1 INJECTION, SOLUTION INTRAVENOUS at 17:16:00

## 2022-07-14 RX ADMIN — DEXAMETHASONE SODIUM PHOSPHATE 4 MG: 4 MG/ML VIAL (ML) INJECTION at 15:35:00

## 2022-07-14 RX ADMIN — MIDAZOLAM HYDROCHLORIDE 2 MG: 1 INJECTION INTRAMUSCULAR; INTRAVENOUS at 15:23:00

## 2022-07-14 RX ADMIN — LIDOCAINE HYDROCHLORIDE 50 MG: 10 INJECTION, SOLUTION EPIDURAL; INFILTRATION; INTRACAUDAL; PERINEURAL at 15:30:00

## 2022-07-14 RX ADMIN — ROCURONIUM BROMIDE 10 MG: 10 INJECTION, SOLUTION INTRAVENOUS at 15:30:00

## 2022-07-14 RX ADMIN — SODIUM CHLORIDE, SODIUM LACTATE, POTASSIUM CHLORIDE, CALCIUM CHLORIDE: 600; 310; 30; 20 INJECTION, SOLUTION INTRAVENOUS at 15:17:00

## 2022-07-14 NOTE — INTERVAL H&P NOTE
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 I discussed with the patient and/or legal representative the potential benefits, risks and side effects of this procedure; the likelihood of the patient achieving goals; and potential problems that might occur during recuperation. I discussed reasonable alternatives to the procedure, including risks, benefits and side effects related to the alternatives and risks related to not receiving this procedure. We will proceed with procedure as planned.

## 2022-07-14 NOTE — ANESTHESIA PROCEDURE NOTES
Regional Block  Performed by: Mac Barth CRNA  Authorized by: Adrienne Lyn MD       General Information and Staff    Start Time:  7/14/2022 3:25 PM  End Time:  7/14/2022 3:29 PM  Anesthesiologist:  Adrienne Lyn MD  CRNA:  Mac Barth CRNA  Performed by: Anesthesiologist  Patient Location:  OR    Block Placement: Pre Induction  Site Identification: real time ultrasound guided and image stored and retrievable    Block site/laterality marked before start: site marked  Reason for Block: at surgeon's request and post-op pain management    Preanesthetic Checklist: 2 patient identifers, IV checked, site marked, risks and benefits discussed, monitors and equipment checked, pre-op evaluation, timeout performed, anesthesia consent, sterile technique used, no prohibitive neurological deficits and no local skin infection at insertion site      Procedure Details    Patient Position:  Supine  Prep: ChloraPrep    Monitoring:  Cardiac monitor, continuous pulse ox and blood pressure cuff  Block Type:  Supraclavicular  Laterality:  Right  Injection Technique:  Single-shot    Needle    Needle Type:  Short-bevel and echogenic  Needle Gauge:  21 G  Needle Length:  100 mm  Needle Localization:  Ultrasound guidance  Reason for Ultrasound Use: appropriate spread of the medication was noted in real time and no ultrasound evidence of intravascular and/or intraneural injection            Assessment    Injection Assessment:  Good spread noted, negative resistance, negative aspiration for heme, incremental injection, low pressure, local visualized surrounding nerve on ultrasound and no pain on injection  Heart Rate Change: No    - Patient tolerated block procedure well without evidence of immediate block related complications.      Medications      Additional Comments    Medication:  30 ml Ropivicaine 0.5% with 1:200,000 epi administered

## 2022-07-14 NOTE — ANESTHESIA PROCEDURE NOTES
Airway  Date/Time: 7/14/2022 3:32 PM  Urgency: elective    Airway not difficult    General Information and Staff    Patient location during procedure: OR  Anesthesiologist: Yuni Perez MD  Resident/CRNA: Adrian Garland CRNA  Performed: CRNA     Indications and Patient Condition  Indications for airway management: anesthesia  Spontaneous Ventilation: absent  Sedation level: deep  Preoxygenated: yes  Patient position: sniffing  Mask difficulty assessment: 2 - vent by mask + OA or adjuvant +/- NMBA    Final Airway Details  Final airway type: endotracheal airway      Successful airway: ETT  Cuffed: yes   Successful intubation technique: direct laryngoscopy  Endotracheal tube insertion site: oral  Blade: Tamra  Blade size: #3  ETT size (mm): 7.0    Cormack-Lehane Classification: grade I - full view of glottis  Placement verified by: chest auscultation and capnometry   Measured from: lips  ETT to lips (cm): 21  Number of attempts at approach: 1

## 2022-07-14 NOTE — BRIEF OP NOTE
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

## 2022-07-15 ENCOUNTER — APPOINTMENT (OUTPATIENT)
Dept: ULTRASOUND IMAGING | Facility: HOSPITAL | Age: 47
End: 2022-07-15
Attending: INTERNAL MEDICINE
Payer: COMMERCIAL

## 2022-07-15 ENCOUNTER — APPOINTMENT (OUTPATIENT)
Dept: GENERAL RADIOLOGY | Facility: HOSPITAL | Age: 47
End: 2022-07-15
Attending: NURSE PRACTITIONER
Payer: COMMERCIAL

## 2022-07-15 ENCOUNTER — APPOINTMENT (OUTPATIENT)
Dept: CT IMAGING | Facility: HOSPITAL | Age: 47
End: 2022-07-15
Attending: NURSE PRACTITIONER
Payer: COMMERCIAL

## 2022-07-15 ENCOUNTER — HOSPITAL ENCOUNTER (OUTPATIENT)
Facility: HOSPITAL | Age: 47
Setting detail: OBSERVATION
Discharge: HOME | End: 2022-07-15
Attending: INTERNAL MEDICINE | Admitting: INTERNAL MEDICINE
Payer: COMMERCIAL

## 2022-07-15 VITALS
HEART RATE: 87 BPM | DIASTOLIC BLOOD PRESSURE: 81 MMHG | TEMPERATURE: 98 F | RESPIRATION RATE: 18 BRPM | SYSTOLIC BLOOD PRESSURE: 122 MMHG | OXYGEN SATURATION: 95 %

## 2022-07-15 DIAGNOSIS — R06.00 DYSPNEA, UNSPECIFIED TYPE: ICD-10-CM

## 2022-07-15 DIAGNOSIS — R09.02 HYPOXIA: Primary | ICD-10-CM

## 2022-07-15 LAB
ANION GAP SERPL CALC-SCNC: 8 MMOL/L (ref 0–18)
BASOPHILS # BLD AUTO: 0.03 X10(3) UL (ref 0–0.2)
BASOPHILS NFR BLD AUTO: 0.3 %
BUN BLD-MCNC: 8 MG/DL (ref 7–18)
BUN/CREAT SERPL: 9.6 (ref 10–20)
CALCIUM BLD-MCNC: 9.1 MG/DL (ref 8.5–10.1)
CHLORIDE SERPL-SCNC: 106 MMOL/L (ref 98–112)
CO2 SERPL-SCNC: 25 MMOL/L (ref 21–32)
CREAT BLD-MCNC: 0.83 MG/DL
D DIMER PPP FEU-MCNC: 0.72 UG/ML FEU (ref ?–0.5)
DEPRECATED RDW RBC AUTO: 44.1 FL (ref 35.1–46.3)
EOSINOPHIL # BLD AUTO: 0.01 X10(3) UL (ref 0–0.7)
EOSINOPHIL NFR BLD AUTO: 0.1 %
ERYTHROCYTE [DISTWIDTH] IN BLOOD BY AUTOMATED COUNT: 13.7 % (ref 11–15)
GLUCOSE BLD-MCNC: 136 MG/DL (ref 70–99)
HCT VFR BLD AUTO: 43.3 %
HGB BLD-MCNC: 13.5 G/DL
IMM GRANULOCYTES # BLD AUTO: 0.04 X10(3) UL (ref 0–1)
IMM GRANULOCYTES NFR BLD: 0.4 %
LYMPHOCYTES # BLD AUTO: 0.87 X10(3) UL (ref 1–4)
LYMPHOCYTES NFR BLD AUTO: 9.1 %
MCH RBC QN AUTO: 27.5 PG (ref 26–34)
MCHC RBC AUTO-ENTMCNC: 31.2 G/DL (ref 31–37)
MCV RBC AUTO: 88.2 FL
MONOCYTES # BLD AUTO: 0.36 X10(3) UL (ref 0.1–1)
MONOCYTES NFR BLD AUTO: 3.8 %
NEUTROPHILS # BLD AUTO: 8.27 X10 (3) UL (ref 1.5–7.7)
NEUTROPHILS # BLD AUTO: 8.27 X10(3) UL (ref 1.5–7.7)
NEUTROPHILS NFR BLD AUTO: 86.3 %
OSMOLALITY SERPL CALC.SUM OF ELEC: 288 MOSM/KG (ref 275–295)
PLATELET # BLD AUTO: 293 10(3)UL (ref 150–450)
POTASSIUM SERPL-SCNC: 4.7 MMOL/L (ref 3.5–5.1)
RBC # BLD AUTO: 4.91 X10(6)UL
SARS-COV-2 RNA RESP QL NAA+PROBE: NOT DETECTED
SODIUM SERPL-SCNC: 139 MMOL/L (ref 136–145)
TROPONIN I HIGH SENSITIVITY: 4 NG/L
WBC # BLD AUTO: 9.6 X10(3) UL (ref 4–11)

## 2022-07-15 PROCEDURE — 85025 COMPLETE CBC W/AUTO DIFF WBC: CPT | Performed by: NURSE PRACTITIONER

## 2022-07-15 PROCEDURE — 80048 BASIC METABOLIC PNL TOTAL CA: CPT | Performed by: NURSE PRACTITIONER

## 2022-07-15 PROCEDURE — 71260 CT THORAX DX C+: CPT | Performed by: NURSE PRACTITIONER

## 2022-07-15 PROCEDURE — 85379 FIBRIN DEGRADATION QUANT: CPT | Performed by: NURSE PRACTITIONER

## 2022-07-15 PROCEDURE — 93970 EXTREMITY STUDY: CPT | Performed by: INTERNAL MEDICINE

## 2022-07-15 PROCEDURE — 93971 EXTREMITY STUDY: CPT | Performed by: INTERNAL MEDICINE

## 2022-07-15 PROCEDURE — 84484 ASSAY OF TROPONIN QUANT: CPT | Performed by: NURSE PRACTITIONER

## 2022-07-15 PROCEDURE — 71045 X-RAY EXAM CHEST 1 VIEW: CPT | Performed by: NURSE PRACTITIONER

## 2022-07-15 RX ORDER — QUETIAPINE FUMARATE 25 MG/1
25 TABLET, FILM COATED ORAL NIGHTLY
Status: DISCONTINUED | OUTPATIENT
Start: 2022-07-15 | End: 2022-07-15

## 2022-07-15 RX ORDER — DULOXETIN HYDROCHLORIDE 30 MG/1
30 CAPSULE, DELAYED RELEASE ORAL 2 TIMES DAILY
Status: DISCONTINUED | OUTPATIENT
Start: 2022-07-15 | End: 2022-07-15

## 2022-07-15 RX ORDER — OXYCODONE AND ACETAMINOPHEN 10; 325 MG/1; MG/1
1 TABLET ORAL EVERY 4 HOURS PRN
Status: DISCONTINUED | OUTPATIENT
Start: 2022-07-15 | End: 2022-07-15

## 2022-07-15 RX ORDER — MULTIPLE VITAMINS W/ MINERALS TAB 9MG-400MCG
1 TAB ORAL DAILY
Status: DISCONTINUED | OUTPATIENT
Start: 2022-07-15 | End: 2022-07-15

## 2022-07-15 RX ORDER — CELECOXIB 200 MG/1
200 CAPSULE ORAL DAILY PRN
Status: DISCONTINUED | OUTPATIENT
Start: 2022-07-15 | End: 2022-07-15

## 2022-07-15 RX ORDER — CETIRIZINE HYDROCHLORIDE 1 MG/ML
10 SOLUTION ORAL DAILY
Status: DISCONTINUED | OUTPATIENT
Start: 2022-07-15 | End: 2022-07-15

## 2022-07-15 RX ORDER — GABAPENTIN 600 MG/1
600 TABLET ORAL 3 TIMES DAILY
Status: DISCONTINUED | OUTPATIENT
Start: 2022-07-15 | End: 2022-07-15

## 2022-07-15 RX ORDER — ACETAMINOPHEN 500 MG
500 TABLET ORAL EVERY 4 HOURS PRN
Status: DISCONTINUED | OUTPATIENT
Start: 2022-07-15 | End: 2022-07-15

## 2022-07-15 RX ORDER — GABAPENTIN 600 MG/1
600 TABLET ORAL 3 TIMES DAILY
COMMUNITY

## 2022-07-15 RX ORDER — PANTOPRAZOLE SODIUM 40 MG/1
40 TABLET, DELAYED RELEASE ORAL
Refills: 0 | Status: DISCONTINUED | OUTPATIENT
Start: 2022-07-15 | End: 2022-07-15

## 2022-07-15 RX ORDER — HYDROCODONE BITARTRATE AND ACETAMINOPHEN 5; 325 MG/1; MG/1
1-2 TABLET ORAL EVERY 4 HOURS PRN
Status: DISCONTINUED | OUTPATIENT
Start: 2022-07-15 | End: 2022-07-15

## 2022-07-15 NOTE — ED QUICK NOTES
Orders for admission, patient is aware of plan and ready to go upstairs. Any questions, please call ED RN Mason Pettit  at extension [phone]. Type of COVID test sent: Rapid  COVID Suspicion level: Low    Titratable drug(s) infusing:n/a  Rate:    LOC at time of transport: a/o x4. Other pertinent information:  A/o x 4.   On 1.5L per NC     CIWA score=n/a  NIH score=n/a

## 2022-07-15 NOTE — OPERATIVE REPORT
[facility]    PATIENT'S NAME: Tena Gregorio   ATTENDING PHYSICIAN: Mary Kate Thomas M.D. OPERATING PHYSICIAN: Mary Kate Thomas M.D. PATIENT ACCOUNT#:   [de-identified]    LOCATION:  [place] [address]  MEDICAL RECORD #:   [id number]       YOB: 1975  ADMISSION DATE:       07/14/2022      OPERATION DATE:  07/14/2022    OPERATIVE REPORT    #PREOPERATIVE DIAGNOSIS:  Nonunion of the right lunotriquetral joint. POSTOPERATIVE DIAGNOSIS:  Nonunion of the right lunotriquetral joint  PROCEDURE:  Proximal row carpectomy. ANESTHESIA:  General with preoperative regional block. OPERATIVE TECHNIQUE:   After induction of anesthesia, the arm was prepped and draped in sterile fashion. Esmarch exsanguination performed. Lazy-S incision was made with transverse limb just distal to the radiocarpal joint. The retinaculum was opened over the third dorsal compartment. The retinaculum was extended ulnarly. Note, during the surgical incision of the dorsal retinaculum, an injury occurred to the index finger extensor digitorum communis. The fourth dorsal compartment was reflected ulnarly, the second dorsal compartment and third dorsal compartment, radially. A partial resection of the posterior interosseous nerve dorsally was performed. Longitudinal incision was made near the Enoch tubercle, and the capsule was reflected radially and ulnarly. K-wire was placed in the scaphoid and the scapholunate ligament was divided. Using the K-wire as a joystick, was able to help with the release of the ligaments attached to the scaphoid. Ultimately, a rongeur was needed for the distal pole. The scapholunate joint was approached. There was an obvious nonunion at this area, with motion and loosening of the what appears to be an Acumed headless screw in the triquetrum. The dorsal aspect of the bone was removed to allow me to remove the headless screw.   K-wires were then placed to help with the closure after release of the radial and ulnar lunate and triquetrum. Any bony debris was discarded. There were no loose bodies. There were some palpable small areas of bony involvement with the capsule, but no loose bodies. The wound was copiously irrigated and closed with Vicryl and nylon. Dressing was applied. Note, the extensor digitorum communis of the index finger had been repaired prior to the closure with 3-0 Ethibond and a running 6-0 Prolene to the epitenon. The flexor indicis proprius remained intact. She was splinted with the wrist in neutral position and the IP joints and MP joints in extension. This was to take off any pressure on the extensor indicis injury. Followup as needed.     Dictated By Kalli Cramer M.D.  d: 07/14/2022 22:39:46  t: 07/15/2022 04:15:23  [facility] [id number]  LUCIAN/

## 2022-07-15 NOTE — PLAN OF CARE
Problem: Patient Centered Care  Goal: Patient preferences are identified and integrated in the patient's plan of care  Description: Interventions:  - What would you like us to know as we care for you?  Home with daughter  - Provide timely, complete, and accurate information to patient/family  - Incorporate patient and family knowledge, values, beliefs, and cultural backgrounds into the planning and delivery of care  - Encourage patient/family to participate in care and decision-making at the level they choose  - Honor patient and family perspectives and choices  Outcome: Adequate for Discharge

## 2022-07-15 NOTE — ED INITIAL ASSESSMENT (HPI)
PT TO ER WITH COMPLAINTS OF SOB, STATES SYMPTOMS STARTED AN HOUR PTA. PT STATES TODAY SHE HAD A NERVE BLOCK TODAY BECAUSE THEY TOOK 3 BONES OUT OF HER WRIST. PT O2 SATS ON R/A ARE 88%. PT PLACED ON 2L NASAL CANULA IN TRIAGE.

## 2022-11-07 ENCOUNTER — HOSPITAL ENCOUNTER (EMERGENCY)
Age: 47
Discharge: LEFT BEFORE DISCHARGE | End: 2022-11-07

## 2022-11-07 VITALS
RESPIRATION RATE: 18 BRPM | SYSTOLIC BLOOD PRESSURE: 129 MMHG | BODY MASS INDEX: 44.41 KG/M2 | WEIGHT: 293 LBS | DIASTOLIC BLOOD PRESSURE: 78 MMHG | TEMPERATURE: 97.9 F | OXYGEN SATURATION: 99 % | HEART RATE: 61 BPM | HEIGHT: 68 IN

## 2022-11-07 RX ORDER — TIZANIDINE 4 MG/1
4 TABLET ORAL
COMMUNITY

## 2022-11-07 RX ORDER — LAMOTRIGINE 150 MG/1
150 TABLET ORAL DAILY
COMMUNITY

## 2022-11-07 RX ORDER — HYDROCODONE BITARTRATE AND ACETAMINOPHEN 10; 325 MG/1; MG/1
TABLET ORAL
COMMUNITY
Start: 2022-08-09

## 2022-11-07 RX ORDER — QUETIAPINE FUMARATE 25 MG/1
25 TABLET, FILM COATED ORAL NIGHTLY
COMMUNITY

## 2022-11-07 RX ORDER — GABAPENTIN 600 MG/1
TABLET ORAL
COMMUNITY
Start: 2022-08-04

## 2022-11-07 RX ORDER — TOPIRAMATE 50 MG/1
100 TABLET, FILM COATED ORAL
COMMUNITY

## 2022-11-07 RX ORDER — OMEPRAZOLE 40 MG/1
40 CAPSULE, DELAYED RELEASE ORAL
COMMUNITY
Start: 2022-06-08

## 2022-11-07 RX ORDER — LORATADINE 10 MG/1
10 TABLET ORAL DAILY
COMMUNITY

## 2022-11-07 ASSESSMENT — PAIN SCALES - GENERAL: PAINLEVEL_OUTOF10: 8

## 2022-11-07 ASSESSMENT — PAIN DESCRIPTION - PAIN TYPE: TYPE: ACUTE PAIN

## 2022-11-17 ENCOUNTER — OFFICE VISIT (OUTPATIENT)
Dept: SLEEP MEDICINE | Age: 47
End: 2022-11-17

## 2022-11-17 VITALS
HEART RATE: 76 BPM | SYSTOLIC BLOOD PRESSURE: 96 MMHG | OXYGEN SATURATION: 96 % | BODY MASS INDEX: 44.41 KG/M2 | DIASTOLIC BLOOD PRESSURE: 72 MMHG | TEMPERATURE: 97.1 F | HEIGHT: 68 IN | WEIGHT: 293 LBS

## 2022-11-17 DIAGNOSIS — M79.7 PRIMARY FIBROMYALGIA SYNDROME: ICD-10-CM

## 2022-11-17 DIAGNOSIS — G47.20 DYSFUNCTIONS OF SLEEP STAGES OR AROUSAL FROM SLEEP: ICD-10-CM

## 2022-11-17 DIAGNOSIS — E66.01 MORBID OBESITY (CMD): ICD-10-CM

## 2022-11-17 DIAGNOSIS — J45.909 UNCOMPLICATED ASTHMA, UNSPECIFIED ASTHMA SEVERITY, UNSPECIFIED WHETHER PERSISTENT: ICD-10-CM

## 2022-11-17 DIAGNOSIS — Z01.812 PRE-PROCEDURAL LABORATORY EXAMINATION: Primary | ICD-10-CM

## 2022-11-17 DIAGNOSIS — G47.33 OBSTRUCTIVE SLEEP APNEA (ADULT) (PEDIATRIC): Primary | ICD-10-CM

## 2022-11-17 DIAGNOSIS — G47.10 HYPERSOMNIA, UNSPECIFIED: ICD-10-CM

## 2022-11-17 DIAGNOSIS — R06.83 SNORING: ICD-10-CM

## 2022-11-17 PROCEDURE — 99202 OFFICE O/P NEW SF 15 MIN: CPT | Performed by: NURSE PRACTITIONER

## 2022-11-17 PROCEDURE — 99244 OFF/OP CNSLTJ NEW/EST MOD 40: CPT | Performed by: NURSE PRACTITIONER

## 2022-11-17 RX ORDER — TRAZODONE HYDROCHLORIDE 150 MG/1
150 TABLET ORAL AT BEDTIME
COMMUNITY
Start: 2022-11-02

## 2022-11-17 RX ORDER — DULOXETIN HYDROCHLORIDE 30 MG/1
30 CAPSULE, DELAYED RELEASE ORAL DAILY
COMMUNITY
Start: 2022-11-02

## 2022-11-17 RX ORDER — DICLOFENAC EPOLAMINE 0.01 G/1
SYSTEM TOPICAL
COMMUNITY

## 2022-11-17 RX ORDER — CYCLOBENZAPRINE HCL 10 MG
TABLET ORAL PRN
COMMUNITY
Start: 2022-11-10

## 2022-11-17 RX ORDER — OXYCODONE AND ACETAMINOPHEN 10; 325 MG/1; MG/1
TABLET ORAL PRN
COMMUNITY
Start: 2022-11-10

## 2022-11-17 ASSESSMENT — ENCOUNTER SYMPTOMS
PARESTHESIAS: 0
SNORING: 1
DIARRHEA: 0
CONSTIPATION: 0
FEVER: 0
BACK PAIN: 0
NUMBNESS: 0
SLEEP DISTURBANCES DUE TO BREATHING: 1
BLURRED VISION: 0
WEIGHT LOSS: 0
PHOTOPHOBIA: 0
CHILLS: 0
EXCESSIVE DAYTIME SLEEPINESS: 1
SORE THROAT: 0

## 2022-12-12 DIAGNOSIS — Z01.812 PRE-PROCEDURE LAB EXAM: Primary | ICD-10-CM

## 2022-12-14 ENCOUNTER — APPOINTMENT (OUTPATIENT)
Dept: LAB | Age: 47
End: 2022-12-14

## 2022-12-16 ENCOUNTER — APPOINTMENT (OUTPATIENT)
Dept: SLEEP MEDICINE | Age: 47
End: 2022-12-16

## 2022-12-30 ENCOUNTER — APPOINTMENT (OUTPATIENT)
Dept: SLEEP MEDICINE | Age: 47
End: 2022-12-30

## 2023-01-09 ENCOUNTER — APPOINTMENT (OUTPATIENT)
Dept: SLEEP MEDICINE | Age: 48
End: 2023-01-09
Attending: NURSE PRACTITIONER

## 2023-01-25 ENCOUNTER — HOSPITAL ENCOUNTER (EMERGENCY)
Facility: HOSPITAL | Age: 48
Discharge: HOME | End: 2023-01-25
Attending: EMERGENCY MEDICINE
Payer: COMMERCIAL

## 2023-01-25 VITALS
WEIGHT: 293 LBS | TEMPERATURE: 97 F | RESPIRATION RATE: 16 BRPM | BODY MASS INDEX: 44.41 KG/M2 | OXYGEN SATURATION: 99 % | HEIGHT: 68 IN | SYSTOLIC BLOOD PRESSURE: 128 MMHG | HEART RATE: 77 BPM | DIASTOLIC BLOOD PRESSURE: 75 MMHG

## 2023-01-25 DIAGNOSIS — R53.1 WEAKNESS GENERALIZED: Primary | ICD-10-CM

## 2023-01-25 LAB
ALBUMIN SERPL-MCNC: 3.8 G/DL (ref 3.4–5)
ALP LIVER SERPL-CCNC: 121 U/L
ALT SERPL-CCNC: 23 U/L
ANION GAP SERPL CALC-SCNC: 6 MMOL/L (ref 0–18)
AST SERPL-CCNC: 15 U/L (ref 15–37)
BASOPHILS # BLD AUTO: 0.08 X10(3) UL (ref 0–0.2)
BASOPHILS NFR BLD AUTO: 0.9 %
BILIRUB DIRECT SERPL-MCNC: <0.1 MG/DL (ref 0–0.2)
BILIRUB SERPL-MCNC: 0.3 MG/DL (ref 0.1–2)
BILIRUB UR QL: NEGATIVE
BUN BLD-MCNC: 10 MG/DL (ref 7–18)
BUN/CREAT SERPL: 13.2 (ref 10–20)
CALCIUM BLD-MCNC: 9.1 MG/DL (ref 8.5–10.1)
CHLORIDE SERPL-SCNC: 104 MMOL/L (ref 98–112)
CLARITY UR: CLEAR
CO2 SERPL-SCNC: 30 MMOL/L (ref 21–32)
COLOR UR: YELLOW
CREAT BLD-MCNC: 0.76 MG/DL
DEPRECATED RDW RBC AUTO: 43.3 FL (ref 35.1–46.3)
EOSINOPHIL # BLD AUTO: 0.33 X10(3) UL (ref 0–0.7)
EOSINOPHIL NFR BLD AUTO: 3.7 %
ERYTHROCYTE [DISTWIDTH] IN BLOOD BY AUTOMATED COUNT: 13.2 % (ref 11–15)
GFR SERPLBLD BASED ON 1.73 SQ M-ARVRAT: 97 ML/MIN/1.73M2 (ref 60–?)
GLUCOSE BLD-MCNC: 95 MG/DL (ref 70–99)
GLUCOSE UR-MCNC: NEGATIVE MG/DL
HCT VFR BLD AUTO: 42 %
HGB BLD-MCNC: 13.4 G/DL
IMM GRANULOCYTES # BLD AUTO: 0.03 X10(3) UL (ref 0–1)
IMM GRANULOCYTES NFR BLD: 0.3 %
KETONES UR-MCNC: NEGATIVE MG/DL
LEUKOCYTE ESTERASE UR QL STRIP.AUTO: NEGATIVE
LYMPHOCYTES # BLD AUTO: 2.05 X10(3) UL (ref 1–4)
LYMPHOCYTES NFR BLD AUTO: 23.1 %
MCH RBC QN AUTO: 28.2 PG (ref 26–34)
MCHC RBC AUTO-ENTMCNC: 31.9 G/DL (ref 31–37)
MCV RBC AUTO: 88.2 FL
MONOCYTES # BLD AUTO: 0.5 X10(3) UL (ref 0.1–1)
MONOCYTES NFR BLD AUTO: 5.6 %
NEUTROPHILS # BLD AUTO: 5.9 X10 (3) UL (ref 1.5–7.7)
NEUTROPHILS # BLD AUTO: 5.9 X10(3) UL (ref 1.5–7.7)
NEUTROPHILS NFR BLD AUTO: 66.4 %
NITRITE UR QL STRIP.AUTO: NEGATIVE
OSMOLALITY SERPL CALC.SUM OF ELEC: 289 MOSM/KG (ref 275–295)
PH UR: 6 [PH] (ref 5–8)
PLATELET # BLD AUTO: 315 10(3)UL (ref 150–450)
POTASSIUM SERPL-SCNC: 3.9 MMOL/L (ref 3.5–5.1)
PROT SERPL-MCNC: 7.9 G/DL (ref 6.4–8.2)
PROT UR-MCNC: NEGATIVE MG/DL
RBC # BLD AUTO: 4.76 X10(6)UL
SODIUM SERPL-SCNC: 140 MMOL/L (ref 136–145)
SP GR UR STRIP: 1.02 (ref 1–1.03)
UROBILINOGEN UR STRIP-ACNC: 0.2
WBC # BLD AUTO: 8.9 X10(3) UL (ref 4–11)

## 2023-01-25 PROCEDURE — 99284 EMERGENCY DEPT VISIT MOD MDM: CPT

## 2023-01-25 PROCEDURE — 36415 COLL VENOUS BLD VENIPUNCTURE: CPT

## 2023-01-25 PROCEDURE — 85025 COMPLETE CBC W/AUTO DIFF WBC: CPT | Performed by: EMERGENCY MEDICINE

## 2023-01-25 PROCEDURE — 80076 HEPATIC FUNCTION PANEL: CPT | Performed by: EMERGENCY MEDICINE

## 2023-01-25 PROCEDURE — 80048 BASIC METABOLIC PNL TOTAL CA: CPT | Performed by: EMERGENCY MEDICINE

## 2023-01-25 PROCEDURE — 81001 URINALYSIS AUTO W/SCOPE: CPT | Performed by: EMERGENCY MEDICINE

## 2023-01-25 PROCEDURE — 81015 MICROSCOPIC EXAM OF URINE: CPT | Performed by: EMERGENCY MEDICINE

## 2023-01-26 ENCOUNTER — APPOINTMENT (OUTPATIENT)
Dept: SLEEP MEDICINE | Age: 48
End: 2023-01-26

## 2023-01-26 NOTE — ED INITIAL ASSESSMENT (HPI)
Patient to er with \"complaints of not feeling right, notes woozy, trembling and vertigo. Patient notes symptoms began today.   Hand movements \"trembling\" over the last couple months

## 2023-01-26 NOTE — DISCHARGE INSTRUCTIONS
Drink plenty of fluids. Follow-up with your primary physician for further evaluation. Return to the emergency department if focal weakness, fever, or other new symptoms develop.

## 2023-02-23 ENCOUNTER — OFFICE VISIT (OUTPATIENT)
Dept: INTERNAL MEDICINE CLINIC | Facility: CLINIC | Age: 48
End: 2023-02-23

## 2023-02-23 VITALS
HEART RATE: 92 BPM | DIASTOLIC BLOOD PRESSURE: 77 MMHG | BODY MASS INDEX: 44.41 KG/M2 | HEIGHT: 68 IN | WEIGHT: 293 LBS | SYSTOLIC BLOOD PRESSURE: 118 MMHG

## 2023-02-23 DIAGNOSIS — M51.37 DEGENERATION OF LUMBAR OR LUMBOSACRAL INTERVERTEBRAL DISC: ICD-10-CM

## 2023-02-23 DIAGNOSIS — Z13.29 THYROID DISORDER SCREEN: ICD-10-CM

## 2023-02-23 DIAGNOSIS — I10 PRIMARY HYPERTENSION: ICD-10-CM

## 2023-02-23 DIAGNOSIS — Z13.0 SCREENING FOR DEFICIENCY ANEMIA: ICD-10-CM

## 2023-02-23 DIAGNOSIS — G47.33 OSA (OBSTRUCTIVE SLEEP APNEA): ICD-10-CM

## 2023-02-23 DIAGNOSIS — E78.5 DYSLIPIDEMIA: ICD-10-CM

## 2023-02-23 DIAGNOSIS — Z13.21 ENCOUNTER FOR VITAMIN DEFICIENCY SCREENING: ICD-10-CM

## 2023-02-23 DIAGNOSIS — I83.892 VARICOSE VEINS OF LEFT LOWER EXTREMITY WITH COMPLICATIONS: ICD-10-CM

## 2023-02-23 DIAGNOSIS — F31.9 BIPOLAR AFFECTIVE DISORDER, REMISSION STATUS UNSPECIFIED (HCC): ICD-10-CM

## 2023-02-23 DIAGNOSIS — R82.90 ABNORMAL URINALYSIS: ICD-10-CM

## 2023-02-23 DIAGNOSIS — Z00.00 WELLNESS EXAMINATION: Primary | ICD-10-CM

## 2023-02-23 DIAGNOSIS — Z12.31 ENCOUNTER FOR SCREENING MAMMOGRAM FOR MALIGNANT NEOPLASM OF BREAST: ICD-10-CM

## 2023-02-23 PROCEDURE — 3008F BODY MASS INDEX DOCD: CPT | Performed by: NURSE PRACTITIONER

## 2023-02-23 PROCEDURE — 3074F SYST BP LT 130 MM HG: CPT | Performed by: NURSE PRACTITIONER

## 2023-02-23 PROCEDURE — 3078F DIAST BP <80 MM HG: CPT | Performed by: NURSE PRACTITIONER

## 2023-02-23 PROCEDURE — 99396 PREV VISIT EST AGE 40-64: CPT | Performed by: NURSE PRACTITIONER

## 2023-02-23 RX ORDER — FUROSEMIDE 40 MG/1
TABLET ORAL
COMMUNITY
Start: 2023-02-21

## 2023-02-23 RX ORDER — OXCARBAZEPINE 600 MG/1
TABLET, FILM COATED ORAL
COMMUNITY
Start: 2023-01-29

## 2023-02-23 RX ORDER — ARIPIPRAZOLE 10 MG/1
TABLET ORAL
COMMUNITY
Start: 2023-02-15

## 2023-02-23 RX ORDER — PANCRELIPASE LIPASE, PANCRELIPASE PROTEASE, PANCRELIPASE AMYLASE 40000; 126000; 168000 [USP'U]/1; [USP'U]/1; [USP'U]/1
CAPSULE, DELAYED RELEASE ORAL
COMMUNITY

## 2023-02-23 RX ORDER — ACETAMINOPHEN AND CODEINE PHOSPHATE 300; 30 MG/1; MG/1
TABLET ORAL
COMMUNITY
Start: 2023-01-12 | End: 2023-02-23

## 2023-02-23 RX ORDER — CYCLOBENZAPRINE HCL 10 MG
TABLET ORAL
COMMUNITY
Start: 2023-02-14

## 2023-02-23 RX ORDER — BUPRENORPHINE HYDROCHLORIDE 150 UG/1
FILM, SOLUBLE BUCCAL
COMMUNITY
End: 2023-02-23

## 2023-02-23 RX ORDER — DIVALPROEX SODIUM 500 MG/1
TABLET, EXTENDED RELEASE ORAL
COMMUNITY
Start: 2023-02-08

## 2023-02-23 RX ORDER — HYDROXYZINE 50 MG/1
TABLET, FILM COATED ORAL
COMMUNITY
Start: 2023-02-07

## 2023-02-23 RX ORDER — IBUPROFEN 600 MG/1
TABLET ORAL
COMMUNITY
Start: 2023-01-12

## 2023-03-01 ENCOUNTER — OFFICE VISIT (OUTPATIENT)
Dept: PODIATRY CLINIC | Facility: CLINIC | Age: 48
End: 2023-03-01

## 2023-03-01 VITALS — SYSTOLIC BLOOD PRESSURE: 122 MMHG | DIASTOLIC BLOOD PRESSURE: 77 MMHG | HEART RATE: 84 BPM

## 2023-03-01 DIAGNOSIS — M79.671 PAIN OF RIGHT HEEL: ICD-10-CM

## 2023-03-01 DIAGNOSIS — M72.2 PLANTAR FASCIITIS OF RIGHT FOOT: Primary | ICD-10-CM

## 2023-03-01 DIAGNOSIS — M21.6X1 ACQUIRED EQUINUS DEFORMITY OF RIGHT FOOT: ICD-10-CM

## 2023-03-01 PROCEDURE — 3074F SYST BP LT 130 MM HG: CPT | Performed by: PODIATRIST

## 2023-03-01 PROCEDURE — 99213 OFFICE O/P EST LOW 20 MIN: CPT | Performed by: PODIATRIST

## 2023-03-01 PROCEDURE — 3078F DIAST BP <80 MM HG: CPT | Performed by: PODIATRIST

## 2023-03-01 PROCEDURE — 20550 NJX 1 TENDON SHEATH/LIGAMENT: CPT | Performed by: PODIATRIST

## 2023-03-01 RX ORDER — TRIAMCINOLONE ACETONIDE 40 MG/ML
40 INJECTION, SUSPENSION INTRA-ARTICULAR; INTRAMUSCULAR ONCE
Status: COMPLETED | OUTPATIENT
Start: 2023-03-01 | End: 2023-03-01

## 2023-03-01 RX ADMIN — TRIAMCINOLONE ACETONIDE 40 MG: 40 INJECTION, SUSPENSION INTRA-ARTICULAR; INTRAMUSCULAR at 14:30:00

## 2023-03-01 NOTE — PROGRESS NOTES
Per Dr. Dennis Salt request was draw syringe with 1m of Marcaine and 1 ml of Kenalog 40 for this pt Right heel.  Marshall Poll

## 2023-03-03 ENCOUNTER — OFFICE VISIT (OUTPATIENT)
Dept: NEUROLOGY | Facility: CLINIC | Age: 48
End: 2023-03-03
Payer: COMMERCIAL

## 2023-03-03 VITALS — HEIGHT: 68 IN | WEIGHT: 293 LBS | BODY MASS INDEX: 44.41 KG/M2

## 2023-03-03 DIAGNOSIS — G43.009 MIGRAINE WITHOUT AURA AND WITHOUT STATUS MIGRAINOSUS, NOT INTRACTABLE: ICD-10-CM

## 2023-03-03 DIAGNOSIS — G25.0 ESSENTIAL TREMOR: Primary | ICD-10-CM

## 2023-03-03 PROCEDURE — 3008F BODY MASS INDEX DOCD: CPT | Performed by: OTHER

## 2023-03-03 PROCEDURE — 99204 OFFICE O/P NEW MOD 45 MIN: CPT | Performed by: OTHER

## 2023-03-03 RX ORDER — PROPRANOLOL HYDROCHLORIDE 20 MG/1
TABLET ORAL
Qty: 120 TABLET | Refills: 3 | Status: SHIPPED | OUTPATIENT
Start: 2023-03-03

## 2023-03-13 ENCOUNTER — APPOINTMENT (OUTPATIENT)
Dept: GENERAL RADIOLOGY | Facility: HOSPITAL | Age: 48
End: 2023-03-13
Attending: EMERGENCY MEDICINE
Payer: COMMERCIAL

## 2023-03-13 ENCOUNTER — HOSPITAL ENCOUNTER (EMERGENCY)
Facility: HOSPITAL | Age: 48
Discharge: HOME | End: 2023-03-13
Attending: EMERGENCY MEDICINE
Payer: COMMERCIAL

## 2023-03-13 VITALS
TEMPERATURE: 97 F | OXYGEN SATURATION: 99 % | HEART RATE: 68 BPM | DIASTOLIC BLOOD PRESSURE: 81 MMHG | SYSTOLIC BLOOD PRESSURE: 114 MMHG | RESPIRATION RATE: 18 BRPM

## 2023-03-13 DIAGNOSIS — M76.51 PATELLAR TENDINITIS OF RIGHT KNEE: Primary | ICD-10-CM

## 2023-03-13 PROCEDURE — 99284 EMERGENCY DEPT VISIT MOD MDM: CPT

## 2023-03-13 PROCEDURE — 99283 EMERGENCY DEPT VISIT LOW MDM: CPT

## 2023-03-13 PROCEDURE — 73560 X-RAY EXAM OF KNEE 1 OR 2: CPT | Performed by: EMERGENCY MEDICINE

## 2023-03-13 RX ORDER — MELOXICAM 15 MG/1
15 TABLET ORAL DAILY
Qty: 15 TABLET | Refills: 0 | Status: SHIPPED | OUTPATIENT
Start: 2023-03-13 | End: 2023-03-28

## 2023-03-13 RX ORDER — PREDNISONE 20 MG/1
40 TABLET ORAL DAILY
Qty: 6 TABLET | Refills: 0 | Status: SHIPPED | OUTPATIENT
Start: 2023-03-13 | End: 2023-03-16

## 2023-03-13 RX ORDER — PREDNISONE 20 MG/1
60 TABLET ORAL ONCE
Status: COMPLETED | OUTPATIENT
Start: 2023-03-13 | End: 2023-03-13

## 2023-03-13 RX ORDER — ACETAMINOPHEN AND CODEINE PHOSPHATE 300; 30 MG/1; MG/1
1 TABLET ORAL ONCE
Status: COMPLETED | OUTPATIENT
Start: 2023-03-13 | End: 2023-03-13

## 2023-03-13 NOTE — ED INITIAL ASSESSMENT (HPI)
Right knee pain for 6 months. Pain is getting worse. States she is having trouble ambulating now. States she has been using a cane for a week now. <--- Click to Launch ICDx for PreOp, PostOp and Procedure

## 2023-03-15 ENCOUNTER — OFFICE VISIT (OUTPATIENT)
Dept: SURGERY | Facility: CLINIC | Age: 48
End: 2023-03-15
Payer: COMMERCIAL

## 2023-03-15 ENCOUNTER — TELEPHONE (OUTPATIENT)
Dept: SURGERY | Facility: CLINIC | Age: 48
End: 2023-03-15

## 2023-03-15 VITALS
WEIGHT: 293 LBS | SYSTOLIC BLOOD PRESSURE: 118 MMHG | HEIGHT: 68 IN | BODY MASS INDEX: 44.41 KG/M2 | DIASTOLIC BLOOD PRESSURE: 86 MMHG | HEART RATE: 81 BPM

## 2023-03-15 DIAGNOSIS — M25.551 RIGHT HIP PAIN: ICD-10-CM

## 2023-03-15 DIAGNOSIS — M54.50 LUMBAR PAIN: Primary | ICD-10-CM

## 2023-03-15 DIAGNOSIS — G89.29 CHRONIC PAIN OF RIGHT KNEE: ICD-10-CM

## 2023-03-15 DIAGNOSIS — R20.0 NUMBNESS AND TINGLING: ICD-10-CM

## 2023-03-15 DIAGNOSIS — M54.16 LUMBAR RADICULOPATHY: ICD-10-CM

## 2023-03-15 DIAGNOSIS — R20.2 NUMBNESS AND TINGLING: ICD-10-CM

## 2023-03-15 DIAGNOSIS — M51.36 DDD (DEGENERATIVE DISC DISEASE), LUMBAR: ICD-10-CM

## 2023-03-15 DIAGNOSIS — M25.561 CHRONIC PAIN OF RIGHT KNEE: ICD-10-CM

## 2023-03-15 PROCEDURE — 99204 OFFICE O/P NEW MOD 45 MIN: CPT | Performed by: NEUROLOGICAL SURGERY

## 2023-03-15 PROCEDURE — 3008F BODY MASS INDEX DOCD: CPT | Performed by: NEUROLOGICAL SURGERY

## 2023-03-15 PROCEDURE — 3079F DIAST BP 80-89 MM HG: CPT | Performed by: NEUROLOGICAL SURGERY

## 2023-03-15 PROCEDURE — 3074F SYST BP LT 130 MM HG: CPT | Performed by: NEUROLOGICAL SURGERY

## 2023-03-15 RX ORDER — TRAZODONE HYDROCHLORIDE 100 MG/1
TABLET ORAL
COMMUNITY
Start: 2023-02-28

## 2023-03-15 NOTE — PROGRESS NOTES
Pt states she has low back pain. Pt states she also had right knee and right hip pain. Pt states she does have numbness or tingling on right leg. Pt did have PT but did not help. Pt did received injection and it did help. No h/o back surgery. Pt brought in imaging.

## 2023-03-30 ENCOUNTER — OFFICE VISIT (OUTPATIENT)
Dept: RHEUMATOLOGY | Facility: CLINIC | Age: 48
End: 2023-03-30

## 2023-03-30 VITALS
BODY MASS INDEX: 44.41 KG/M2 | DIASTOLIC BLOOD PRESSURE: 79 MMHG | HEIGHT: 68 IN | SYSTOLIC BLOOD PRESSURE: 116 MMHG | HEART RATE: 67 BPM | WEIGHT: 293 LBS

## 2023-03-30 DIAGNOSIS — M79.7 FIBROMYALGIA: Primary | ICD-10-CM

## 2023-03-30 DIAGNOSIS — M25.50 POLYARTHRALGIA: ICD-10-CM

## 2023-03-30 PROCEDURE — 3078F DIAST BP <80 MM HG: CPT | Performed by: INTERNAL MEDICINE

## 2023-03-30 PROCEDURE — 96372 THER/PROPH/DIAG INJ SC/IM: CPT | Performed by: INTERNAL MEDICINE

## 2023-03-30 PROCEDURE — 3074F SYST BP LT 130 MM HG: CPT | Performed by: INTERNAL MEDICINE

## 2023-03-30 PROCEDURE — 3008F BODY MASS INDEX DOCD: CPT | Performed by: INTERNAL MEDICINE

## 2023-03-30 PROCEDURE — 99214 OFFICE O/P EST MOD 30 MIN: CPT | Performed by: INTERNAL MEDICINE

## 2023-03-30 RX ORDER — KETOROLAC TROMETHAMINE 30 MG/ML
30 INJECTION, SOLUTION INTRAMUSCULAR; INTRAVENOUS ONCE
Status: COMPLETED | OUTPATIENT
Start: 2023-03-30 | End: 2023-03-30

## 2023-03-30 RX ADMIN — KETOROLAC TROMETHAMINE 30 MG: 30 INJECTION, SOLUTION INTRAMUSCULAR; INTRAVENOUS at 09:38:00

## 2023-03-30 NOTE — PATIENT INSTRUCTIONS
1. Cont.   duloxetine to 30mg twice a day -   2. - consider increasing this or trying savella  Info on savella given - talk to Dr. Roberto Steward to see if this is an optino when you see her. Taper off the duloxetine and then add the savella - - starter pack but highest dose would be 50mg a day instead of the full dose   3. Cont. Gabapentin 600mg four times a day   4. Cont. Lamotrigine And trazadone -   5.percocet per pain doctor  6. Return to clinic in 6 months. 7. Arcadio chi  8. Check labs   9.  Ketorlac 30mg im x 1

## 2023-04-06 ENCOUNTER — NURSE TRIAGE (OUTPATIENT)
Dept: INTERNAL MEDICINE CLINIC | Facility: CLINIC | Age: 48
End: 2023-04-06

## 2023-04-06 ENCOUNTER — TELEPHONE (OUTPATIENT)
Dept: NEUROLOGY | Facility: CLINIC | Age: 48
End: 2023-04-06

## 2023-04-06 ENCOUNTER — TELEPHONE (OUTPATIENT)
Dept: ORTHOPEDICS CLINIC | Facility: CLINIC | Age: 48
End: 2023-04-06

## 2023-04-06 DIAGNOSIS — G47.10 HYPERSOMNIA: Primary | ICD-10-CM

## 2023-04-06 DIAGNOSIS — M79.641 RIGHT HAND PAIN: Primary | ICD-10-CM

## 2023-04-06 NOTE — TELEPHONE ENCOUNTER
Called & spoke to patient. She has an appointment scheduled with Dr Stephanie Jain for 5/4 & is requesting an earlier appointment because she feels her tremors to JAYLA hands are getting worse & she thinks she has narcolepsy. She is currently taking propranolol 20 mg, 2 tabs po BID for tremors. She does not feel that it is helping. She has good & bad days with symptoms. She thinks she has narcolepsy because she does not go to sleep until 2 am & wakes up 1-2 times before she needs to get up for work in the morning.  She feels sleepy during the day & finds herself awake one minute & waking up 20 minutes later multiple times throughout the day

## 2023-04-06 NOTE — TELEPHONE ENCOUNTER
Called pt & notified of referral. Provided with contact info to schedule appt.  Pt has appt with Dr Dustin Ward for 4/25

## 2023-04-06 NOTE — TELEPHONE ENCOUNTER
For concerns of possible narcolepsy she will need to discuss it with the sleep clinic. We can place the referral.    We can bring her back sooner to discuss tremors.

## 2023-04-07 ENCOUNTER — OFFICE VISIT (OUTPATIENT)
Facility: CLINIC | Age: 48
End: 2023-04-07

## 2023-04-07 VITALS
HEIGHT: 68 IN | BODY MASS INDEX: 44.41 KG/M2 | WEIGHT: 293 LBS | DIASTOLIC BLOOD PRESSURE: 64 MMHG | SYSTOLIC BLOOD PRESSURE: 116 MMHG

## 2023-04-07 DIAGNOSIS — R12 HEART BURN: ICD-10-CM

## 2023-04-07 PROCEDURE — 3074F SYST BP LT 130 MM HG: CPT | Performed by: INTERNAL MEDICINE

## 2023-04-07 PROCEDURE — 3078F DIAST BP <80 MM HG: CPT | Performed by: INTERNAL MEDICINE

## 2023-04-07 PROCEDURE — 99244 OFF/OP CNSLTJ NEW/EST MOD 40: CPT | Performed by: INTERNAL MEDICINE

## 2023-04-07 PROCEDURE — 3008F BODY MASS INDEX DOCD: CPT | Performed by: INTERNAL MEDICINE

## 2023-04-07 RX ORDER — OMEPRAZOLE 40 MG/1
40 CAPSULE, DELAYED RELEASE ORAL DAILY
Qty: 90 CAPSULE | Refills: 3 | Status: SHIPPED | OUTPATIENT
Start: 2023-04-07 | End: 2023-07-06

## 2023-04-07 RX ORDER — SUCRALFATE 1 G/1
1 TABLET ORAL
Qty: 90 TABLET | Refills: 2 | Status: SHIPPED | OUTPATIENT
Start: 2023-04-07 | End: 2023-07-06

## 2023-04-07 NOTE — PATIENT INSTRUCTIONS
Change to taking omeprazole 30 min before dinner  Add carafate 1-3x a day before meals  Avoid caffeine, chocolate, peppermint, and alcohol. Also avoid lying down flat or in a recumbent position for 3 hours after a meal.   To get help with weight loss, I recommend seeing Dr. Tamiko Hearn at [facility]. Please call Phone: [phone] to set up appointment.

## 2023-04-12 ENCOUNTER — OFFICE VISIT (OUTPATIENT)
Dept: NEUROLOGY | Facility: CLINIC | Age: 48
End: 2023-04-12
Payer: COMMERCIAL

## 2023-04-12 DIAGNOSIS — G56.03 BILATERAL CARPAL TUNNEL SYNDROME: ICD-10-CM

## 2023-04-12 DIAGNOSIS — G25.0 ESSENTIAL TREMOR: Primary | ICD-10-CM

## 2023-04-12 PROCEDURE — 99214 OFFICE O/P EST MOD 30 MIN: CPT | Performed by: OTHER

## 2023-04-12 RX ORDER — PROPRANOLOL HYDROCHLORIDE 60 MG/1
60 TABLET ORAL 2 TIMES DAILY
Qty: 180 TABLET | Refills: 3 | Status: SHIPPED | OUTPATIENT
Start: 2023-04-12

## 2023-04-14 ENCOUNTER — TELEPHONE (OUTPATIENT)
Facility: CLINIC | Age: 48
End: 2023-04-14

## 2023-04-14 NOTE — TELEPHONE ENCOUNTER
Pt left message on voicemail, nurse called pt, no answer, left message for pt to call back.     [phone] (home)

## 2023-04-17 ENCOUNTER — TELEPHONE (OUTPATIENT)
Dept: INTERNAL MEDICINE CLINIC | Facility: CLINIC | Age: 48
End: 2023-04-17

## 2023-04-17 ENCOUNTER — OFFICE VISIT (OUTPATIENT)
Dept: INTERNAL MEDICINE CLINIC | Facility: CLINIC | Age: 48
End: 2023-04-17

## 2023-04-17 ENCOUNTER — HOSPITAL ENCOUNTER (OUTPATIENT)
Dept: ULTRASOUND IMAGING | Facility: HOSPITAL | Age: 48
End: 2023-04-17
Attending: NURSE PRACTITIONER
Payer: COMMERCIAL

## 2023-04-17 VITALS
HEIGHT: 68 IN | DIASTOLIC BLOOD PRESSURE: 72 MMHG | WEIGHT: 293 LBS | SYSTOLIC BLOOD PRESSURE: 137 MMHG | HEART RATE: 69 BPM | BODY MASS INDEX: 44.41 KG/M2 | OXYGEN SATURATION: 96 %

## 2023-04-17 DIAGNOSIS — R05.9 COUGH, UNSPECIFIED TYPE: Primary | ICD-10-CM

## 2023-04-17 DIAGNOSIS — I83.892 VARICOSE VEINS OF LEFT LOWER EXTREMITY WITH COMPLICATIONS: ICD-10-CM

## 2023-04-17 DIAGNOSIS — L53.9 ERYTHEMA OF LOWER EXTREMITY: ICD-10-CM

## 2023-04-17 LAB — VIT B12 SERPL-MCNC: 452 PG/ML (ref 193–986)

## 2023-04-17 PROCEDURE — 82607 VITAMIN B-12: CPT | Performed by: OTHER

## 2023-04-17 PROCEDURE — 99214 OFFICE O/P EST MOD 30 MIN: CPT | Performed by: NURSE PRACTITIONER

## 2023-04-17 PROCEDURE — 3075F SYST BP GE 130 - 139MM HG: CPT | Performed by: NURSE PRACTITIONER

## 2023-04-17 PROCEDURE — 3078F DIAST BP <80 MM HG: CPT | Performed by: NURSE PRACTITIONER

## 2023-04-17 PROCEDURE — 84446 ASSAY OF VITAMIN E: CPT | Performed by: OTHER

## 2023-04-17 PROCEDURE — 3008F BODY MASS INDEX DOCD: CPT | Performed by: NURSE PRACTITIONER

## 2023-04-17 PROCEDURE — 84207 ASSAY OF VITAMIN B-6: CPT | Performed by: OTHER

## 2023-04-17 RX ORDER — BENZONATATE 200 MG/1
200 CAPSULE ORAL 3 TIMES DAILY PRN
Qty: 30 CAPSULE | Refills: 0 | Status: SHIPPED | OUTPATIENT
Start: 2023-04-17

## 2023-04-17 RX ORDER — OXCARBAZEPINE 600 MG/1
600 TABLET, FILM COATED ORAL 2 TIMES DAILY
COMMUNITY
Start: 2023-03-26

## 2023-04-17 RX ORDER — ALBUTEROL SULFATE 90 UG/1
2 AEROSOL, METERED RESPIRATORY (INHALATION) EVERY 4 HOURS PRN
Qty: 18 G | Refills: 0 | Status: SHIPPED | OUTPATIENT
Start: 2023-04-17

## 2023-04-17 RX ORDER — ALPRAZOLAM 0.25 MG/1
TABLET ORAL
COMMUNITY
Start: 2023-04-10

## 2023-04-17 RX ORDER — AZITHROMYCIN 250 MG/1
TABLET, FILM COATED ORAL
Qty: 6 TABLET | Refills: 0 | Status: SHIPPED | OUTPATIENT
Start: 2023-04-17 | End: 2023-04-22

## 2023-04-17 RX ORDER — IBUPROFEN 800 MG/1
TABLET ORAL
COMMUNITY
Start: 2023-04-11

## 2023-04-17 RX ORDER — POTASSIUM CHLORIDE 20 MEQ/1
TABLET, EXTENDED RELEASE ORAL
COMMUNITY
Start: 2023-03-21

## 2023-04-17 NOTE — TELEPHONE ENCOUNTER
Per patient, she went to see Dr Estrella Joel (pain specialist -outside network) after seeing Vivek Swan earlier today and she just did the US doppler with the pain specialist, patient  will fax the test results in 2-3 days, fax number provided per request [phone]

## 2023-04-17 NOTE — TELEPHONE ENCOUNTER
Spoke with patient, verified name and . Informed patient Sas order a ultrasound STAT and informed patient her ultrasounds is at 1pm at the hospital. Patient didn't have no further questions.

## 2023-04-18 ENCOUNTER — TELEPHONE (OUTPATIENT)
Dept: NEUROLOGY | Facility: CLINIC | Age: 48
End: 2023-04-18

## 2023-04-18 NOTE — TELEPHONE ENCOUNTER
----- Message from Coral Mendez MD sent at 4/18/2023  6:11 AM CDT -----  Please let the patient know that results of these particular lab tests so far were normal.    Thank you

## 2023-04-18 NOTE — TELEPHONE ENCOUNTER
----- Message from Rachael Gonsalez MD sent at 4/18/2023  2:57 PM CDT -----  Please let the patient know that results of these particular lab tests so far were normal.    Thank you

## 2023-04-19 LAB
VIT E ALPHA TOCO: 8.6 MG/L
VIT E GAMMA TOCO: 0.7 MG/L

## 2023-04-20 NOTE — TELEPHONE ENCOUNTER
Called Dr Earl Perez office to inform them that we didn't receive patient US results. Request for them to re-fax results at [phone]. Will be receiving US results soon.

## 2023-04-24 ENCOUNTER — TELEPHONE (OUTPATIENT)
Dept: RHEUMATOLOGY | Facility: CLINIC | Age: 48
End: 2023-04-24

## 2023-04-24 ENCOUNTER — TELEPHONE (OUTPATIENT)
Dept: NEUROLOGY | Facility: CLINIC | Age: 48
End: 2023-04-24

## 2023-04-24 ENCOUNTER — PATIENT MESSAGE (OUTPATIENT)
Dept: ALLERGY | Facility: CLINIC | Age: 48
End: 2023-04-24

## 2023-04-24 NOTE — TELEPHONE ENCOUNTER
Received US results from [address] [place] [address]e [facility]. Results were placed on provider's desk for review.

## 2023-04-24 NOTE — TELEPHONE ENCOUNTER
Per patient she would like to talk to doctor or nurses in regards to her test result that was gone 04/17/2023.

## 2023-04-24 NOTE — TELEPHONE ENCOUNTER
----- Message from Mike Nelson MD sent at 4/21/2023 12:38 PM CDT -----  Please let the patient know that results of these particular lab tests so far were normal.    Thank you

## 2023-04-24 NOTE — TELEPHONE ENCOUNTER
----- Message from Dave Borges MD sent at 4/21/2023 12:38 PM CDT -----  Please let the patient know that results of these particular lab tests so far were normal.    Thank you

## 2023-04-27 NOTE — TELEPHONE ENCOUNTER
From: Sharron Pedro  To: Erica Harmon MD  Sent: 4/24/2023 9:01 AM CDT  Subject: Selvin Degroot    you saw my daughter a while back and prescribed to her levocetirizine and a nasal spray. the liquid medication is too expensive for us to continue. and it wasn't doing any better than the others anyway. the nasal spray was causing daily nose bleeds so i stopped her from taking it. took a while but now she's much better as far as the nose bleeds are concerned. is there anything else we can try for her as far as preventive meds for her allergies?

## 2023-05-12 ENCOUNTER — APPOINTMENT (OUTPATIENT)
Dept: URBAN - METROPOLITAN AREA CLINIC 248 | Age: 48
Setting detail: DERMATOLOGY
End: 2023-05-12

## 2023-05-12 DIAGNOSIS — B35.1 TINEA UNGUIUM: ICD-10-CM

## 2023-05-12 PROCEDURE — OTHER COUNSELING: OTHER

## 2023-05-12 PROCEDURE — OTHER PRESCRIPTION: OTHER

## 2023-05-12 PROCEDURE — 99203 OFFICE O/P NEW LOW 30 MIN: CPT

## 2023-05-12 RX ORDER — EFINACONAZOLE 100 MG/ML
SOLUTION TOPICAL
Qty: 8 | Refills: 11 | Status: CANCELLED | COMMUNITY
Start: 2023-05-12

## 2023-05-12 RX ORDER — EFINACONAZOLE 100 MG/ML
SOLUTION TOPICAL
Qty: 8 | Refills: 11 | Status: ERX | COMMUNITY
Start: 2023-05-12

## 2023-05-12 ASSESSMENT — LOCATION DETAILED DESCRIPTION DERM: LOCATION DETAILED: LEFT THUMBNAIL

## 2023-05-12 ASSESSMENT — LOCATION ZONE DERM: LOCATION ZONE: FINGERNAIL

## 2023-05-12 ASSESSMENT — LOCATION SIMPLE DESCRIPTION DERM: LOCATION SIMPLE: LEFT THUMBNAIL

## 2023-05-18 ENCOUNTER — RX ONLY (RX ONLY)
Age: 48
End: 2023-05-18

## 2023-05-18 RX ORDER — ITRACONAZOLE
POWDER (GRAM) MISCELLANEOUS NIGHTLY
Qty: 1 | Refills: 5 | Status: ERX | COMMUNITY
Start: 2023-05-18

## 2023-06-21 ENCOUNTER — PROCEDURE VISIT (OUTPATIENT)
Dept: NEUROLOGY | Facility: CLINIC | Age: 48
End: 2023-06-21
Payer: COMMERCIAL

## 2023-06-21 DIAGNOSIS — G56.03 BILATERAL CARPAL TUNNEL SYNDROME: Primary | ICD-10-CM

## 2023-07-24 DIAGNOSIS — G25.0 ESSENTIAL TREMOR: ICD-10-CM

## 2023-07-24 DIAGNOSIS — R05.9 COUGH, UNSPECIFIED TYPE: ICD-10-CM

## 2023-07-24 NOTE — TELEPHONE ENCOUNTER
Pharmacy is requesting refill        albuterol 108 (90 Base) MCG/ACT Inhalation Aero Soln, Inhale 2 puffs into the lungs every 4 (four) hours as needed for Shortness of Breath., Disp: 18 g, Rfl: 0

## 2023-07-24 NOTE — TELEPHONE ENCOUNTER
Request received for 90 day supply of propranolol      Previous prescription:  Medication Quantity Refills Start End   propranolol 60 MG Oral Tab 180 tablet 3 4/12/2023    Sig:   Take 1 tablet (60 mg total) by mouth 2 (two) times daily.  Start with 1 pill daily for 1 week, then 1 pill BID for another week, then 2 pills BID     Route:   Oral     Order #:   [id number]

## 2023-07-25 RX ORDER — PROPRANOLOL HYDROCHLORIDE 60 MG/1
120 TABLET ORAL 2 TIMES DAILY
Qty: 360 TABLET | Refills: 1 | Status: SHIPPED | OUTPATIENT
Start: 2023-07-25 | End: 2023-07-28

## 2023-07-25 NOTE — TELEPHONE ENCOUNTER
Medication was prescribed for cough. No history of asthma. Pt to verify request.  [email or web address] message sent.

## 2023-07-27 RX ORDER — ALBUTEROL SULFATE 90 UG/1
2 AEROSOL, METERED RESPIRATORY (INHALATION) EVERY 4 HOURS PRN
Qty: 18 G | Refills: 3 | OUTPATIENT
Start: 2023-07-27

## 2023-07-27 NOTE — TELEPHONE ENCOUNTER
Pt not view [employer] message yet. Routed to Triage for assistance, thanks.       Delivery Read From Message Type Attachments Subject   7/25/2023  9:31 AM Vadim Dennis RN Patient Medical Advice Request  albuterol inhaler

## 2023-07-28 ENCOUNTER — OFFICE VISIT (OUTPATIENT)
Dept: SURGERY | Facility: CLINIC | Age: 48
End: 2023-07-28
Payer: COMMERCIAL

## 2023-07-28 VITALS
BODY MASS INDEX: 47.09 KG/M2 | WEIGHT: 293 LBS | SYSTOLIC BLOOD PRESSURE: 130 MMHG | HEART RATE: 99 BPM | OXYGEN SATURATION: 95 % | DIASTOLIC BLOOD PRESSURE: 78 MMHG | HEIGHT: 66 IN

## 2023-07-28 DIAGNOSIS — Z90.3 HISTORY OF SLEEVE GASTRECTOMY: Primary | ICD-10-CM

## 2023-07-28 DIAGNOSIS — M19.90 ARTHRITIS: ICD-10-CM

## 2023-07-28 DIAGNOSIS — E66.01 MORBID OBESITY WITH BMI OF 60.0-69.9, ADULT (HCC): ICD-10-CM

## 2023-07-28 DIAGNOSIS — R63.2 BINGE EATING: ICD-10-CM

## 2023-07-28 DIAGNOSIS — F31.9 BIPOLAR AFFECTIVE DISORDER, REMISSION STATUS UNSPECIFIED (HCC): ICD-10-CM

## 2023-07-28 DIAGNOSIS — E78.5 DYSLIPIDEMIA: ICD-10-CM

## 2023-07-28 DIAGNOSIS — I89.0 LYMPHEDEMA OF BOTH LOWER EXTREMITIES: ICD-10-CM

## 2023-07-28 PROCEDURE — 3075F SYST BP GE 130 - 139MM HG: CPT | Performed by: NURSE PRACTITIONER

## 2023-07-28 PROCEDURE — 3008F BODY MASS INDEX DOCD: CPT | Performed by: NURSE PRACTITIONER

## 2023-07-28 PROCEDURE — 99204 OFFICE O/P NEW MOD 45 MIN: CPT | Performed by: NURSE PRACTITIONER

## 2023-07-28 PROCEDURE — 3078F DIAST BP <80 MM HG: CPT | Performed by: NURSE PRACTITIONER

## 2023-07-28 RX ORDER — TOPIRAMATE 50 MG/1
50 TABLET, FILM COATED ORAL 4 TIMES DAILY
COMMUNITY

## 2023-07-28 RX ORDER — FENOFIBRATE 145 MG/1
145 TABLET, COATED ORAL DAILY
COMMUNITY

## 2023-07-28 RX ORDER — PEN NEEDLE, DIABETIC 30 GX3/16"
1 NEEDLE, DISPOSABLE MISCELLANEOUS DAILY
Qty: 100 EACH | Refills: 1 | Status: SHIPPED | OUTPATIENT
Start: 2023-07-28

## 2023-07-28 RX ORDER — LIRAGLUTIDE 6 MG/ML
3 INJECTION, SOLUTION SUBCUTANEOUS DAILY
Qty: 15 ML | Refills: 1 | Status: SHIPPED | OUTPATIENT
Start: 2023-07-28

## 2023-07-28 NOTE — PATIENT INSTRUCTIONS
This is the Saxenda dose schedule:  Inject 3 mg into the subcutaneous tissue daily. Week 1- 0.6mg daily. Week 2- 1.2mg daily. Week 3- 1.8mg daily. Week 4- 2.4mg daily. Week 5- 3mg daily. You do not have to increase the dose if you are having side effects such as vomiting, nausea, constipation, or significant fatigue. You can remain on the current dose until the side effects become tolerable. I recommend a whole food, plant powered diet with low glycemic index:     Aim for 3 meals a day and 1-2 snacks as needed. Aim for a protein + produce at each meal time. Keep meals between 7 am and 7 pm.     Breakfast ideas:  1. Fruit and nuts/seeds. 2. Eggs scrambled with vegetables. 3. Oatmeal (stovetop), cinnamon, 2 tbsp flaxseed, berries, nuts. 4. Protein shake + fruit. (1 scoop with water/ice). [employer] [place] [address], [place], [place], and [place] are good brands. Snacks:  Raw vegetables and hummus, apples and peanut butter, nuts, seeds, fruit, pecans drizzled with organic honey. Use the Healthy Plate method for lunch and dinner:  1/2 right side of plate non-starchy vegetables. Bottom left 1/4 plate protein. Top left 1/4 starch as desired. Aim for a total of:  2 fruits a day (avocado, tomato, citrus/oranges, apples, berries)  1/2 cup or medium size    4 non-starchy vegetables (handful) (greens, peppers, onions, garlic, broccoli, cauliflower, etc.)    0-2 starches (oatmeal, sweet potato, carrots, brown rice, etc). 3 protein per day (fish, seafood, meat, or plants: salmon, nuts, seeds, shrimp, chicken, turkey, beans, lentils, chickpeas, etc).     2 healthy fats (avocado, avocado oil, olives, olive oil, salmon, nuts, seeds)

## 2023-08-04 ENCOUNTER — TELEPHONE (OUTPATIENT)
Dept: INTERNAL MEDICINE CLINIC | Facility: CLINIC | Age: 48
End: 2023-08-04

## 2023-08-04 NOTE — TELEPHONE ENCOUNTER
Lainey Osorio  [phone]  Fax: [phone]  E-scribe: [phone], [place] [id number]    Requesting refill of pt's Rx Albuterol 90mcg, in a 90-day supply

## 2023-08-07 ENCOUNTER — HOSPITAL ENCOUNTER (OUTPATIENT)
Dept: GENERAL RADIOLOGY | Age: 48
Discharge: HOME | End: 2023-08-07
Attending: NURSE PRACTITIONER
Payer: COMMERCIAL

## 2023-08-07 ENCOUNTER — OFFICE VISIT (OUTPATIENT)
Dept: INTERNAL MEDICINE CLINIC | Facility: CLINIC | Age: 48
End: 2023-08-07

## 2023-08-07 VITALS
HEIGHT: 66 IN | RESPIRATION RATE: 16 BRPM | DIASTOLIC BLOOD PRESSURE: 80 MMHG | WEIGHT: 293 LBS | HEART RATE: 76 BPM | SYSTOLIC BLOOD PRESSURE: 120 MMHG | BODY MASS INDEX: 47.09 KG/M2

## 2023-08-07 DIAGNOSIS — R06.2 WHEEZING: ICD-10-CM

## 2023-08-07 DIAGNOSIS — R06.2 WHEEZING: Primary | ICD-10-CM

## 2023-08-07 DIAGNOSIS — G47.33 OSA (OBSTRUCTIVE SLEEP APNEA): ICD-10-CM

## 2023-08-07 PROCEDURE — 3008F BODY MASS INDEX DOCD: CPT | Performed by: NURSE PRACTITIONER

## 2023-08-07 PROCEDURE — 3079F DIAST BP 80-89 MM HG: CPT | Performed by: NURSE PRACTITIONER

## 2023-08-07 PROCEDURE — 99214 OFFICE O/P EST MOD 30 MIN: CPT | Performed by: NURSE PRACTITIONER

## 2023-08-07 PROCEDURE — 3074F SYST BP LT 130 MM HG: CPT | Performed by: NURSE PRACTITIONER

## 2023-08-07 PROCEDURE — 71046 X-RAY EXAM CHEST 2 VIEWS: CPT | Performed by: NURSE PRACTITIONER

## 2023-08-07 RX ORDER — MONTELUKAST SODIUM 10 MG/1
10 TABLET ORAL NIGHTLY
Qty: 90 TABLET | Refills: 0 | Status: SHIPPED | OUTPATIENT
Start: 2023-08-07

## 2023-08-07 RX ORDER — ALBUTEROL SULFATE 90 UG/1
2 AEROSOL, METERED RESPIRATORY (INHALATION) EVERY 4 HOURS PRN
Qty: 18 G | Refills: 5 | Status: SHIPPED | OUTPATIENT
Start: 2023-08-07

## 2023-08-09 ENCOUNTER — OFFICE VISIT (OUTPATIENT)
Dept: SURGERY | Facility: CLINIC | Age: 48
End: 2023-08-09
Payer: COMMERCIAL

## 2023-08-09 DIAGNOSIS — E78.5 DYSLIPIDEMIA: ICD-10-CM

## 2023-08-09 DIAGNOSIS — Z90.3 HISTORY OF SLEEVE GASTRECTOMY: ICD-10-CM

## 2023-08-09 DIAGNOSIS — E66.01 MORBID OBESITY WITH BMI OF 60.0-69.9, ADULT (HCC): Primary | ICD-10-CM

## 2023-08-09 DIAGNOSIS — R63.2 BINGE EATING: ICD-10-CM

## 2023-08-09 PROCEDURE — 97802 MEDICAL NUTRITION INDIV IN: CPT | Performed by: DIETITIAN, REGISTERED

## 2023-08-09 NOTE — PATIENT INSTRUCTIONS
Goals:   1) Aim for 70-90g of protein per day  2) Aim for 64 oz of water per day (try flavoring with fruit or soda water: hint water/waterloo)  3) Try logging intake with My Net Diary (green apple w/tape measure): set it for 2 lbs per week weight loss  4) For weight loss 9840-3473 cals/d for 1-2 pounds/week weight   5) Aim for <130g of carbohydrates per day: see carb counting handout  6) Can try Orgain organic simple protein powder for added protein  7) Try yasso bars, string cheese, nuts to snack on at night

## 2023-09-05 ENCOUNTER — TELEPHONE (OUTPATIENT)
Dept: PULMONOLOGY | Facility: CLINIC | Age: 48
End: 2023-09-05

## 2023-09-05 NOTE — TELEPHONE ENCOUNTER
Pt states she is having a lot of wheezing and coughing and wondering if she can be seen sooner than 10/24.  The schedule is booked please call

## 2023-09-06 NOTE — TELEPHONE ENCOUNTER
Spoke with patient. Appointment scheduled for 9/8/23 at 1PM. Verified date, time, place, and where to park. Patient verbalized understanding.

## 2023-09-08 ENCOUNTER — OFFICE VISIT (OUTPATIENT)
Dept: PULMONOLOGY | Facility: CLINIC | Age: 48
End: 2023-09-08

## 2023-09-08 VITALS
OXYGEN SATURATION: 96 % | RESPIRATION RATE: 20 BRPM | WEIGHT: 293 LBS | SYSTOLIC BLOOD PRESSURE: 128 MMHG | HEIGHT: 67 IN | HEART RATE: 86 BPM | BODY MASS INDEX: 45.99 KG/M2 | DIASTOLIC BLOOD PRESSURE: 80 MMHG

## 2023-09-08 DIAGNOSIS — G47.33 OSA (OBSTRUCTIVE SLEEP APNEA): Primary | ICD-10-CM

## 2023-09-08 DIAGNOSIS — R06.2 WHEEZING: ICD-10-CM

## 2023-09-08 DIAGNOSIS — J30.9 ALLERGIC RHINITIS, UNSPECIFIED SEASONALITY, UNSPECIFIED TRIGGER: ICD-10-CM

## 2023-09-08 DIAGNOSIS — R06.09 DYSPNEA ON EXERTION: ICD-10-CM

## 2023-09-08 DIAGNOSIS — E66.01 CLASS 3 SEVERE OBESITY DUE TO EXCESS CALORIES WITH SERIOUS COMORBIDITY AND BODY MASS INDEX (BMI) OF 60.0 TO 69.9 IN ADULT (HCC): ICD-10-CM

## 2023-09-08 PROCEDURE — 99204 OFFICE O/P NEW MOD 45 MIN: CPT | Performed by: PHYSICIAN ASSISTANT

## 2023-09-08 PROCEDURE — 3074F SYST BP LT 130 MM HG: CPT | Performed by: PHYSICIAN ASSISTANT

## 2023-09-08 PROCEDURE — 3008F BODY MASS INDEX DOCD: CPT | Performed by: PHYSICIAN ASSISTANT

## 2023-09-08 PROCEDURE — 94761 N-INVAS EAR/PLS OXIMETRY MLT: CPT | Performed by: PHYSICIAN ASSISTANT

## 2023-09-08 PROCEDURE — 3079F DIAST BP 80-89 MM HG: CPT | Performed by: PHYSICIAN ASSISTANT

## 2023-09-08 RX ORDER — ZOLPIDEM TARTRATE 10 MG/1
10 TABLET ORAL NIGHTLY PRN
COMMUNITY
Start: 2023-09-05

## 2023-09-13 ENCOUNTER — HOSPITAL ENCOUNTER (OUTPATIENT)
Dept: RESPIRATORY THERAPY | Facility: HOSPITAL | Age: 48
Discharge: HOME | End: 2023-09-13
Attending: PHYSICIAN ASSISTANT
Payer: COMMERCIAL

## 2023-09-13 PROBLEM — R06.2 WHEEZING: Status: ACTIVE | Noted: 2023-09-13

## 2023-09-13 PROCEDURE — 94729 DIFFUSING CAPACITY: CPT

## 2023-09-13 PROCEDURE — 94060 EVALUATION OF WHEEZING: CPT

## 2023-09-13 PROCEDURE — 94726 PLETHYSMOGRAPHY LUNG VOLUMES: CPT

## 2023-09-27 ENCOUNTER — TELEMEDICINE (OUTPATIENT)
Dept: SURGERY | Facility: CLINIC | Age: 48
End: 2023-09-27
Payer: COMMERCIAL

## 2023-09-27 ENCOUNTER — TELEPHONE (OUTPATIENT)
Dept: SURGERY | Facility: CLINIC | Age: 48
End: 2023-09-27

## 2023-09-27 VITALS — BODY MASS INDEX: 60 KG/M2 | WEIGHT: 293 LBS

## 2023-09-27 DIAGNOSIS — M19.90 ARTHRITIS: ICD-10-CM

## 2023-09-27 DIAGNOSIS — I89.0 LYMPHEDEMA OF BOTH LOWER EXTREMITIES: ICD-10-CM

## 2023-09-27 DIAGNOSIS — E78.5 DYSLIPIDEMIA: Primary | ICD-10-CM

## 2023-09-27 DIAGNOSIS — F31.9 BIPOLAR AFFECTIVE DISORDER, REMISSION STATUS UNSPECIFIED (HCC): ICD-10-CM

## 2023-09-27 DIAGNOSIS — Z90.3 HISTORY OF SLEEVE GASTRECTOMY: ICD-10-CM

## 2023-09-27 DIAGNOSIS — R63.2 BINGE EATING: ICD-10-CM

## 2023-09-27 DIAGNOSIS — G89.29 OTHER CHRONIC PAIN: ICD-10-CM

## 2023-09-27 DIAGNOSIS — E66.01 MORBID OBESITY WITH BMI OF 60.0-69.9, ADULT (HCC): ICD-10-CM

## 2023-09-27 PROCEDURE — 99214 OFFICE O/P EST MOD 30 MIN: CPT | Performed by: NURSE PRACTITIONER

## 2023-09-27 RX ORDER — LIRAGLUTIDE 6 MG/ML
3 INJECTION, SOLUTION SUBCUTANEOUS DAILY
COMMUNITY
Start: 2023-09-27

## 2023-09-27 RX ORDER — PEN NEEDLE, DIABETIC 30 GX3/16"
1 NEEDLE, DISPOSABLE MISCELLANEOUS DAILY
COMMUNITY
Start: 2023-09-27

## 2023-09-27 RX ORDER — SEMAGLUTIDE 1.7 MG/.75ML
1.7 INJECTION, SOLUTION SUBCUTANEOUS WEEKLY
Qty: 4 EACH | Refills: 1 | Status: SHIPPED | OUTPATIENT
Start: 2023-09-27 | End: 2023-09-27

## 2023-09-27 RX ORDER — ONDANSETRON 4 MG/1
4 TABLET, ORALLY DISINTEGRATING ORAL EVERY 8 HOURS PRN
Qty: 30 TABLET | Refills: 0 | Status: SHIPPED | OUTPATIENT
Start: 2023-09-27

## 2023-09-27 NOTE — TELEPHONE ENCOUNTER
Noted. Switch back to [address] [place] [address]. Fill prescription when available. Patient notified.

## 2023-10-03 ENCOUNTER — OFFICE VISIT (OUTPATIENT)
Dept: SLEEP CENTER | Age: 48
End: 2023-10-03
Attending: PHYSICIAN ASSISTANT
Payer: COMMERCIAL

## 2023-10-03 DIAGNOSIS — Z76.89 SLEEP CONCERN: Primary | ICD-10-CM

## 2023-10-03 PROCEDURE — 95810 POLYSOM 6/> YRS 4/> PARAM: CPT

## 2023-10-04 ENCOUNTER — TELEPHONE (OUTPATIENT)
Dept: SURGERY | Facility: CLINIC | Age: 48
End: 2023-10-04

## 2023-10-05 DIAGNOSIS — E66.01 MORBID OBESITY WITH BMI OF 60.0-69.9, ADULT (HCC): Primary | ICD-10-CM

## 2023-10-05 RX ORDER — SEMAGLUTIDE 2.4 MG/.75ML
2.4 INJECTION, SOLUTION SUBCUTANEOUS WEEKLY
Qty: 9 ML | Refills: 0 | Status: SHIPPED | OUTPATIENT
Start: 2023-10-05

## 2023-10-05 NOTE — TELEPHONE ENCOUNTER
Spoke to patient. No Saxenda available. She stated that she picked up Wegovy 1.7 mg weekly. Advised patient to start medication. Switch to [facility]MARTÍNEZ REID. At time for refill there will be a 90 day supply of 2.4 mg weekly, she should increase to this dose in one month as tolerated. All questions answered.

## 2023-10-06 ENCOUNTER — TELEPHONE (OUTPATIENT)
Dept: PULMONOLOGY | Facility: CLINIC | Age: 48
End: 2023-10-06

## 2023-10-06 RX ORDER — ZOLPIDEM TARTRATE 6.25 MG/1
6.25 TABLET, FILM COATED, EXTENDED RELEASE ORAL NIGHTLY PRN
Qty: 10 TABLET | Refills: 0 | Status: SHIPPED | OUTPATIENT
Start: 2023-10-06

## 2023-10-06 NOTE — TELEPHONE ENCOUNTER
Spoke with patient regarding sleep study results. No evidence of sleep disordered breathing. Patient has had rare sleep paralysis in the past. No hypnagogic hallucinations, urge to move the limbs, or cataplexy. Doubt narcolepsy. She takes Ambien 10 mg Sun-Thurs but continues to have difficulty maintaining sleep. Her biggest issue is frequent nocturnal awakenings. Suggest we try Ambien CR to see if this help. We discussed sleep hygiene and I sent her information via [employer].

## 2023-10-09 ENCOUNTER — OFFICE VISIT (OUTPATIENT)
Dept: WOUND CARE | Facility: HOSPITAL | Age: 48
End: 2023-10-09
Attending: NURSE PRACTITIONER
Payer: COMMERCIAL

## 2023-10-09 VITALS
BODY MASS INDEX: 44.41 KG/M2 | TEMPERATURE: 97 F | WEIGHT: 293 LBS | SYSTOLIC BLOOD PRESSURE: 128 MMHG | HEIGHT: 68 IN | OXYGEN SATURATION: 98 % | DIASTOLIC BLOOD PRESSURE: 79 MMHG | HEART RATE: 77 BPM | RESPIRATION RATE: 18 BRPM

## 2023-10-09 DIAGNOSIS — I83.892 VARICOSE VEINS OF LEFT LOWER EXTREMITY WITH COMPLICATIONS: Primary | ICD-10-CM

## 2023-10-09 DIAGNOSIS — I89.0 LYMPHEDEMA OF BOTH LOWER EXTREMITIES: ICD-10-CM

## 2023-10-09 DIAGNOSIS — E66.01 MORBID OBESITY WITH BMI OF 60.0-69.9, ADULT (HCC): ICD-10-CM

## 2023-10-09 DIAGNOSIS — I89.0 CHRONIC ACQUIRED LYMPHEDEMA: ICD-10-CM

## 2023-10-09 DIAGNOSIS — L03.116 LEFT LEG CELLULITIS: ICD-10-CM

## 2023-10-09 PROCEDURE — 99215 OFFICE O/P EST HI 40 MIN: CPT | Performed by: NURSE PRACTITIONER

## 2023-10-09 RX ORDER — CLINDAMYCIN HYDROCHLORIDE 300 MG/1
300 CAPSULE ORAL 3 TIMES DAILY
Qty: 21 CAPSULE | Refills: 0 | Status: SHIPPED | OUTPATIENT
Start: 2023-10-09 | End: 2023-10-16

## 2023-10-16 ENCOUNTER — OFFICE VISIT (OUTPATIENT)
Dept: WOUND CARE | Facility: HOSPITAL | Age: 48
End: 2023-10-16
Attending: NURSE PRACTITIONER
Payer: COMMERCIAL

## 2023-10-16 VITALS
RESPIRATION RATE: 17 BRPM | TEMPERATURE: 98 F | DIASTOLIC BLOOD PRESSURE: 76 MMHG | HEART RATE: 78 BPM | SYSTOLIC BLOOD PRESSURE: 114 MMHG | OXYGEN SATURATION: 97 %

## 2023-10-16 DIAGNOSIS — E66.01 MORBID OBESITY WITH BMI OF 60.0-69.9, ADULT (HCC): ICD-10-CM

## 2023-10-16 DIAGNOSIS — I89.0 LYMPHEDEMA OF BOTH LOWER EXTREMITIES: ICD-10-CM

## 2023-10-16 DIAGNOSIS — I83.892 VARICOSE VEINS OF LEFT LOWER EXTREMITY WITH COMPLICATIONS: Primary | ICD-10-CM

## 2023-10-16 PROCEDURE — 99213 OFFICE O/P EST LOW 20 MIN: CPT | Performed by: NURSE PRACTITIONER

## 2023-10-24 ENCOUNTER — OFFICE VISIT (OUTPATIENT)
Dept: PULMONOLOGY | Facility: CLINIC | Age: 48
End: 2023-10-24

## 2023-10-24 VITALS
DIASTOLIC BLOOD PRESSURE: 58 MMHG | OXYGEN SATURATION: 99 % | HEART RATE: 81 BPM | BODY MASS INDEX: 44.41 KG/M2 | WEIGHT: 293 LBS | SYSTOLIC BLOOD PRESSURE: 92 MMHG | HEIGHT: 68 IN

## 2023-10-24 DIAGNOSIS — R06.09 DYSPNEA ON EXERTION: Primary | ICD-10-CM

## 2023-10-24 DIAGNOSIS — G47.10 HYPERSOMNIA: ICD-10-CM

## 2023-10-24 DIAGNOSIS — G47.00 INSOMNIA, UNSPECIFIED TYPE: ICD-10-CM

## 2023-10-24 DIAGNOSIS — R06.01 ORTHOPNEA: ICD-10-CM

## 2023-10-24 DIAGNOSIS — R06.2 WHEEZING: ICD-10-CM

## 2023-10-24 PROCEDURE — 99215 OFFICE O/P EST HI 40 MIN: CPT | Performed by: PHYSICIAN ASSISTANT

## 2023-10-24 PROCEDURE — 3074F SYST BP LT 130 MM HG: CPT | Performed by: PHYSICIAN ASSISTANT

## 2023-10-24 PROCEDURE — 3008F BODY MASS INDEX DOCD: CPT | Performed by: PHYSICIAN ASSISTANT

## 2023-10-24 PROCEDURE — 3078F DIAST BP <80 MM HG: CPT | Performed by: PHYSICIAN ASSISTANT

## 2023-10-24 NOTE — PATIENT INSTRUCTIONS
Recommend cardiology evaluation for your shortness of breath. You can call [address] [place] [address] at [phone]. Increase total sleep time by 1-2 hours. Avoid naps. Absolutely NO driving it at all sleepy. I will review with one of the physicians and give you a call back with next steps/recommendations.

## 2023-10-25 ENCOUNTER — APPOINTMENT (OUTPATIENT)
Dept: WOUND CARE | Facility: HOSPITAL | Age: 48
End: 2023-10-25
Attending: NURSE PRACTITIONER
Payer: COMMERCIAL

## 2023-10-27 RX ORDER — FLUTICASONE FUROATE AND VILANTEROL 100; 25 UG/1; UG/1
1 POWDER RESPIRATORY (INHALATION) DAILY
Qty: 1 EACH | Refills: 0 | Status: SHIPPED | OUTPATIENT
Start: 2023-10-27

## 2023-10-27 RX ORDER — ZOLPIDEM TARTRATE 6.25 MG/1
6.25 TABLET, FILM COATED, EXTENDED RELEASE ORAL NIGHTLY PRN
Qty: 30 TABLET | Refills: 1 | Status: SHIPPED | OUTPATIENT
Start: 2023-10-27

## 2023-10-29 RX ORDER — MONTELUKAST SODIUM 10 MG/1
10 TABLET ORAL NIGHTLY
Qty: 90 TABLET | Refills: 1 | Status: SHIPPED | OUTPATIENT
Start: 2023-10-29

## 2023-10-29 NOTE — TELEPHONE ENCOUNTER
Refill passed per [facility] protocol.     Requested Prescriptions   Pending Prescriptions Disp Refills    MONTELUKAST 10 MG Oral Tab [Pharmacy Med Name: Montelukast Sodium 10 Mg Tab Auro] 90 tablet 0     Sig: Take 1 tablet (10 mg total) by mouth nightly.       Asthma & COPD Medication Protocol Passed - 10/28/2023  1:31 AM        Passed - In person appointment or virtual visit in the past 6 mos or appointment in next 3 mos     Recent Outpatient Visits              5 days ago Dyspnea on exertion    [facility] Max Cha PA-C    Office Visit    1 week ago Varicose veins of left lower extremity with complications    [facility] Thomas Saha APRN    Office Visit    2 weeks ago Varicose veins of left lower extremity with complications    [facility] Thomas Saha APRN    Office Visit    3 weeks ago Sleep concern    [facility]    Office Visit    1 month ago Dyslipidemia    [facility] Maribeth Mendoza APRN    Telemedicine          Future Appointments         Provider Department Appt Notes    [facility]     In 4 days Maribeth Mendoza APRN [facility]     In 6 days [facility] CARD RM3 [facility]ostics r/s from 10/10/23 [place]                            Recent Outpatient Visits              5 days ago Dyspnea on exertion    [facility] Max Cha PA-C    Office Visit    1 week ago Varicose veins of left lower extremity with complications    [facility] Thomas Saha APRN    Office Visit    2 weeks ago Varicose veins of left lower extremity with complications    [facility] Thomas Saha APRN     Office Visit    3 weeks ago Sleep concern    [facility]    Office Visit    1 month ago Dyslipidemia    [facility] Maribeth Mendoza APRN    Telemedicine             Future Appointments         Provider Department Appt Notes    [facility] [address], [place], [facility]-thru Facility     In 4 days Maribeth Mendoza APRN [facility], [facility]     In 6 days [facility] CARD RM3 ECHO [facility] Cardiodiagnostics r/s from 10/10/23 [place]

## 2023-11-03 ENCOUNTER — OFFICE VISIT (OUTPATIENT)
Dept: SURGERY | Facility: CLINIC | Age: 48
End: 2023-11-03
Payer: COMMERCIAL

## 2023-11-03 VITALS
SYSTOLIC BLOOD PRESSURE: 108 MMHG | HEIGHT: 66 IN | DIASTOLIC BLOOD PRESSURE: 62 MMHG | HEART RATE: 95 BPM | BODY MASS INDEX: 47.09 KG/M2 | WEIGHT: 293 LBS | OXYGEN SATURATION: 95 %

## 2023-11-03 DIAGNOSIS — I89.0 LYMPHEDEMA OF BOTH LOWER EXTREMITIES: ICD-10-CM

## 2023-11-03 DIAGNOSIS — Z90.3 HISTORY OF SLEEVE GASTRECTOMY: Primary | ICD-10-CM

## 2023-11-03 DIAGNOSIS — E78.5 DYSLIPIDEMIA: ICD-10-CM

## 2023-11-03 DIAGNOSIS — E66.01 MORBID OBESITY WITH BMI OF 60.0-69.9, ADULT (HCC): ICD-10-CM

## 2023-11-03 DIAGNOSIS — R63.2 BINGE EATING: ICD-10-CM

## 2023-11-03 DIAGNOSIS — F31.9 BIPOLAR AFFECTIVE DISORDER, REMISSION STATUS UNSPECIFIED (HCC): ICD-10-CM

## 2023-11-03 DIAGNOSIS — G89.29 OTHER CHRONIC PAIN: ICD-10-CM

## 2023-11-03 DIAGNOSIS — M19.90 ARTHRITIS: ICD-10-CM

## 2023-11-03 PROCEDURE — 99214 OFFICE O/P EST MOD 30 MIN: CPT | Performed by: NURSE PRACTITIONER

## 2023-11-03 PROCEDURE — 3008F BODY MASS INDEX DOCD: CPT | Performed by: NURSE PRACTITIONER

## 2023-11-03 PROCEDURE — 3078F DIAST BP <80 MM HG: CPT | Performed by: NURSE PRACTITIONER

## 2023-11-03 PROCEDURE — 3074F SYST BP LT 130 MM HG: CPT | Performed by: NURSE PRACTITIONER

## 2023-11-04 ENCOUNTER — HOSPITAL ENCOUNTER (OUTPATIENT)
Dept: CV DIAGNOSTICS | Facility: HOSPITAL | Age: 48
Discharge: HOME | End: 2023-11-04
Attending: PHYSICIAN ASSISTANT
Payer: COMMERCIAL

## 2023-11-04 DIAGNOSIS — R06.09 DYSPNEA ON EXERTION: ICD-10-CM

## 2023-11-04 PROCEDURE — 93306 TTE W/DOPPLER COMPLETE: CPT | Performed by: PHYSICIAN ASSISTANT

## 2023-11-27 DIAGNOSIS — E66.01 MORBID OBESITY WITH BMI OF 60.0-69.9, ADULT (HCC): ICD-10-CM

## 2023-11-27 RX ORDER — SEMAGLUTIDE 2.4 MG/.75ML
2.4 INJECTION, SOLUTION SUBCUTANEOUS WEEKLY
Qty: 9 ML | Refills: 0 | Status: SHIPPED | OUTPATIENT
Start: 2023-11-27

## 2024-02-14 ENCOUNTER — APPOINTMENT (OUTPATIENT)
Dept: WOUND CARE | Facility: HOSPITAL | Age: 49
End: 2024-02-14
Attending: NURSE PRACTITIONER
Payer: COMMERCIAL

## 2024-02-19 ENCOUNTER — OFFICE VISIT (OUTPATIENT)
Dept: INTERNAL MEDICINE CLINIC | Facility: CLINIC | Age: 49
End: 2024-02-19

## 2024-02-19 VITALS
BODY MASS INDEX: 47.09 KG/M2 | SYSTOLIC BLOOD PRESSURE: 127 MMHG | DIASTOLIC BLOOD PRESSURE: 70 MMHG | OXYGEN SATURATION: 96 % | HEART RATE: 72 BPM | HEIGHT: 66 IN | WEIGHT: 293 LBS

## 2024-02-19 DIAGNOSIS — R06.2 WHEEZING: Primary | ICD-10-CM

## 2024-02-19 DIAGNOSIS — R05.9 COUGH, UNSPECIFIED TYPE: ICD-10-CM

## 2024-02-19 DIAGNOSIS — Z12.31 ENCOUNTER FOR SCREENING MAMMOGRAM FOR MALIGNANT NEOPLASM OF BREAST: ICD-10-CM

## 2024-02-19 PROCEDURE — 3008F BODY MASS INDEX DOCD: CPT | Performed by: NURSE PRACTITIONER

## 2024-02-19 PROCEDURE — 3078F DIAST BP <80 MM HG: CPT | Performed by: NURSE PRACTITIONER

## 2024-02-19 PROCEDURE — 3074F SYST BP LT 130 MM HG: CPT | Performed by: NURSE PRACTITIONER

## 2024-02-19 PROCEDURE — 99214 OFFICE O/P EST MOD 30 MIN: CPT | Performed by: NURSE PRACTITIONER

## 2024-02-19 RX ORDER — ZALEPLON 5 MG/1
5 CAPSULE ORAL NIGHTLY
COMMUNITY
Start: 2024-02-07

## 2024-02-19 RX ORDER — OLANZAPINE AND FLUOXETINE 3; 25 MG/1; MG/1
1 CAPSULE ORAL DAILY
COMMUNITY
Start: 2024-02-07

## 2024-02-19 RX ORDER — AZITHROMYCIN 250 MG/1
TABLET, FILM COATED ORAL
Qty: 6 TABLET | Refills: 0 | Status: SHIPPED | OUTPATIENT
Start: 2024-02-19 | End: 2024-02-24

## 2024-02-19 RX ORDER — BENZONATATE 200 MG/1
200 CAPSULE ORAL 3 TIMES DAILY PRN
Qty: 30 CAPSULE | Refills: 0 | Status: SHIPPED | OUTPATIENT
Start: 2024-02-19

## 2024-02-19 RX ORDER — FUROSEMIDE 20 MG/1
20 TABLET ORAL DAILY
COMMUNITY
Start: 2024-02-04

## 2024-02-19 RX ORDER — PREDNISONE 20 MG/1
20 TABLET ORAL 2 TIMES DAILY
Qty: 10 TABLET | Refills: 0 | Status: SHIPPED | OUTPATIENT
Start: 2024-02-19 | End: 2024-02-24

## 2024-02-19 RX ORDER — PANCRELIPASE LIPASE, PANCRELIPASE PROTEASE, PANCRELIPASE AMYLASE 40000; 126000; 168000 [USP'U]/1; [USP'U]/1; [USP'U]/1
CAPSULE, DELAYED RELEASE ORAL
COMMUNITY

## 2024-02-19 NOTE — PROGRESS NOTES
Danika Sierra is a 48 year old female.  HPI:   Alex presents to clinic with complaint of cough and wheezing for the past 4 weeks.  Reports taking 2 COVID tests at home and both were negative.  She denies any nasal congestion sinus pressure or pain, runny nose, dizziness, headache, ear pain, fever or chills.  She reports cough is both dry and productive and feels that the wheezing is persisting.  She has albuterol at home but is not helping.  Currently denies any shortness of breath, chest pain, dizziness, palpitations, loss of taste or smell, appetite or weight changes.    Patient has a history of wheezing, SOB, difficulty falling asleep and staying asleep, and intermittent cough.  She was referred for pulmonary function tests which she completed in 2023 that showed moderate restrictive deficit with normal diffusing capacity.  Patient also was evaluated for GARETH but testing was negative.  She was referred to pulmonology and symptoms suspected to be related to morbid obesity.  She had tried albuterol in the past but it did not improve her symptoms.  Patient also completed ambulatory oximetry but did not qualify for O2.  She was advised a trial of ICS/LABA (Breo), continue albuterol as needed and was advised to continue her antihistamine and montelukast.  She does not use the Breo regularly, only about once or twice a month as needed.  She was advised to see cardiology and complete an echocardiogram.  Patient completed stress test with no evidence of ischemia . Suspected cardiac deconditioning and pulmonary artery hypertension.   Visit advised lifestyle changes, weight loss, continue low-dose diuretics and advised possible right heart catheterization if sx persisted.   Current Outpatient Medications   Medication Sig Dispense Refill    furosemide 20 MG Oral Tab Take 1 tablet (20 mg total) by mouth daily.      OLANZapine-FLUoxetine HCl 3-25 MG Oral Cap Take 1 capsule by mouth daily.      Pancrelipase,  Lip-Prot-Amyl, (ZENPEP) 95578-460209 units Oral Cap DR Particles       Zaleplon 5 MG Oral Cap Take 1 capsule (5 mg total) by mouth nightly. TAKE AT BEDTIME      predniSONE 20 MG Oral Tab Take 1 tablet (20 mg total) by mouth 2 (two) times daily for 5 days. 10 tablet 0    azithromycin (ZITHROMAX Z-ASUNCION) 250 MG Oral Tab Take 2 tablets (500 mg total) by mouth daily for 1 day, THEN 1 tablet (250 mg total) daily for 4 days. 6 tablet 0    benzonatate 200 MG Oral Cap Take 1 capsule (200 mg total) by mouth 3 (three) times daily as needed for cough. 30 capsule 0    semaglutide-weight management (WEGOVY) 2.4 MG/0.75ML Subcutaneous Solution Auto-injector Inject 0.75 mL (2.4 mg total) into the skin once a week. 9 mL 0    montelukast 10 MG Oral Tab Take 1 tablet (10 mg total) by mouth nightly. 90 tablet 1    fluticasone furoate-vilanterol (BREO ELLIPTA) 100-25 MCG/ACT Inhalation Aerosol Powder, Breath Activated Inhale 1 puff into the lungs daily. Rinse your mouth with water without swallowing after using BREO to help reduce your chance of getting thrush. 1 each 0    Zolpidem Tartrate ER 6.25 MG Oral Tab CR Take 1 tablet (6.25 mg total) by mouth nightly as needed for Sleep. 30 tablet 1    albuterol 108 (90 Base) MCG/ACT Inhalation Aero Soln Inhale 2 puffs into the lungs every 4 (four) hours as needed for Wheezing or Shortness of Breath. 18 g 5    fenofibrate 145 MG Oral Tab Take 1 tablet (145 mg total) by mouth daily.      topiramate 50 MG Oral Tab Take 1 tablet (50 mg total) by mouth in the morning, at noon, in the evening, and at bedtime.      ALPRAZolam 0.25 MG Oral Tab TAKE 1 OR 2 TABLETS BY MOUTH ONCE A DAY AT BEDTIME      Cholecalciferol 100 MCG (4000 UT) Oral Cap Vitamin D3 100 mcg (4,000 unit) capsule    125 micrograms every day by oral route.      cyclobenzaprine 10 MG Oral Tab       divalproex  MG Oral Tablet 24 Hr Take 1 tablet (500 mg total) by mouth daily.      gabapentin 600 MG Oral Tab Take 1 tablet (600 mg  total) by mouth in the morning, at noon, in the evening, and at bedtime.      oxyCODONE-acetaminophen  MG Oral Tab Take 1 tablet by mouth every 4 (four) hours as needed for Pain.      Multiple Vitamins-Minerals (MULTI-VITAMIN/MINERALS) Oral Tab Take 1 tablet by mouth daily.      loratadine 10 MG Oral Tab Take 1 tablet (10 mg total) by mouth daily.      DULoxetine 30 MG Oral Cap DR Particles Take 1 capsule (30 mg total) by mouth 2 (two) times daily. 180 capsule 1      Past Medical History:   Diagnosis Date    ACNE     ANXIETY     Asthma     Back problem     Bipolar affective disorder ([facility])     Depression     Esophageal reflux     Fibromyalgia     Hx of motion sickness     IBS (irritable bowel syndrome)     Migraines     Morbid obesity with BMI of 50.0-59.9, adult ([facility])     Obstructive sleep apnea (adult) (pediatric) 2011     AHI 11 autoPAP 6-16 [facility]    PONV (postoperative nausea and vomiting)     Sleep apnea     Visual impairment     glasses      Social History:  Social History     Socioeconomic History    Marital status:     Number of children: 0    Years of education: 16   Occupational History    Occupation: Accounting   Tobacco Use    Smoking status: Never    Smokeless tobacco: Never   Vaping Use    Vaping Use: Never used   Substance and Sexual Activity    Alcohol use: Yes     Alcohol/week: 0.0 - 2.0 standard drinks of alcohol     Comment: social    Drug use: No    Sexual activity: Not Currently     Partners: Male   Other Topics Concern     Service No    Blood Transfusions No    Caffeine Concern No     Comment: 3-4 soda/ day    Occupational Exposure No    Hobby Hazards No    Sleep Concern Yes     Comment: Poor Sleep    Stress Concern No    Weight Concern No    Special Diet No    Back Care No    Exercise Yes     Comment: 1-2 x/ week, walking    Bike Helmet No    Seat Belt Yes    Self-Exams No        REVIEW OF SYSTEMS:   Review of Systems   All other systems reviewed and are negative.          EXAM:   /70 (BP Location: Right arm, Patient Position: Sitting, Cuff Size: large)   Pulse 72   Ht 5' 6\" (1.676 m)   Wt (!) 390 lb (176.9 kg)   LMP 05/01/2017 (LMP Unknown)   SpO2 96%   BMI 62.95 kg/m²     Physical Exam  Vitals reviewed.   Constitutional:       General: She is not in acute distress.     Appearance: Normal appearance. She is obese. She is not ill-appearing.   HENT:      Head: Normocephalic and atraumatic.      Right Ear: Tympanic membrane, ear canal and external ear normal.      Left Ear: Tympanic membrane, ear canal and external ear normal.      Nose: Nose normal.      Mouth/Throat:      Pharynx: Oropharynx is clear. No oropharyngeal exudate or posterior oropharyngeal erythema.   Eyes:      General: No scleral icterus.        Right eye: No discharge.         Left eye: No discharge.      Extraocular Movements: Extraocular movements intact.      Conjunctiva/sclera: Conjunctivae normal.      Pupils: Pupils are equal, round, and reactive to light.   Neck:      Thyroid: No thyroid mass or thyromegaly.   Cardiovascular:      Rate and Rhythm: Normal rate and regular rhythm.      Pulses: Normal pulses.      Heart sounds: Normal heart sounds.   Pulmonary:      Effort: Pulmonary effort is normal. No respiratory distress.      Breath sounds: Normal breath sounds. No stridor. No wheezing or rhonchi.   Chest:      Chest wall: No tenderness.   Musculoskeletal:         General: Normal range of motion.      Cervical back: Normal range of motion and neck supple.      Right lower leg: No edema.      Left lower leg: No edema.   Lymphadenopathy:      Cervical: No cervical adenopathy.   Skin:     General: Skin is warm and dry.      Coloration: Skin is not jaundiced.   Neurological:      General: No focal deficit present.      Mental Status: She is alert and oriented to person, place, and time.      Motor: Motor function is intact.      Gait: Gait normal.   Psychiatric:         Mood and Affect: Mood  normal.         Judgment: Judgment normal.            ASSESSMENT AND PLAN:   1. Wheezing  -advised to restart Breo and use as prescribed. Albuterol PRN.  -Start steroid burst.   -Keep appt with pulmonology in one week.   -Reviewed alarm signs.   - predniSONE 20 MG Oral Tab; Take 1 tablet (20 mg total) by mouth 2 (two) times daily for 5 days.  Dispense: 10 tablet; Refill: 0    2. Cough, unspecified type  -Start steroid burst and Zpak. Reviewed dose and s/e.   -Nasal saline 2 sprays in each nostril every 3-4 hours as needed.   Hydrate, humidifier, rest  - predniSONE 20 MG Oral Tab; Take 1 tablet (20 mg total) by mouth 2 (two) times daily for 5 days.  Dispense: 10 tablet; Refill: 0  - azithromycin (ZITHROMAX Z-ASUNCION) 250 MG Oral Tab; Take 2 tablets (500 mg total) by mouth daily for 1 day, THEN 1 tablet (250 mg total) daily for 4 days.  Dispense: 6 tablet; Refill: 0  - benzonatate 200 MG Oral Cap; Take 1 capsule (200 mg total) by mouth 3 (three) times daily as needed for cough.  Dispense: 30 capsule; Refill: 0    3. Encounter for screening mammogram for malignant neoplasm of breast  - [facility] UNRULY 2D+3D SCREENING BILAT (CPT=77067/07181); Future       The patient indicates understanding of these issues and agrees to the plan.  The patient is asked to return in 5-7 days/PRN.     The above note was creating using Dragon speech recognition technology. Please excuse any typos.

## 2024-02-27 ENCOUNTER — TELEPHONE (OUTPATIENT)
Dept: PULMONOLOGY | Facility: CLINIC | Age: 49
End: 2024-02-27

## 2024-02-27 ENCOUNTER — OFFICE VISIT (OUTPATIENT)
Dept: PULMONOLOGY | Facility: CLINIC | Age: 49
End: 2024-02-27

## 2024-02-27 VITALS
DIASTOLIC BLOOD PRESSURE: 62 MMHG | HEART RATE: 71 BPM | SYSTOLIC BLOOD PRESSURE: 106 MMHG | BODY MASS INDEX: 44.41 KG/M2 | WEIGHT: 293 LBS | OXYGEN SATURATION: 94 % | HEIGHT: 68 IN

## 2024-02-27 DIAGNOSIS — R94.2 RESTRICTIVE VENTILATORY DEFECT: ICD-10-CM

## 2024-02-27 DIAGNOSIS — I27.20 PULMONARY HYPERTENSION (HCC): Primary | ICD-10-CM

## 2024-02-27 DIAGNOSIS — R06.2 WHEEZING: ICD-10-CM

## 2024-02-27 DIAGNOSIS — R06.09 DYSPNEA ON EXERTION: ICD-10-CM

## 2024-02-27 DIAGNOSIS — J30.9 ALLERGIC RHINITIS WITH POSTNASAL DRIP: ICD-10-CM

## 2024-02-27 DIAGNOSIS — R09.82 ALLERGIC RHINITIS WITH POSTNASAL DRIP: ICD-10-CM

## 2024-02-27 PROCEDURE — 3078F DIAST BP <80 MM HG: CPT | Performed by: PHYSICIAN ASSISTANT

## 2024-02-27 PROCEDURE — 3074F SYST BP LT 130 MM HG: CPT | Performed by: PHYSICIAN ASSISTANT

## 2024-02-27 PROCEDURE — 3008F BODY MASS INDEX DOCD: CPT | Performed by: PHYSICIAN ASSISTANT

## 2024-02-27 PROCEDURE — 99214 OFFICE O/P EST MOD 30 MIN: CPT | Performed by: PHYSICIAN ASSISTANT

## 2024-02-27 RX ORDER — OMEPRAZOLE 40 MG/1
40 CAPSULE, DELAYED RELEASE ORAL DAILY
COMMUNITY

## 2024-02-27 RX ORDER — FLUTICASONE PROPIONATE 50 MCG
1 SPRAY, SUSPENSION (ML) NASAL 2 TIMES DAILY
Qty: 48 G | Refills: 0 | Status: SHIPPED
Start: 2024-02-27 | End: 2024-03-28

## 2024-02-27 NOTE — PATIENT INSTRUCTIONS
Start Flonase 1 spray each nostril twice daily.  Resume Breo 1 puff daily. Rinse mouth, gargle, and spit to follow. This is very important to take once every day.  I will review with one of the pulmonologists and call you back with recommendations.  Follow up with cardiologist.

## 2024-02-27 NOTE — TELEPHONE ENCOUNTER
Max printed our prescription for fluticasone propionate. Faxed to [facility] on file. Received fax confirmation.

## 2024-02-27 NOTE — PROGRESS NOTES
Pulmonary Progress Note    History of Present Illness:  Danika Sierra is a 48 year old female presenting to pulmonary clinic today for follow up. Last seen 10/2023.     Patient continues to have chronic cough and wheezing. Cough worse in the last 1 month with frequent throat clearing, feels \"junk\" in her throat, and nasal congestion. No significant phlegm. Acid reflux is well controlled on daily PPI. Wheezing is worse at night. She has dyspnea on exertion which is stable. Albuterol does not help. She was prescribed z-sia and prednisone last week with no improvement. She was prescribed Breo but is not taking it daily. Prior PFTs with moderate restrictive deficit, likely c/w morbid obesity. Chest x-ray  unremarkable, no significant scarring or fibrosis. She saw cardiology and had nuclear stress test which was negative for ischemia. No history of venous thromboembolism. She was started on Lasix 20 mg daily and leg swelling is slightly improved.    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 No weight loss or weight gain.  HEENT: +Nasal congestion. +Postnasal drainage.  Cardio: +Orthopnea. No chest pain.  Respiratory: See HPI.  GI: +Acid reflux (well controlled on PPI).  Extremities: +Chronic leg swelling. No pain.  Neurologic: No headache.  Psych: +Depression.     Physical Exam:  /62   Pulse 71   Ht 5' 8\" (1.727 m)   Wt (!) 390 lb (176.9 kg)   LMP  05/01/2017 (LMP Unknown)   SpO2 94%   BMI 59.30 kg/m²    Constitutional: Morbidly obese. No acute distress.   HEENT: Head NC/AT. PEERL. Crowded oropharynx. Mallampati class 4.  Cardio: Regular rate and rhythm. Normal S1 and S2. No murmurs.   Respiratory: Thorax symmetrical with no labored breathing. Clear to ausculation bilaterally with symmetrical breath sounds. No wheezing, rhonchi, or crackles.   Extremities: No clubbing or cyanosis. +Bilateral LE edema. No calf tenderness.  Neurologic: A&Ox3. No gross motor deficits.  Skin: Warm, dry.  Psych: Calm, cooperative. Pleasant affect.    Results:  -Chest x-ray 8/2023: Cardiomediastinal silhouette is upper limits of normal for size. Calcific atherosclerosis of aorta. No acute cardiopulmonary disease.      -PFTs 9/2023: Moderate restrictive ventilatory deficit. Mild reduction in lung volumes. Normal DLCO.     -Diagnostic polysomnography 10/2023: No sleep disordered breathing. No PLMs.    -Nuclear PET 12/2023: Stress EKG normal. Equivocal perfusion study. Scan suggestive of low risk for future CV events. LV cavity mildly enlarged on stress studies and calculated to be 68%. Rest LV cavity mildly enlarged and calculated to be 69%. Poor quality small size inferior apical defect can not be rules out.    Assessment/Plan:  Dyspnea, wheezing  Possible asthma, however, no clear improvement with albuterol or prednisone. Previously recommended trial of ICS/LABA but she is not taking consistently. Again encouraged patient to try Breo for the next month and follow up with update.  Plan:  -Trial of ICS/LABA (Breo)  -Continue montelukast  -Albuterol MDI as needed    Allergic rhinitis with postnasal drainage  Plan:  -Flonase    Restrictive ventilatory deficit  Due to obesity. No parenchymal lung disease. DLCO normal.  Plan:  -Weight loss, follows with bariatrics    Pulmonary hypertension  Moderate on Echocardiogram. Follows with cardiology. No parenchymal lung disease. No GARETH on  recent sleep study. May need RHC.  Plan:  -VQ scan to evaluate for chronic PE  -Follow up cardiology    Case d/w Dr. Woodard.    Max Cha PA-C  Pulmonary Medicine  2/27/2024

## 2024-03-12 NOTE — TELEPHONE ENCOUNTER
montelukast 10 MG Oral Tab, Take 1 tablet (10 mg total) by mouth nightly., Disp: 90 tablet, Rfl: 1

## 2024-03-14 RX ORDER — MONTELUKAST SODIUM 10 MG/1
10 TABLET ORAL NIGHTLY
Qty: 90 TABLET | Refills: 1 | Status: SHIPPED | OUTPATIENT
Start: 2024-03-14

## 2024-03-14 NOTE — TELEPHONE ENCOUNTER
Please review; protocol failed/ has no protocol    Requested Prescriptions   Pending Prescriptions Disp Refills    montelukast 10 MG Oral Tab 90 tablet 1     Sig: Take 1 tablet (10 mg total) by mouth nightly.       Asthma & COPD Medication Protocol Failed - 3/14/2024 10:52 AM        Failed - Asthma Action Score greater than or equal to 20        Failed - AAP/ACT given in last 12 months     No data recorded  No data recorded  No data recorded  No data recorded          Passed - Appointment in past 6 or next 3 months      Recent Outpatient Visits              2 weeks ago Pulmonary [facility] (HCC)    [facility]    Office Visit    3 weeks ago Wheezing    [facility] [address], [place] Caridad Walters APRN    Office Visit    4 months ago History of sleeve gastrectomy    [facility], aMribeth Connor APRN    Office Visit    4 months ago Dyspnea on exertion    [facility]    Office Visit    5 months ago Varicose veins of left lower extremity with complications    [facility] TayyeThomas woo, APRMARIA LUISA    Office Visit          Future Appointments         Provider Department Appt Notes    In 2 weeks LMB DEXA RM1; L[facility] YOSSI RM1 [facility] [place]                   Recent Outpatient Visits              2 weeks ago Pulmonary hypertension (HCC)    [facility]    Office Visit    3 weeks ago Wheezing    [facility] [address], [place] Caridad Walters APRN    Office Visit    4 months ago History of sleeve gastrectomy    [facility], Maribeth Connor APRN    Office Visit    4 months ago Dyspnea on exertion    [facility],  JENNIFER Santana    Office Visit    5 months ago Varicose veins of left lower extremity with complications    [facility] Thomas Saha APRN    Office Visit          Future Appointments         Provider Department Appt Notes    In 2 weeks LMGENESIS BEJARANO RM1; PRABHU SY RM1 [facility] [place]

## 2024-03-18 ENCOUNTER — TELEPHONE (OUTPATIENT)
Dept: ORTHOPEDICS CLINIC | Facility: CLINIC | Age: 49
End: 2024-03-18

## 2024-03-18 DIAGNOSIS — M25.511 RIGHT SHOULDER PAIN, UNSPECIFIED CHRONICITY: Primary | ICD-10-CM

## 2024-03-18 RX ORDER — FLUTICASONE PROPIONATE 50 MCG
1 SPRAY, SUSPENSION (ML) NASAL 2 TIMES DAILY
Qty: 48 G | Refills: 1 | Status: SHIPPED | OUTPATIENT
Start: 2024-03-18 | End: 2024-04-17

## 2024-03-18 NOTE — TELEPHONE ENCOUNTER
Pharmacy requesting refill of       fluticasone propionate 50 MCG/ACT Nasal Suspension, 1 spray by Nasal route 2 (two) times daily., Disp: 48 g, Rfl: 0

## 2024-03-18 NOTE — TELEPHONE ENCOUNTER
Last office visit: 2/27/24  Upcoming appointment: No upcoming appointment scheduled  Last refill: 2/27/24 faxed to [facility], but requesting refill from [facility].     Max RODRIGUEZ: Please review/sign pended refill if agreeable.

## 2024-05-20 ENCOUNTER — OFFICE VISIT (OUTPATIENT)
Dept: INTERNAL MEDICINE CLINIC | Facility: CLINIC | Age: 49
End: 2024-05-20

## 2024-05-20 VITALS
HEART RATE: 90 BPM | WEIGHT: 293 LBS | HEIGHT: 68 IN | BODY MASS INDEX: 44.41 KG/M2 | SYSTOLIC BLOOD PRESSURE: 122 MMHG | DIASTOLIC BLOOD PRESSURE: 79 MMHG

## 2024-05-20 DIAGNOSIS — Z13.21 ENCOUNTER FOR VITAMIN DEFICIENCY SCREENING: ICD-10-CM

## 2024-05-20 DIAGNOSIS — Z00.00 WELLNESS EXAMINATION: Primary | ICD-10-CM

## 2024-05-20 DIAGNOSIS — Z12.31 ENCOUNTER FOR SCREENING MAMMOGRAM FOR MALIGNANT NEOPLASM OF BREAST: ICD-10-CM

## 2024-05-20 DIAGNOSIS — I10 PRIMARY HYPERTENSION: ICD-10-CM

## 2024-05-20 DIAGNOSIS — E78.5 DYSLIPIDEMIA: ICD-10-CM

## 2024-05-20 DIAGNOSIS — Z13.0 SCREENING FOR DEFICIENCY ANEMIA: ICD-10-CM

## 2024-05-20 PROCEDURE — 99396 PREV VISIT EST AGE 40-64: CPT | Performed by: NURSE PRACTITIONER

## 2024-05-20 PROCEDURE — 3008F BODY MASS INDEX DOCD: CPT | Performed by: NURSE PRACTITIONER

## 2024-05-20 PROCEDURE — 3074F SYST BP LT 130 MM HG: CPT | Performed by: NURSE PRACTITIONER

## 2024-05-20 PROCEDURE — 3078F DIAST BP <80 MM HG: CPT | Performed by: NURSE PRACTITIONER

## 2024-05-20 RX ORDER — GALCANEZUMAB 120 MG/ML
INJECTION, SOLUTION SUBCUTANEOUS
COMMUNITY
Start: 2024-05-07

## 2024-05-20 NOTE — PROGRESS NOTES
Danika Sierra is a 48 year old female.  HPI:   PT presents to clinic for annual physical . Reports doing well, denies any complaints at this time. Has a follow up this year with weight loss clinic to resume tx. Hx of sleeve gastrectomy. Has pending labs. X of IBS, currently stable.   Current Outpatient Medications   Medication Sig Dispense Refill    EMGALITY 120 MG/ML Subcutaneous Solution Auto-injector INJECT 1 AUTO INJECTOR ONCE MONTHLY AS DIRECTED      montelukast 10 MG Oral Tab Take 1 tablet (10 mg total) by mouth nightly. 90 tablet 1    Omeprazole 40 MG Oral Capsule Delayed Release Take 1 capsule (40 mg total) by mouth daily.      furosemide 20 MG Oral Tab Take 1 tablet (20 mg total) by mouth daily.      OLANZapine-FLUoxetine HCl 3-25 MG Oral Cap Take 1 capsule by mouth daily.      Pancrelipase, Lip-Prot-Amyl, (ZENPEP) 04702-155406 units Oral Cap DR Particles       Zaleplon 5 MG Oral Cap Take 1 capsule (5 mg total) by mouth nightly. TAKE AT BEDTIME      benzonatate 200 MG Oral Cap Take 1 capsule (200 mg total) by mouth 3 (three) times daily as needed for cough. 30 capsule 0    semaglutide-weight management (WEGOVY) 2.4 MG/0.75ML Subcutaneous Solution Auto-injector Inject 0.75 mL (2.4 mg total) into the skin once a week. 9 mL 0    fluticasone furoate-vilanterol (BREO ELLIPTA) 100-25 MCG/ACT Inhalation Aerosol Powder, Breath Activated Inhale 1 puff into the lungs daily. Rinse your mouth with water without swallowing after using BREO to help reduce your chance of getting thrush. 1 each 0    Zolpidem Tartrate ER 6.25 MG Oral Tab CR Take 1 tablet (6.25 mg total) by mouth nightly as needed for Sleep. 30 tablet 1    albuterol 108 (90 Base) MCG/ACT Inhalation Aero Soln Inhale 2 puffs into the lungs every 4 (four) hours as needed for Wheezing or Shortness of Breath. 18 g 5    fenofibrate 145 MG Oral Tab Take 1 tablet (145 mg total) by mouth daily.      topiramate 50 MG Oral Tab Take 1 tablet (50 mg total) by  mouth in the morning, at noon, in the evening, and at bedtime.      ALPRAZolam 0.25 MG Oral Tab TAKE 1 OR 2 TABLETS BY MOUTH ONCE A DAY AT BEDTIME      Cholecalciferol 100 MCG (4000 UT) Oral Cap Vitamin D3 100 mcg (4,000 unit) capsule    125 micrograms every day by oral route.      cyclobenzaprine 10 MG Oral Tab       divalproex  MG Oral Tablet 24 Hr Take 1 tablet (500 mg total) by mouth daily.      gabapentin 600 MG Oral Tab Take 1 tablet (600 mg total) by mouth in the morning, at noon, in the evening, and at bedtime.      oxyCODONE-acetaminophen  MG Oral Tab Take 1 tablet by mouth every 4 (four) hours as needed for Pain.      Multiple Vitamins-Minerals (MULTI-VITAMIN/MINERALS) Oral Tab Take 1 tablet by mouth daily.      loratadine 10 MG Oral Tab Take 1 tablet (10 mg total) by mouth daily.      DULoxetine 30 MG Oral Cap DR Particles Take 1 capsule (30 mg total) by mouth 2 (two) times daily. 180 capsule 1      Past Medical History:    ACNE    ANXIETY    Asthma ([facility])    Back problem    Bipolar affective disorder ([facility])    Depression    Esophageal reflux    Fibromyalgia    Hx of motion sickness    IBS (irritable bowel syndrome)    Migraines    Morbid obesity with BMI of 50.0-59.9, adult ([facility])    Obstructive sleep apnea (adult) (pediatric)    AHI 11 autoPAP 6-16 [facility]    PONV (postoperative nausea and vomiting)    Sleep apnea    Visual impairment    glasses      Social History:  Social History     Socioeconomic History    Marital status:     Number of children: 0    Years of education: 16   Occupational History    Occupation: Accounting   Tobacco Use    Smoking status: Never    Smokeless tobacco: Never   Vaping Use    Vaping status: Never Used   Substance and Sexual Activity    Alcohol use: Yes     Alcohol/week: 0.0 - 2.0 standard drinks of alcohol     Comment: social    Drug use: No    Sexual activity: Not Currently     Partners: Male   Other Topics Concern     Service No    Blood  Transfusions No    Caffeine Concern No     Comment: 3-4 soda/ day    Occupational Exposure No    Hobby Hazards No    Sleep Concern Yes     Comment: Poor Sleep    Stress Concern No    Weight Concern No    Special Diet No    Back Care No    Exercise Yes     Comment: 1-2 x/ week, walking    Bike Helmet No    Seat Belt Yes    Self-Exams No        REVIEW OF SYSTEMS:   Review of Systems   Constitutional:  Negative for activity change, appetite change, fatigue, fever and unexpected weight change.   HENT:  Negative for congestion, dental problem and sore throat.    Eyes:  Negative for visual disturbance.   Respiratory:  Negative for cough, chest tightness, shortness of breath and wheezing.    Cardiovascular:  Negative for chest pain, palpitations and leg swelling.   Gastrointestinal:  Negative for abdominal pain, constipation, diarrhea, nausea and vomiting.   Endocrine: Negative.    Genitourinary:  Negative for difficulty urinating, frequency and menstrual problem.   Musculoskeletal:  Negative for arthralgias and back pain.   Skin:  Negative for color change, pallor, rash and wound.   Neurological:  Negative for dizziness, seizures, light-headedness, numbness and headaches.   Psychiatric/Behavioral:  Negative for behavioral problems, dysphoric mood and suicidal ideas. The patient is not nervous/anxious.           EXAM:   /79 (BP Location: Radial, Patient Position: Sitting, Cuff Size: adult)   Pulse 90   Ht 5' 8\" (1.727 m)   Wt (!) 395 lb (179.2 kg)   LMP 05/01/2017 (LMP Unknown)   BMI 60.06 kg/m²     Physical Exam  Vitals reviewed.   Constitutional:       General: She is not in acute distress.     Appearance: Normal appearance. She is obese. She is not ill-appearing.   HENT:      Head: Normocephalic and atraumatic.      Right Ear: Tympanic membrane, ear canal and external ear normal.      Left Ear: Tympanic membrane, ear canal and external ear normal.      Nose: Nose normal.      Mouth/Throat:      Pharynx:  Oropharynx is clear.   Eyes:      General: No scleral icterus.        Right eye: No discharge.         Left eye: No discharge.      Extraocular Movements: Extraocular movements intact.      Conjunctiva/sclera: Conjunctivae normal.      Pupils: Pupils are equal, round, and reactive to light.   Neck:      Thyroid: No thyroid mass or thyromegaly.   Cardiovascular:      Rate and Rhythm: Normal rate and regular rhythm.      Pulses: Normal pulses.      Heart sounds: Normal heart sounds.   Pulmonary:      Effort: Pulmonary effort is normal. No respiratory distress.      Breath sounds: Normal breath sounds.   Abdominal:      General: Abdomen is flat. Bowel sounds are normal. There is no distension.      Palpations: Abdomen is soft. There is no mass.      Tenderness: There is no abdominal tenderness.   Musculoskeletal:         General: Normal range of motion.      Cervical back: Normal range of motion and neck supple.      Right lower leg: No edema.      Left lower leg: No edema.   Lymphadenopathy:      Cervical: No cervical adenopathy.   Skin:     General: Skin is warm and dry.      Coloration: Skin is not jaundiced.      Comments: Xerosis lower extremities.    Neurological:      General: No focal deficit present.      Mental Status: She is alert and oriented to person, place, and time.      Motor: Motor function is intact.      Gait: Gait normal.   Psychiatric:         Mood and Affect: Mood normal.         Judgment: Judgment normal.            ASSESSMENT AND PLAN:   1. Wellness examination  Education provided on healthy lifestyle.  Diet: reduce saturated fats, simple carbs and excess sugars. Hydrate. Leafy greens, legumes, nuts/seeds, healthy sources of Omega 3, lean proteins, complex carbs, berries.   Exercise 30 min daily cardio as tolerated.   Immunizations reviewed and recommendations provided  Preventative health screening recommendations reviewed.   Previous lab and imaging results reviewed.   Pt following with  weight loss clinic.   - CBC With Differential With Platelet; Future  - Vitamin D, 25-Hydroxy; Future    2. Primary hypertension  Not taking any medications currently, stable.   - Lipid Panel [E]; Future    3. Dyslipidemia  - Lipid Panel [E]; Future  - TSH W Reflex To Free T4 [E]; Future    4. Encounter for vitamin deficiency screening  - Vitamin D, 25-Hydroxy; Future    5. Screening for deficiency anemia  - CBC With Differential With Platelet; Future    6. Encounter for screening mammogram for malignant neoplasm of breast  - [facility] UNRULY 2D+3D SCREENING BILAT (CPT=77067/78503); Future       The patient indicates understanding of these issues and agrees to the plan.  The patient is asked to return in 4 months.     The above note was creating using Dragon speech recognition technology. Please excuse any typos.

## 2024-06-01 ENCOUNTER — LAB ENCOUNTER (OUTPATIENT)
Dept: LAB | Age: 49
End: 2024-06-01
Attending: NURSE PRACTITIONER
Payer: COMMERCIAL

## 2024-06-01 DIAGNOSIS — E66.01 MORBID OBESITY WITH BMI OF 60.0-69.9, ADULT (HCC): ICD-10-CM

## 2024-06-01 DIAGNOSIS — Z00.00 WELLNESS EXAMINATION: ICD-10-CM

## 2024-06-01 DIAGNOSIS — Z13.0 SCREENING FOR DEFICIENCY ANEMIA: ICD-10-CM

## 2024-06-01 DIAGNOSIS — I89.0 LYMPHEDEMA OF BOTH LOWER EXTREMITIES: ICD-10-CM

## 2024-06-01 DIAGNOSIS — R63.2 BINGE EATING: ICD-10-CM

## 2024-06-01 DIAGNOSIS — Z90.3 HISTORY OF SLEEVE GASTRECTOMY: ICD-10-CM

## 2024-06-01 DIAGNOSIS — I10 PRIMARY HYPERTENSION: ICD-10-CM

## 2024-06-01 DIAGNOSIS — Z13.21 ENCOUNTER FOR VITAMIN DEFICIENCY SCREENING: ICD-10-CM

## 2024-06-01 DIAGNOSIS — E78.5 DYSLIPIDEMIA: ICD-10-CM

## 2024-06-01 DIAGNOSIS — F31.9 BIPOLAR AFFECTIVE DISORDER, REMISSION STATUS UNSPECIFIED (HCC): ICD-10-CM

## 2024-06-01 DIAGNOSIS — M19.90 ARTHRITIS: ICD-10-CM

## 2024-06-01 LAB
ALBUMIN SERPL-MCNC: 4.4 G/DL (ref 3.2–4.8)
ALBUMIN/GLOB SERPL: 1.3 {RATIO} (ref 1–2)
ALP LIVER SERPL-CCNC: 103 U/L
ALT SERPL-CCNC: 39 U/L
ANION GAP SERPL CALC-SCNC: 7 MMOL/L (ref 0–18)
AST SERPL-CCNC: 40 U/L (ref ?–34)
BASOPHILS # BLD AUTO: 0.1 X10(3) UL (ref 0–0.2)
BASOPHILS NFR BLD AUTO: 1.8 %
BILIRUB SERPL-MCNC: 0.4 MG/DL (ref 0.3–1.2)
BUN BLD-MCNC: 9 MG/DL (ref 9–23)
BUN/CREAT SERPL: 11.7 (ref 10–20)
CALCIUM BLD-MCNC: 9.4 MG/DL (ref 8.7–10.4)
CHLORIDE SERPL-SCNC: 108 MMOL/L (ref 98–112)
CHOLEST SERPL-MCNC: 217 MG/DL (ref ?–200)
CO2 SERPL-SCNC: 25 MMOL/L (ref 21–32)
CREAT BLD-MCNC: 0.77 MG/DL
DEPRECATED HBV CORE AB SER IA-ACNC: 107.5 NG/ML
DEPRECATED RDW RBC AUTO: 42.4 FL (ref 35.1–46.3)
EGFRCR SERPLBLD CKD-EPI 2021: 95 ML/MIN/1.73M2 (ref 60–?)
EOSINOPHIL # BLD AUTO: 0.23 X10(3) UL (ref 0–0.7)
EOSINOPHIL NFR BLD AUTO: 4.1 %
ERYTHROCYTE [DISTWIDTH] IN BLOOD BY AUTOMATED COUNT: 13.5 % (ref 11–15)
EST. AVERAGE GLUCOSE BLD GHB EST-MCNC: 126 MG/DL (ref 68–126)
FASTING PATIENT LIPID ANSWER: YES
FASTING STATUS PATIENT QL REPORTED: YES
FOLATE SERPL-MCNC: 9.5 NG/ML (ref 5.4–?)
GLOBULIN PLAS-MCNC: 3.4 G/DL (ref 2–3.5)
GLUCOSE BLD-MCNC: 106 MG/DL (ref 70–99)
HBA1C MFR BLD: 6 % (ref ?–5.7)
HCT VFR BLD AUTO: 44.1 %
HDLC SERPL-MCNC: 44 MG/DL (ref 40–59)
HGB BLD-MCNC: 14.6 G/DL
IMM GRANULOCYTES # BLD AUTO: 0.01 X10(3) UL (ref 0–1)
IMM GRANULOCYTES NFR BLD: 0.2 %
IRON SATN MFR SERPL: 25 %
IRON SERPL-MCNC: 77 UG/DL
LDLC SERPL CALC-MCNC: 144 MG/DL (ref ?–100)
LYMPHOCYTES # BLD AUTO: 1.74 X10(3) UL (ref 1–4)
LYMPHOCYTES NFR BLD AUTO: 31.2 %
MCH RBC QN AUTO: 28.2 PG (ref 26–34)
MCHC RBC AUTO-ENTMCNC: 33.1 G/DL (ref 31–37)
MCV RBC AUTO: 85.3 FL
MONOCYTES # BLD AUTO: 0.36 X10(3) UL (ref 0.1–1)
MONOCYTES NFR BLD AUTO: 6.5 %
NEUTROPHILS # BLD AUTO: 3.13 X10 (3) UL (ref 1.5–7.7)
NEUTROPHILS # BLD AUTO: 3.13 X10(3) UL (ref 1.5–7.7)
NEUTROPHILS NFR BLD AUTO: 56.2 %
NONHDLC SERPL-MCNC: 173 MG/DL (ref ?–130)
OSMOLALITY SERPL CALC.SUM OF ELEC: 289 MOSM/KG (ref 275–295)
PLATELET # BLD AUTO: 267 10(3)UL (ref 150–450)
POTASSIUM SERPL-SCNC: 4 MMOL/L (ref 3.5–5.1)
PROT SERPL-MCNC: 7.8 G/DL (ref 5.7–8.2)
RBC # BLD AUTO: 5.17 X10(6)UL
SODIUM SERPL-SCNC: 140 MMOL/L (ref 136–145)
TIBC SERPL-MCNC: 305 UG/DL (ref 250–425)
TRANSFERRIN SERPL-MCNC: 205 MG/DL (ref 250–380)
TRIGL SERPL-MCNC: 160 MG/DL (ref 30–149)
TSI SER-ACNC: 1.47 MIU/ML (ref 0.55–4.78)
VIT D+METAB SERPL-MCNC: 54 NG/ML (ref 30–100)
VLDLC SERPL CALC-MCNC: 30 MG/DL (ref 0–30)
WBC # BLD AUTO: 5.6 X10(3) UL (ref 4–11)

## 2024-06-01 PROCEDURE — 84425 ASSAY OF VITAMIN B-1: CPT | Performed by: NURSE PRACTITIONER

## 2024-06-01 PROCEDURE — 84466 ASSAY OF TRANSFERRIN: CPT | Performed by: NURSE PRACTITIONER

## 2024-06-01 PROCEDURE — 83036 HEMOGLOBIN GLYCOSYLATED A1C: CPT | Performed by: NURSE PRACTITIONER

## 2024-06-01 PROCEDURE — 82746 ASSAY OF FOLIC ACID SERUM: CPT | Performed by: NURSE PRACTITIONER

## 2024-06-01 PROCEDURE — 82728 ASSAY OF FERRITIN: CPT | Performed by: NURSE PRACTITIONER

## 2024-06-01 PROCEDURE — 80053 COMPREHEN METABOLIC PANEL: CPT | Performed by: NURSE PRACTITIONER

## 2024-06-01 PROCEDURE — 83540 ASSAY OF IRON: CPT | Performed by: NURSE PRACTITIONER

## 2024-06-03 ENCOUNTER — OFFICE VISIT (OUTPATIENT)
Dept: SURGERY | Facility: CLINIC | Age: 49
End: 2024-06-03
Payer: COMMERCIAL

## 2024-06-03 ENCOUNTER — HOSPITAL ENCOUNTER (OUTPATIENT)
Dept: GENERAL RADIOLOGY | Age: 49
Discharge: HOME | End: 2024-06-03
Attending: PHYSICIAN ASSISTANT
Payer: COMMERCIAL

## 2024-06-03 VITALS
BODY MASS INDEX: 47.09 KG/M2 | HEART RATE: 104 BPM | OXYGEN SATURATION: 95 % | WEIGHT: 293 LBS | DIASTOLIC BLOOD PRESSURE: 82 MMHG | SYSTOLIC BLOOD PRESSURE: 126 MMHG | HEIGHT: 66 IN

## 2024-06-03 DIAGNOSIS — R73.03 PREDIABETES: ICD-10-CM

## 2024-06-03 DIAGNOSIS — G89.29 OTHER CHRONIC PAIN: ICD-10-CM

## 2024-06-03 DIAGNOSIS — E78.5 DYSLIPIDEMIA: Primary | ICD-10-CM

## 2024-06-03 DIAGNOSIS — I89.0 LYMPHEDEMA OF BOTH LOWER EXTREMITIES: ICD-10-CM

## 2024-06-03 DIAGNOSIS — F31.9 BIPOLAR AFFECTIVE DISORDER, REMISSION STATUS UNSPECIFIED (HCC): ICD-10-CM

## 2024-06-03 DIAGNOSIS — M19.90 ARTHRITIS: ICD-10-CM

## 2024-06-03 DIAGNOSIS — E66.01 MORBID OBESITY WITH BMI OF 60.0-69.9, ADULT (HCC): ICD-10-CM

## 2024-06-03 DIAGNOSIS — M25.511 RIGHT SHOULDER PAIN, UNSPECIFIED CHRONICITY: ICD-10-CM

## 2024-06-03 DIAGNOSIS — Z90.3 HISTORY OF SLEEVE GASTRECTOMY: ICD-10-CM

## 2024-06-03 DIAGNOSIS — R63.2 BINGE EATING: ICD-10-CM

## 2024-06-03 PROCEDURE — 3008F BODY MASS INDEX DOCD: CPT | Performed by: NURSE PRACTITIONER

## 2024-06-03 PROCEDURE — 73030 X-RAY EXAM OF SHOULDER: CPT | Performed by: PHYSICIAN ASSISTANT

## 2024-06-03 PROCEDURE — 3079F DIAST BP 80-89 MM HG: CPT | Performed by: NURSE PRACTITIONER

## 2024-06-03 PROCEDURE — 3074F SYST BP LT 130 MM HG: CPT | Performed by: NURSE PRACTITIONER

## 2024-06-03 PROCEDURE — 99214 OFFICE O/P EST MOD 30 MIN: CPT | Performed by: NURSE PRACTITIONER

## 2024-06-03 NOTE — PROGRESS NOTES
[facility], [facility], [place]  [address]  [facility] [address]  Dept: [phone]    Patient:  Danika Sierra  :      10/10/1975  MRN:      [id number]    Referring Provider: N/A; co-worker referred patient      Chief Complaint:     Chief Complaint   Patient presents with    Follow - Up    Weight Management     Date of Surgery:     Surgery Type:   Sleeve gastrectomy   [facility]  SW: 380 lbs    Subjective       Satiety:  Positive occasionally    Food Intake:    Met with RD in the past    Fluid Intake:    0-3 cans of coca cola daily, down from 10/day  Water     Protein Intake:  Not tracking grams    Vitamin:  No bariatric vitamin; D daily     Exercise: No    Comorbidities:  Joint pain-Improvement?  no and Hyperlipidemia-Improvement?  no    Initial HPI:  Night eating: -  Portion sizes: +  Binge: +  Emotional: +  Depression: +  Grazing: + evening   Sweet tooth: +  Crunchy/salty: +    Reports weight regain after surgery and pregnancy of daughter (age 9).    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     Objective     Vitals: /82 (BP Location: Right arm, Patient Position: Sitting, Cuff Size: adult)   Pulse 104   Ht 5' 6\" (1.676 m)   Wt (!) 397 lb (180.1 kg)   LMP 2017 (LMP Unknown)   SpO2 95%   BMI 64.08 kg/m²     Starting weight: 395   Current weight: 397   Interval weight loss: +14   Total weight loss: +02    Last 3 Weights   24 1601 (!) 397 lb (180.1 kg)   24 1601 (!) 395 lb (179.2 kg)   24 1143 (!) 390 lb (176.9 kg)       Patient Medications:    Current Outpatient Medications:     semaglutide-weight management 0.25 MG/0.5ML Subcutaneous Solution Auto-injector, Inject 0.5 mL (0.25 mg total) into the skin once a week., Disp: 2 mL, Rfl: 1    EMGALITY 120 MG/ML Subcutaneous Solution Auto-injector, INJECT 1 AUTO INJECTOR ONCE MONTHLY AS DIRECTED, Disp: ,  Rfl:     montelukast 10 MG Oral Tab, Take 1 tablet (10 mg total) by mouth nightly., Disp: 90 tablet, Rfl: 1    Omeprazole 40 MG Oral Capsule Delayed Release, Take 1 capsule (40 mg total) by mouth daily., Disp: , Rfl:     furosemide 20 MG Oral Tab, Take 1 tablet (20 mg total) by mouth daily., Disp: , Rfl:     OLANZapine-FLUoxetine HCl 3-25 MG Oral Cap, Take 1 capsule by mouth daily., Disp: , Rfl:     Pancrelipase, Lip-Prot-Amyl, (ZENPEP) 68035-357389 units Oral Cap DR Particles, , Disp: , Rfl:     Zaleplon 5 MG Oral Cap, Take 1 capsule (5 mg total) by mouth nightly. TAKE AT BEDTIME, Disp: , Rfl:     benzonatate 200 MG Oral Cap, Take 1 capsule (200 mg total) by mouth 3 (three) times daily as needed for cough., Disp: 30 capsule, Rfl: 0    fluticasone furoate-vilanterol (BREO ELLIPTA) 100-25 MCG/ACT Inhalation Aerosol Powder, Breath Activated, Inhale 1 puff into the lungs daily. Rinse your mouth with water without swallowing after using BREO to help reduce your chance of getting thrush., Disp: 1 each, Rfl: 0    Zolpidem Tartrate ER 6.25 MG Oral Tab CR, Take 1 tablet (6.25 mg total) by mouth nightly as needed for Sleep., Disp: 30 tablet, Rfl: 1    albuterol 108 (90 Base) MCG/ACT Inhalation Aero Soln, Inhale 2 puffs into the lungs every 4 (four) hours as needed for Wheezing or Shortness of Breath., Disp: 18 g, Rfl: 5    fenofibrate 145 MG Oral Tab, Take 1 tablet (145 mg total) by mouth daily., Disp: , Rfl:     topiramate 50 MG Oral Tab, Take 1 tablet (50 mg total) by mouth in the morning, at noon, in the evening, and at bedtime., Disp: , Rfl:     ALPRAZolam 0.25 MG Oral Tab, TAKE 1 OR 2 TABLETS BY MOUTH ONCE A DAY AT BEDTIME, Disp: , Rfl:     Cholecalciferol 100 MCG (4000 UT) Oral Cap, Vitamin D3 100 mcg (4,000 unit) capsule   125 micrograms every day by oral route., Disp: , Rfl:     cyclobenzaprine 10 MG Oral Tab, , Disp: , Rfl:     divalproex  MG Oral Tablet 24 Hr, Take 1 tablet (500 mg total) by mouth daily.,  Disp: , Rfl:     gabapentin 600 MG Oral Tab, Take 1 tablet (600 mg total) by mouth in the morning, at noon, in the evening, and at bedtime., Disp: , Rfl:     oxyCODONE-acetaminophen  MG Oral Tab, Take 1 tablet by mouth every 4 (four) hours as needed for Pain., Disp: , Rfl:     Multiple Vitamins-Minerals (MULTI-VITAMIN/MINERALS) Oral Tab, Take 1 tablet by mouth daily., Disp: , Rfl:     loratadine 10 MG Oral Tab, Take 1 tablet (10 mg total) by mouth daily., Disp: , Rfl:     DULoxetine 30 MG Oral Cap DR Particles, Take 1 capsule (30 mg total) by mouth 2 (two) times daily., Disp: 180 capsule, Rfl: 1    Allergies:  Neosporin [neomycin-bacitracin-polymyxin], Milk, and Casein     Social History:  Reviewed     Surgical History:    Past Surgical History:   Procedure Laterality Date    Colonoscopy N/A 11/9/2021    Procedure: COLONOSCOPY with biopsies;  Surgeon: Benson Torres MD;  Location:  ENDOSCOPY    Each add tooth extraction  03/2021    Forearm/wrist surgery unlisted Right 2012     two procerdures - tendon repair & fusion of wrist    Lap adjustable gastric band  5/2/12    GASTRIC SLEEVE    Laparoscopy,vag hysterectomy  05/05/2017    Leg/ankle surgery proc unlisted Left 1999 + 2000    one for bone chip, one for nerve/tendon issues    Removal of ovarian cyst(s) Left 6/1/16    Shoulder arthroscopy Right 02/05/2021    Dr Mei       Family History:    Family History   Problem Relation Age of Onset    No Known Problems Mother     Cancer Father         lung cancer, smoker    Heart Disease Maternal Grandmother         stent placement    Cancer Maternal Grandmother         lung cancer    Other (rheumatoid arthritis) Maternal Grandmother     Pacemaker Maternal Grandfather     Prostate Cancer Maternal Grandfather     Cancer Paternal Grandmother         stage IV, unclear primary        Physical Exam:    General appearance: alert, appears stated age, cooperative, and antalgic gait, walks with cane  Head: Normocephalic,  without obvious abnormality, atraumatic  Neck: no adenopathy, no carotid bruit, no JVD, supple, symmetrical, trachea midline, and thyroid not enlarged, symmetric, no tenderness/mass/nodules  Lungs: clear to auscultation bilaterally  Heart: S1, S2 normal, no murmur, click, rub or gallop, regular rate and rhythm  Abdomen: soft, non-tender; bowel sounds normal; no masses,  no organomegaly and obese, large pannus   Extremities: edema BLE  Pulses: 2+ and symmetric  Skin:  large pannus       ROS:    Constitutional: positive for fatigue  Respiratory: positive for dyspnea on exertion  Cardiovascular: negative  Gastrointestinal: negative  Integument/breast: positive for large pannus, rash  Hematologic/lymphatic: negative  Musculoskeletal:positive for arthralgias, back pain, and neck pain  Neurological: negative  Behavioral/Psych: negative  Endocrine: negative  All other systems were reviewed and are negative    Assessment       Encounter Diagnosis(ses):   Encounter Diagnoses   Name Primary?    Dyslipidemia Yes    History of sleeve gastrectomy     Lymphedema of both lower extremities     Morbid obesity with BMI of 60.0-69.9, adult (HCC)     Bipolar affective disorder, remission status unspecified (HCC)     Binge eating     Arthritis     Other chronic pain     Prediabetes        Plan       Date of Surgery:   2013  Surgery Type:   Sleeve gastrectomy   [facility]  SW: 380 lbs    DYSLIPIDEMIA: Recommend dietary changes and lifestyle modifications as discussed below. Monitor.     Lab Results   Component Value Date/Time    CHOLEST 217 (H) 06/01/2024 09:13 AM     (H) 06/01/2024 09:13 AM    HDL 44 06/01/2024 09:13 AM    TRIG 160 (H) 06/01/2024 09:13 AM    VLDL 30 06/01/2024 09:13 AM    TCHDLRATIO 2.93 02/20/2017 08:48 AM       OBESITY:  Binge Eating:     Reduce/taper pop.     Aim for 3 meals per day- eat protein first, followed by soft vegetables, fruits, and whole grains- measure food.   Include 1-2 snacks per  day as needed and to help meet protein needs throughout the day.      Exercise as tolerated- significant pain.     Aim for adequate water intake > 64 oz/day.     Labs:  4/17/23- B 12, Vitamin D, TSH, CBC in range.  CRP elevated.   6/1/24- TSH, Vitamin D, CBC, iron, ferritin, folate in range.   FBS, a1c 6.0- prediabetes.   Cholesterol elevated.   AST elevated.   B 1 pending.      Met with RD, follow up as recommended.    Switch to bariatric vitamin. Continue Vitamin D.    Continue therapy.      Denies personal or family hx medullary thyroid CA, endocrine neoplasia syndrome, pancreatitis hx, suicidal ideation. No renal impairment, severe GI disease, diabetes, pancreatitis risks noted.     Tolerated Saxenda well- shortage.  Stopped Saxenda.  Switched to Wegovy.   Stopped Wegovy earlier this year- insurance issue.    Start 0.25 mg weekly.  Increase dose monthly as tolerated.      SQ administration teaching previously provided to patient.   Discussed risks, benefits, and side effects of medication.    RTC 4 months.      SAVANAH Mcgrath

## 2024-06-07 LAB — VITAMIN B1 WHOLE BLD: 113.7 NMOL/L

## 2024-06-20 DIAGNOSIS — E66.01 MORBID OBESITY WITH BMI OF 60.0-69.9, ADULT (HCC): ICD-10-CM

## 2024-06-21 DIAGNOSIS — E66.01 MORBID OBESITY WITH BMI OF 60.0-69.9, ADULT (HCC): Primary | ICD-10-CM

## 2024-06-21 RX ORDER — SEMAGLUTIDE 0.5 MG/.5ML
0.5 INJECTION, SOLUTION SUBCUTANEOUS WEEKLY
Qty: 4 EACH | Refills: 1 | Status: SHIPPED | OUTPATIENT
Start: 2024-06-21

## 2024-07-31 DIAGNOSIS — E66.01 MORBID OBESITY WITH BMI OF 60.0-69.9, ADULT (HCC): Primary | ICD-10-CM

## 2024-07-31 RX ORDER — SEMAGLUTIDE 1 MG/.5ML
1 INJECTION, SOLUTION SUBCUTANEOUS WEEKLY
Qty: 4 EACH | Refills: 1 | Status: SHIPPED | OUTPATIENT
Start: 2024-07-31

## 2024-08-15 ENCOUNTER — TELEPHONE (OUTPATIENT)
Dept: SURGERY | Facility: CLINIC | Age: 49
End: 2024-08-15

## 2024-08-29 DIAGNOSIS — E66.01 MORBID OBESITY WITH BMI OF 60.0-69.9, ADULT (HCC): ICD-10-CM

## 2024-08-29 RX ORDER — PHENTERMINE HYDROCHLORIDE 37.5 MG/1
37.5 TABLET ORAL
Qty: 30 TABLET | Refills: 1 | Status: SHIPPED | OUTPATIENT
Start: 2024-08-29

## 2024-08-29 RX ORDER — SEMAGLUTIDE 1 MG/.5ML
1 INJECTION, SOLUTION SUBCUTANEOUS WEEKLY
Qty: 4 EACH | Refills: 1 | Status: SHIPPED | OUTPATIENT
Start: 2024-08-29

## 2024-08-29 RX ORDER — ONDANSETRON 4 MG/1
4 TABLET, ORALLY DISINTEGRATING ORAL EVERY 8 HOURS PRN
Qty: 30 TABLET | Refills: 0 | Status: SHIPPED | OUTPATIENT
Start: 2024-08-29

## 2024-09-23 DIAGNOSIS — E66.01 MORBID OBESITY WITH BMI OF 60.0-69.9, ADULT (HCC): Primary | ICD-10-CM

## 2024-12-26 ENCOUNTER — OFFICE VISIT (OUTPATIENT)
Dept: RHEUMATOLOGY | Facility: CLINIC | Age: 49
End: 2024-12-26

## 2024-12-26 VITALS — WEIGHT: 293 LBS | HEIGHT: 66 IN | BODY MASS INDEX: 47.09 KG/M2

## 2024-12-26 DIAGNOSIS — M25.50 POLYARTHRALGIA: ICD-10-CM

## 2024-12-26 DIAGNOSIS — M79.7 FIBROMYALGIA: Primary | ICD-10-CM

## 2024-12-26 PROCEDURE — 99214 OFFICE O/P EST MOD 30 MIN: CPT | Performed by: INTERNAL MEDICINE

## 2024-12-26 PROCEDURE — 3008F BODY MASS INDEX DOCD: CPT | Performed by: INTERNAL MEDICINE

## 2024-12-26 RX ORDER — MILNACIPRAN HYDROCHLORIDE 12.5-25-5
KIT ORAL
Qty: 1 EACH | Refills: 0 | Status: SHIPPED | OUTPATIENT
Start: 2024-12-26

## 2024-12-26 NOTE — PROGRESS NOTES
Danika Sierra is a 49 year old female who presents for   Chief Complaint   Patient presents with    Fibromyalgia Syndrome    Neck Pain    Shoulder Pain   .   HPI:     I had the pleasure of seeing Danika Sierra on 4/23/2021 for evaluation.     She is a pleasant 45 year old who has fibromyalgia and referred by Dr. Silver.   She was diagnosed in 2018 by a physiatry at [facility] [place]. Craft. She has had joint pains for 4 years. She has had labs in 2016 negative for inflammatory arthritis.   She was put on tramadol and gabapentin. She saw her and then switched to pain specilaist. She cont. To get the gabapentin 600mg tid and tramadol 50mg tid.   The tramadol doesn't help her. The gapapentin really helps her.   She feels more pain with recent stress. And pandemic, marriage and recentdx of daughter with autism. It's out of control for her right now.     She did do physical therapy in the beginnning and it didn't do much.   She sometimes gets swelling in her hands.   Sometimes her ankles and legs are really swollen.   Her neck and shoulders hurt all the time. 8/10 pain.  She has pain in her legs - upper legs. 2/10 pain. Walking can make them 5/10 pain.   Ibuprofen and tyelnol doesn't help. norco doesn't help.   She doesn't like taking pain medication anyways.     5/13/2021  She increased her gabapentin to 800mg tid and she has not felt better. No increased side effects.   Today her pain is 6/10 pain.   tizandine 4mg hleps her at night but she cna't take this during the day b/c it makes her too sleepy.   Flexeril doenst' help her during the day.     Her migraines have subsided for now. se thinks her dental issues aggaravated them. She hasn' elton a migraine in over 1 month.   She's on topamax for headaches and curbing her appetitie.     She takes tramadol twice a day every day. It helped her at first .     6/10/2021  She did try the low dose naltrexone. She feels it's helping a little bit but not much.  She has  about 4/10 pain.   Her psychiatrist is ok with switching off the gabapentin to lyrica but her insurance doesn'tt cover it completely   She was also told that cymbalta and savella.       7/15/2021  She has about 3/10 pain. She is feelign like the dulxoetine is working for her.   No side effects.   She has on increased anixety or depression with adding the dulxoeitn.   She bought a video of diallo chi and she will still need to watch it.     12/31/2021  She feels her pain is increasing throughout her whole body. She feels it's hard to get up and down and walk . She's flared for the last week.   She switched psychiatrist about 1 month ago and on different medications. Now on lamotrogine now.   It's helping more.   She's still on the duloxetine 30mg a day.   She's on gabapentin 600mg four times a day.   She has about 10/10 pain. She feels it might be stress. Barely could get through work.     3/30/2023  She is taking the gabapentin 600mg fourth times a day.   It's not helping all the time.   In the extreme cold and extreme heat makes it worse.   The duloxetine 30mg bid is not hleping much.   She has 7/10 pain.   She has been getting a brown patch over her right shin- pretibial area.   seh has a half butterfly rash on her face.     She will try to see her psychiartist in the next month.     12/26/2024  Her pain is out of control, last seen 1 1/2 years ago   She's on pain meds already -   She's on duloxetine 60mg bid and gabapnetin 600mg four times a day    She has been seeing pain doctor - dr. Chandler.   She has 7/10 pain.   No injections done  It hits her bad in her neck and shoulders and lower back -   She is off all her anti depressents - she didn't want to taek it anymore.    with her  so not as anxious as  before.       Wt Readings from Last 2 Encounters:   12/26/24 (!) 338 lb (153.3 kg)   06/03/24 (!) 397 lb (180.1 kg)     Body mass index is 54.55 kg/m².      Current Outpatient Medications   Medication  Sig Dispense Refill    semaglutide-weight management (WEGOVY) 1 MG/0.5ML Subcutaneous Solution Auto-injector Inject 0.5 mL (1 mg total) into the skin once a week. 4 each 1    Phentermine HCl 37.5 MG Oral Tab Take 1 tablet (37.5 mg total) by mouth every morning before breakfast. Start with 1/2 tablet daily, increase to full tablet daily as tolerated 30 tablet 1    ondansetron 4 MG Oral Tablet Dispersible Take 1 tablet (4 mg total) by mouth every 8 (eight) hours as needed. 30 tablet 0    semaglutide-weight management (WEGOVY) 0.5 MG/0.5ML Subcutaneous Solution Auto-injector Inject 0.5 mL (0.5 mg total) into the skin once a week. 4 each 1    semaglutide-weight management 0.25 MG/0.5ML Subcutaneous Solution Auto-injector Inject 0.5 mL (0.25 mg total) into the skin once a week. 2 mL 1    EMGALITY 120 MG/ML Subcutaneous Solution Auto-injector INJECT 1 AUTO INJECTOR ONCE MONTHLY AS DIRECTED      montelukast 10 MG Oral Tab Take 1 tablet (10 mg total) by mouth nightly. 90 tablet 1    Omeprazole 40 MG Oral Capsule Delayed Release Take 1 capsule (40 mg total) by mouth daily.      furosemide 20 MG Oral Tab Take 1 tablet (20 mg total) by mouth daily.      OLANZapine-FLUoxetine HCl 3-25 MG Oral Cap Take 1 capsule by mouth daily.      Pancrelipase, Lip-Prot-Amyl, (ZENPEP) 27813-578667 units Oral Cap DR Particles       Zaleplon 5 MG Oral Cap Take 1 capsule (5 mg total) by mouth nightly. TAKE AT BEDTIME      fluticasone furoate-vilanterol (BREO ELLIPTA) 100-25 MCG/ACT Inhalation Aerosol Powder, Breath Activated Inhale 1 puff into the lungs daily. Rinse your mouth with water without swallowing after using BREO to help reduce your chance of getting thrush. 1 each 0    Zolpidem Tartrate ER 6.25 MG Oral Tab CR Take 1 tablet (6.25 mg total) by mouth nightly as needed for Sleep. 30 tablet 1    albuterol 108 (90 Base) MCG/ACT Inhalation Aero Soln Inhale 2 puffs into the lungs every 4 (four) hours as needed for Wheezing or Shortness of  Breath. 18 g 5    fenofibrate 145 MG Oral Tab Take 1 tablet (145 mg total) by mouth daily.      topiramate 50 MG Oral Tab Take 1 tablet (50 mg total) by mouth in the morning, at noon, in the evening, and at bedtime.      ALPRAZolam 0.25 MG Oral Tab TAKE 1 OR 2 TABLETS BY MOUTH ONCE A DAY AT BEDTIME      Cholecalciferol 100 MCG (4000 UT) Oral Cap Vitamin D3 100 mcg (4,000 unit) capsule    125 micrograms every day by oral route.      cyclobenzaprine 10 MG Oral Tab       divalproex  MG Oral Tablet 24 Hr Take 1 tablet (500 mg total) by mouth daily.      gabapentin 600 MG Oral Tab Take 1 tablet (600 mg total) by mouth in the morning, at noon, in the evening, and at bedtime.      oxyCODONE-acetaminophen  MG Oral Tab Take 1 tablet by mouth every 4 (four) hours as needed for Pain.      Multiple Vitamins-Minerals (MULTI-VITAMIN/MINERALS) Oral Tab Take 1 tablet by mouth daily.      loratadine 10 MG Oral Tab Take 1 tablet (10 mg total) by mouth daily.      DULoxetine 30 MG Oral Cap DR Particles Take 1 capsule (30 mg total) by mouth 2 (two) times daily. 180 capsule 1    benzonatate 200 MG Oral Cap Take 1 capsule (200 mg total) by mouth 3 (three) times daily as needed for cough. 30 capsule 0      Past Medical History:    ACNE    ANXIETY    Asthma (HCC)    Back problem    Bipolar affective disorder (HCC)    Depression    Esophageal reflux    Fibromyalgia    Hx of motion sickness    IBS (irritable bowel syndrome)    Migraines    Morbid obesity with BMI of 50.0-59.9, adult (HCC)    Obstructive sleep apnea (adult) (pediatric)    AHI 11 autoPAP 6-16 Premier    PONV (postoperative nausea and vomiting)    Sleep apnea    Visual impairment    glasses      Past Surgical History:   Procedure Laterality Date    Colonoscopy N/A 11/9/2021    Procedure: COLONOSCOPY with biopsies;  Surgeon: Benson Torres MD;  Location:  ENDOSCOPY    Each add tooth extraction  03/2021    Forearm/wrist surgery unlisted Right 2012     two  procerdures - tendon repair & fusion of wrist    Lap adjustable gastric band  12    GASTRIC SLEEVE    Laparoscopy,vag hysterectomy  2017    Leg/ankle surgery proc unlisted Left  +     one for bone chip, one for nerve/tendon issues    Removal of ovarian cyst(s) Left 16    Shoulder arthroscopy Right 2021    Dr Mei      Family History   Problem Relation Age of Onset    No Known Problems Mother     Cancer Father         lung cancer, smoker    Heart Disease Maternal Grandmother         stent placement    Cancer Maternal Grandmother         lung cancer    Other (rheumatoid arthritis) Maternal Grandmother     Pacemaker Maternal Grandfather     Prostate Cancer Maternal Grandfather     Cancer Paternal Grandmother         stage IV, unclear primary    grandmother had rheumatoid arthritis - maternal  Has 1 living sister   1  in MVA   Mom lives close   Dad passed from cancer    Social History:  Social History     Socioeconomic History    Marital status:     Number of children: 0    Years of education: 16   Occupational History    Occupation: Accounting   Tobacco Use    Smoking status: Never    Smokeless tobacco: Never   Vaping Use    Vaping status: Never Used   Substance and Sexual Activity    Alcohol use: Yes     Alcohol/week: 0.0 - 2.0 standard drinks of alcohol     Comment: social    Drug use: No    Sexual activity: Not Currently     Partners: Male   Other Topics Concern     Service No    Blood Transfusions No    Caffeine Concern No     Comment: 3-4 soda/ day    Occupational Exposure No    Hobby Hazards No    Sleep Concern Yes     Comment: Poor Sleep    Stress Concern No    Weight Concern No    Special Diet No    Back Care No    Exercise Yes     Comment: 1-2 x/ week, walking    Bike Helmet No    Seat Belt Yes    Self-Exams No     Social Drivers of Health      Received from [employer]    [facility]      Account x 21 years,    ,   1 daughter - 6 year old -      REVIEW  OF SYSTEMS:   Review Of Systems:  Fatigue  Constitutional:No fever, no change in weight or appetitie  Derm: No rashes, no oral ulcers, no alopecia,  photosensitivity, no psoriasis  HEENT:  dry eyes,  dry mouth, no Raynaud's,hands can crack abd in the cold,  no nasal ulcers, no parotid swelling,  neck pain, no jaw pain, no temple pain  Eyes: No visual changes, occl palpitations   CVS: No chest pain, no heart disease  RS: No SOB, no Cough, No Pleurtic pain,   GI: No nausea, no vomiiting, no abominal pain, no hx of ulcer, no gastritis, gets heartburn, no dyshpagia, no BRBPR or melena  : no dysuria, no hx of miscarriages, no DVT Hx, no hx of OCP,   Neuro: slight carpal tunnel,  numbness or tingling in hands,  no headache, no hx of seizures,   Psych:  hx of anxiety or depression, has bipolar 2-   ENDO: no hx of thyroid disease, no hx of DM  Joint/Muscluskeltal: see HPI,   All other ROS are negative.     EXAM:   Ht 5' 6\" (1.676 m)   Wt (!) 338 lb (153.3 kg)   LMP 05/01/2017 (LMP Unknown)   BMI 54.55 kg/m²   HEENT:  EOM intact, clear sclera, PERRLA, pleasant, no acute distress, no CAD, no neck tendnerness, good ROM,   No rashes  CVS: RRR, no murmurs  RS: CTAB, no crackles, no rhonchi  ABD: Soft Non tender,  Joint exam:   Tender in neck - paracervical raea    tender in shoulders   Tender in elbows   Mild tender in left 4th pip, and other pip   Tender in right 1-5th pips- of hands - mild fulllness of right 3rd mcp   Not tender in wrists    tender in lwoer back    tender in hips    tender in knees , enlarged, crepitus b/l   Swollen legs - chronic   - squeeze test positive       Component      Latest Ref Rng & Units 4/23/2021   C-REACTIVE PROTEIN      <0.30 mg/dL 1.11 (H)   SED RATE      0 - 20 mm/Hr 12   C-Citrullinated Peptide IgG AB      0.0 - 6.9 U/mL 0.6   RHEUMATOID FACTOR      <15 IU/mL <10   DAREK SCREEN WITH REFLEX (S)      Negative Negative     Component      Latest Ref Rng 6/1/2024   WBC      4.0 - 11.0 x10(3) uL  5.6    RBC      3.80 - 5.30 x10(6)uL 5.17    Hemoglobin      12.0 - 16.0 g/dL 14.6    Hematocrit      35.0 - 48.0 % 44.1    MCV      80.0 - 100.0 fL 85.3    MCH      26.0 - 34.0 pg 28.2    MCHC      31.0 - 37.0 g/dL 33.1    RDW-SD      35.1 - 46.3 fL 42.4    RDW      11.0 - 15.0 % 13.5    Platelet Count      150.0 - 450.0 10(3)uL 267.0    Prelim Neutrophil Abs      1.50 - 7.70 x10 (3) uL 3.13    Neutrophils Absolute      1.50 - 7.70 x10(3) uL 3.13    Lymphocytes Absolute      1.00 - 4.00 x10(3) uL 1.74    Monocytes Absolute      0.10 - 1.00 x10(3) uL 0.36    Eosinophils Absolute      0.00 - 0.70 x10(3) uL 0.23    Basophils Absolute      0.00 - 0.20 x10(3) uL 0.10    Immature Granulocyte Absolute      0.00 - 1.00 x10(3) uL 0.01    Neutrophils %      % 56.2    Lymphocytes %      % 31.2    Monocytes %      % 6.5    Eosinophils %      % 4.1    Basophils %      % 1.8    Immature Granulocyte %      % 0.2    Glucose      70 - 99 mg/dL 106 (H)    Sodium      136 - 145 mmol/L 140    Potassium      3.5 - 5.1 mmol/L 4.0    Chloride      98 - 112 mmol/L 108    Carbon Dioxide, Total      21.0 - 32.0 mmol/L 25.0    ANION GAP      0 - 18 mmol/L 7    BUN      9 - 23 mg/dL 9    CREATININE      0.55 - 1.02 mg/dL 0.77    BUN/CREATININE RATIO      10.0 - 20.0  11.7    CALCIUM      8.7 - 10.4 mg/dL 9.4    CALCULATED OSMOLALITY      275 - 295 mOsm/kg 289    EGFR      >=60 mL/min/1.73m2 95    ALT (SGPT)      10 - 49 U/L 39    AST (SGOT)      <=34 U/L 40 (H)    ALKALINE PHOSPHATASE      39 - 100 U/L 103 (H)    Total Bilirubin      0.3 - 1.2 mg/dL 0.4    PROTEIN, TOTAL      5.7 - 8.2 g/dL 7.8    Albumin      3.2 - 4.8 g/dL 4.4    Globulin      2.0 - 3.5 g/dL 3.4    A/G Ratio      1.0 - 2.0  1.3    Patient Fasting for CMP? Yes    Cholesterol, Total      <200 mg/dL 217 (H)    HDL Cholesterol      40 - 59 mg/dL 44    Triglycerides      30 - 149 mg/dL 160 (H)    LDL Cholesterol Calc      <100 mg/dL 144 (H)    VLDL      0 - 30 mg/dL 30     NON-HDL CHOLESTEROL      <130 mg/dL 173 (H)    Patient Fasting for Lipid? Yes    Iron, Serum      50 - 170 ug/dL 77    Transferrin      250 - 380 mg/dL 205 (L)    Iron Bind.Cap.(TIBC)      250 - 425 ug/dL 305    Iron Saturation      15 - 50 % 25    HEMOGLOBIN A1c      <5.7 % 6.0 (H)    ESTIMATED AVERAGE GLUCOSE      68 - 126 mg/dL 126    FOLATE (FOLIC ACID), SERUM      >5.4 ng/mL 9.5    FERRITIN      12.0 - 240.0 ng/mL 107.5    VITAMIN B1 (THIAMINE), WHOLE B      66.5 - 200.0 nmol/L 113.7    VITAMIN D, 25-OH, TOTAL      30.0 - 100.0 ng/mL 54.0    TSH      0.550 - 4.780 mIU/mL 1.469       Legend:  (H) High  (L) Low    4/23/2021 - left knee xray   1. Mild osteoarthritis left knee.   2. Suggestion of subcutaneous edema.      4/23/2021 - right knee xray   1. Mild osteoarthritis of medial and lateral compartment, and more pronounced osteoarthritis in patellofemoral compartment.   2. Probable subcutaneous edema.    ASSESSMENT AND PLAN:   Danika Sierra is a 49 year old female who presents for   Chief Complaint   Patient presents with    Fibromyalgia Syndrome    Neck Pain    Shoulder Pain     1. fibromaylgia - slightly elevated crp  - labs negative for inflammatory arthritis and/or connective tissue disease   In the past 2016 and 2021 work up was negativ e-     Cont. duloxetine 30mg twice a day    Consider savella - max is savella 50mg a day - = she's will to try - try starter sia and then 50mg a day - max - while on any antidepressants in the future   The other option is increasing the dulxoetine -   Info on savella   Taper off the duloxetine and then add the savella - - starter pack but highest dose would be 50mg a day instead of the full dose  She feels duloxetine is not helping and open to trying savella - she would have to clear it with her psychiartrist.   - cont. Gabapentin 600mg four times a day -    from  recently and now sh eis off several medications b/c she is doing better.   Stay on  gabapenton 600mg qid - (  gabapentin to 800mg three times a day did not help her - increased a few motnhs ago )  - d/w pt - benson while on  duloxetine ,     - now off  Tramadol 50mg three times a day - - she is on percoet per her pain doctor   On oxycodone   - tizandine 4mg at night   -  S/p LDN 4.5mga day x 1 month - is helping he r- but she will like to go off - causing a little dizziness - declining trying it again.      Tracey is not covered as well for her - prefers not to switch at this time.  lyrica if ok with psyhicatrits.    Heating pad helps.   Heated pool -   - she thinks she was on cymbalt a long time ago -     - s/p tordal 30mg im x 1 a- can't remember if this helped her.   Try again - ketoralc 30mg im x 1   2. Bipolar - cont. meds   - on lamotrigine, trazodone -     3. Mild osteoarthritis in knees - but minimal -     4. Itching and dry skin patch over her right shin - not definitely psoriasis     Summary:  1. Cont.   duloxetine to 30mg twice a day -   2. - try savellas starter sia - max is 50mg ad ay -   3. Cont. Gabapentin 600mg four times a day   4. Return to clinic in 2/18 at 12 pm on Tuesday - [facility]  5.percocet per pain doctor  7. Arcadio chi          [facility] -[facility] -   [phone]       Alana Morris MD  12/26/2024   4:05 PM

## 2024-12-26 NOTE — PATIENT INSTRUCTIONS
1. Cont.   duloxetine to 30mg twice a day -   2. - try savella starter sia - max is 50mg ad ay -   3. Cont. Gabapentin 600mg four times a day   4. Return to clinic in 2/18 at 12 pm on Tuesday - [facility]  5.percocet per pain doctor  7. Arcadio chi

## 2024-12-27 ENCOUNTER — HOSPITAL ENCOUNTER (OUTPATIENT)
Age: 49
Discharge: HOME | End: 2024-12-27
Payer: COMMERCIAL

## 2024-12-27 VITALS
OXYGEN SATURATION: 94 % | TEMPERATURE: 98 F | DIASTOLIC BLOOD PRESSURE: 71 MMHG | RESPIRATION RATE: 16 BRPM | HEART RATE: 89 BPM | SYSTOLIC BLOOD PRESSURE: 142 MMHG

## 2024-12-27 DIAGNOSIS — J02.0 STREPTOCOCCAL SORE THROAT: Primary | ICD-10-CM

## 2024-12-27 LAB
POCT INFLUENZA A: NEGATIVE
POCT INFLUENZA B: NEGATIVE
S PYO AG THROAT QL IA.RAPID: POSITIVE
SARS-COV-2 RNA RESP QL NAA+PROBE: NOT DETECTED

## 2024-12-27 PROCEDURE — 87502 INFLUENZA DNA AMP PROBE: CPT | Performed by: NURSE PRACTITIONER

## 2024-12-27 PROCEDURE — 87651 STREP A DNA AMP PROBE: CPT | Performed by: NURSE PRACTITIONER

## 2024-12-27 PROCEDURE — 99214 OFFICE O/P EST MOD 30 MIN: CPT

## 2024-12-27 PROCEDURE — 99213 OFFICE O/P EST LOW 20 MIN: CPT

## 2024-12-27 RX ORDER — PENICILLIN V POTASSIUM 500 MG/1
500 TABLET, FILM COATED ORAL 2 TIMES DAILY
Qty: 20 TABLET | Refills: 0 | Status: SHIPPED | OUTPATIENT
Start: 2024-12-27 | End: 2025-01-06

## 2024-12-27 NOTE — DISCHARGE INSTRUCTIONS
Please take medications as prescribed. Salt water gargles and tea with honey may help soothe throat. Discard the toothbrush after being on the antibiotics for 24 hours. Follow up with primary care provider.

## 2024-12-27 NOTE — ED PROVIDER NOTES
Patient Seen in: [facility]      History     Chief Complaint   Patient presents with    Sore Throat    Cough/URI     Stated Complaint: sore throat  Subjective:   HPI    This is a well-appearing 49-year-old female with a history of asthma, GERD, fibromyalgia, bipolar disorder who presents with sore throat.  States she had sore throat, frontal headache and congestion over the last 2 days.  No posterior neck pain or neck symptoms.  No rash.  Could not tolerate p.o. the difficulty.  No nausea, vomiting or diarrhea.  No shortness of breath or chest pain    Objective:   Past Medical History:    ACNE    ANXIETY    Asthma (HCC)    Back problem    Bipolar affective disorder (HCC)    Depression    Esophageal reflux    Fibromyalgia    Hx of motion sickness    IBS (irritable bowel syndrome)    Migraines    Morbid obesity with BMI of 50.0-59.9, adult (HCC)    Obstructive sleep apnea (adult) (pediatric)    AHI 11 autoPAP 6-16 Premier    PONV (postoperative nausea and vomiting)    Sleep apnea    Visual impairment    glasses            Past Surgical History:   Procedure Laterality Date    Colonoscopy N/A 11/9/2021    Procedure: COLONOSCOPY with biopsies;  Surgeon: Benson Torres MD;  Location:  ENDOSCOPY    Each add tooth extraction  03/2021    Forearm/wrist surgery unlisted Right 2012     two procerdures - tendon repair & fusion of wrist    Lap adjustable gastric band  5/2/12    GASTRIC SLEEVE    Laparoscopy,vag hysterectomy  05/05/2017    Leg/ankle surgery proc unlisted Left 1999 + 2000    one for bone chip, one for nerve/tendon issues    Removal of ovarian cyst(s) Left 6/1/16    Shoulder arthroscopy Right 02/05/2021    Dr Mei              Social History     Socioeconomic History    Marital status:     Number of children: 0    Years of education: 16   Occupational History    Occupation: Accounting   Tobacco Use    Smoking status: Never    Smokeless tobacco: Never   Vaping Use    Vaping status: Never Used    Substance and Sexual Activity    Alcohol use: Yes     Alcohol/week: 0.0 - 2.0 standard drinks of alcohol     Comment: social    Drug use: No    Sexual activity: Not Currently     Partners: Male   Other Topics Concern     Service No    Blood Transfusions No    Caffeine Concern No     Comment: 3-4 soda/ day    Occupational Exposure No    Hobby Hazards No    Sleep Concern Yes     Comment: Poor Sleep    Stress Concern No    Weight Concern No    Special Diet No    Back Care No    Exercise Yes     Comment: 1-2 x/ week, walking    Bike Helmet No    Seat Belt Yes    Self-Exams No     Social Drivers of Health      Received from [facility]            Review of Systems   All other systems reviewed and are negative.      Positive for stated complaint: Sore Throat and Cough/URI    Other systems are as noted in HPI.  Constitutional and vital signs reviewed.      All other systems reviewed and negative except as noted above.    Physical Exam     ED Triage Vitals [12/27/24 0815]   /71   Pulse 101   Resp 16   Temp 97.6 °F (36.4 °C)   Temp src Oral   SpO2 94 %   O2 Device None (Room air)     Current:/71   Pulse 89   Temp 97.6 °F (36.4 °C) (Oral)   Resp 16   LMP 05/01/2017 (LMP Unknown)   SpO2 94%     Physical Exam  Vitals and nursing note reviewed.   Constitutional:       General: She is awake. She is not in acute distress.     Appearance: Normal appearance. She is not ill-appearing, toxic-appearing or diaphoretic.   HENT:      Head: Normocephalic and atraumatic.      Right Ear: Tympanic membrane, ear canal and external ear normal. No tenderness. No middle ear effusion. Tympanic membrane is not erythematous.      Left Ear: Tympanic membrane, ear canal and external ear normal. No tenderness.  No middle ear effusion. Tympanic membrane is not erythematous.      Nose: Nose normal.      Mouth/Throat:      Lips: Pink.      Mouth: Mucous membranes are moist. No oral lesions.      Pharynx:  Oropharynx is clear. Uvula midline. Posterior oropharyngeal erythema present. No pharyngeal swelling, oropharyngeal exudate, uvula swelling or postnasal drip.      Tonsils: No tonsillar exudate or tonsillar abscesses.   Eyes:      General: Lids are normal.      Extraocular Movements: Extraocular movements intact.      Conjunctiva/sclera: Conjunctivae normal.      Pupils: Pupils are equal, round, and reactive to light.   Cardiovascular:      Rate and Rhythm: Normal rate and regular rhythm.      Pulses: Normal pulses.      Heart sounds: Normal heart sounds.   Pulmonary:      Effort: Pulmonary effort is normal.      Breath sounds: Normal breath sounds and air entry. No stridor, decreased air movement or transmitted upper airway sounds.   Musculoskeletal:      Cervical back: Full passive range of motion without pain and normal range of motion.   Skin:     General: Skin is warm and dry.      Capillary Refill: Capillary refill takes less than 2 seconds.   Neurological:      General: No focal deficit present.      Mental Status: She is alert and oriented to person, place, and time.   Psychiatric:         Mood and Affect: Mood normal.         Behavior: Behavior normal. Behavior is cooperative.         Thought Content: Thought content normal.         Judgment: Judgment normal.       ED Course   Strep positive, COVID-negative, influenza and  No results found.  Labs Reviewed   RAPID STREP A - Abnormal; Notable for the following components:       Result Value    Strep A by PCR Positive (*)     All other components within normal limits   POCT FLU TEST - Normal    Narrative:     This assay is a rapid molecular in vitro test utilizing nucleic acid amplification of influenza A and B viral RNA.   RAPID SARS-COV-2 BY PCR - Normal       MDM     Medical Decision Making  Differential diagnoses reflecting the complexity of care include but are not limited to viral versus bacterial pharyngitis, COVID-19, influenza.    Comorbidities that  add complexity to management include: N/A  History obtained by an independent source was from: N/A  Discussions of management was done with: N/A  My independent interpretations of studies include: Please take medications as prescribed. Salt water gargles and tea with honey may help soothe throat. Discard the toothbrush after being on the antibiotics for 24 hours. Follow up with primary care provider.  Positive, COVID-negative, influenza  Shared decision making was done by: Patient and myself  Patient is well appearing, non-toxic and in no acute distress.  Vital signs are stable.  Patient with clear speech, no drooling, easy respirations.  Discussed with her the strep here is positive.  Will treat with penicillin which she has tolerated in the past.  Follow-up with PCP.  Patient verbalized Medicare and states understanding    Amount and/or Complexity of Data Reviewed  ECG/medicine tests: ordered and independent interpretation performed. Decision-making details documented in ED Course.    Risk  OTC drugs.  Prescription drug management.        Disposition and Plan     Clinical Impression:  1. Streptococcal sore throat         Disposition:  Discharge  12/27/2024  8:45 am    Follow-up:  No follow-up provider specified.        Medications Prescribed:  Current Discharge Medication List        START taking these medications    Details   penicillin v potassium 500 MG Oral Tab Take 1 tablet (500 mg total) by mouth in the morning and 1 tablet (500 mg total) before bedtime. Do all this for 10 days.  Qty: 20 tablet, Refills: 0                Note to patient: The 21st Century cares act makes medical notes like these available to patients in the interest of transparency.  However, be advised this medical document and is intended as peer to peer communication.  It is read the medical language and may contain abbreviations or verbiage that are unfamiliar.  It may appear blunt or direct.  Medical documents are intended to carry  relevant information, fax is evident and the clinical opinion of the practitioner.    This note was prepared using Dragon Medical voice recognition dictation software.  As a result, errors may occur.  When identified, these errors have been corrected.  While every attempt is made to correct errors during dictation, discrepancies may still exist.    Molly Squires, SAVANAH  12/27/2024  8:41 AM

## 2025-02-05 ENCOUNTER — TELEPHONE (OUTPATIENT)
Dept: SURGERY | Facility: CLINIC | Age: 50
End: 2025-02-05

## 2025-02-06 ENCOUNTER — OFFICE VISIT (OUTPATIENT)
Dept: SURGERY | Facility: CLINIC | Age: 50
End: 2025-02-06
Payer: COMMERCIAL

## 2025-02-06 VITALS
DIASTOLIC BLOOD PRESSURE: 80 MMHG | OXYGEN SATURATION: 99 % | WEIGHT: 293 LBS | HEIGHT: 66 IN | HEART RATE: 75 BPM | BODY MASS INDEX: 47.09 KG/M2 | SYSTOLIC BLOOD PRESSURE: 126 MMHG

## 2025-02-06 DIAGNOSIS — I89.0 LYMPHEDEMA OF BOTH LOWER EXTREMITIES: ICD-10-CM

## 2025-02-06 DIAGNOSIS — R63.2 BINGE EATING: ICD-10-CM

## 2025-02-06 DIAGNOSIS — F31.9 BIPOLAR AFFECTIVE DISORDER, REMISSION STATUS UNSPECIFIED (HCC): ICD-10-CM

## 2025-02-06 DIAGNOSIS — Z90.3 HISTORY OF SLEEVE GASTRECTOMY: ICD-10-CM

## 2025-02-06 DIAGNOSIS — R73.03 PREDIABETES: ICD-10-CM

## 2025-02-06 DIAGNOSIS — E78.5 DYSLIPIDEMIA: Primary | ICD-10-CM

## 2025-02-06 DIAGNOSIS — Z51.81 ENCOUNTER FOR THERAPEUTIC DRUG MONITORING: ICD-10-CM

## 2025-02-06 DIAGNOSIS — G89.29 OTHER CHRONIC PAIN: ICD-10-CM

## 2025-02-06 DIAGNOSIS — E66.01 MORBID OBESITY WITH BMI OF 50.0-59.9, ADULT (HCC): ICD-10-CM

## 2025-02-06 DIAGNOSIS — M19.90 ARTHRITIS: ICD-10-CM

## 2025-02-06 RX ORDER — SEMAGLUTIDE 2.4 MG/.75ML
2.4 INJECTION, SOLUTION SUBCUTANEOUS WEEKLY
Qty: 3 ML | Refills: 1 | Status: SHIPPED | OUTPATIENT
Start: 2025-02-06

## 2025-02-06 NOTE — PROGRESS NOTES
[facility], [facility], [place]  [address]  [facility] [address]  Dept: [phone]    Patient:  Danika Sierra  :      10/10/1975  MRN:      [id number]    Referring Provider: N/A; co-worker referred patient      Chief Complaint:     Chief Complaint   Patient presents with    Follow - Up    Weight Management     Date of Surgery:     Surgery Type:   Sleeve gastrectomy   [facility]  SW: 380 lbs    Subjective     Patient reports that she continued on Wegovy since last visit ordered by pain service.  Highest dose 2.4 mg weekly.  New insurance.     Food Intake:    Met with RD in the past    Protein sources: chicken, turkey, fish  Add Fair Life protein shakes   Vegetables: corn, green beans, zucchini     Plan to aim for 100 grams protein/day    Fluid Intake:    0-3 cans of coca cola daily, down from 10/day  Water     Protein Intake:  Not tracking grams    Vitamin:  Yes MVI, D daily     Exercise: No  Add [id number] or Sit and Be Fit    Comorbidities:  Joint pain-Improvement?  no and Hyperlipidemia-Improvement?  no    Initial HPI:  Night eating: -  Portion sizes: +  Binge: +  Emotional: +  Depression: +  Grazing: + evening   Sweet tooth: +  Crunchy/salty: +    Reports weight regain after surgery and pregnancy of daughter (age 9).    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     Objective     Vitals: /80   Pulse 75   Ht 5' 6\" (1.676 m)   Wt (!) 356 lb (161.5 kg)   LMP 2017 (LMP Unknown)   SpO2 99%   BMI 57.46 kg/m²     Starting weight: 395   Current weight: 356   Interval weight loss: -40   Total weight loss: +02    Last 3 Weights   25 1542 (!) 356 lb (161.5 kg)   24 1524 (!) 338 lb (153.3 kg)   24 1601 (!) 397 lb (180.1 kg)       Patient Medications:    Current Outpatient Medications:     semaglutide-weight management (WEGOVY) 2.4 MG/0.75ML Subcutaneous  Solution Auto-injector, Inject 0.75 mL (2.4 mg total) into the skin once a week., Disp: 3 mL, Rfl: 1    Milnacipran HCl (SAVELLA TITRATION PACK) 12.5 & 25 & 50 MG Oral Misc, Take as directed - at maximum 50mg a day, Disp: 1 each, Rfl: 0    Phentermine HCl 37.5 MG Oral Tab, Take 1 tablet (37.5 mg total) by mouth every morning before breakfast. Start with 1/2 tablet daily, increase to full tablet daily as tolerated, Disp: 30 tablet, Rfl: 1    ondansetron 4 MG Oral Tablet Dispersible, Take 1 tablet (4 mg total) by mouth every 8 (eight) hours as needed., Disp: 30 tablet, Rfl: 0    EMGALITY 120 MG/ML Subcutaneous Solution Auto-injector, INJECT 1 AUTO INJECTOR ONCE MONTHLY AS DIRECTED, Disp: , Rfl:     montelukast 10 MG Oral Tab, Take 1 tablet (10 mg total) by mouth nightly., Disp: 90 tablet, Rfl: 1    Omeprazole 40 MG Oral Capsule Delayed Release, Take 1 capsule (40 mg total) by mouth daily., Disp: , Rfl:     furosemide 20 MG Oral Tab, Take 1 tablet (20 mg total) by mouth daily., Disp: , Rfl:     Pancrelipase, Lip-Prot-Amyl, (ZENPEP) 34120-770715 units Oral Cap DR Particles, , Disp: , Rfl:     Zaleplon 5 MG Oral Cap, Take 1 capsule (5 mg total) by mouth nightly. TAKE AT BEDTIME, Disp: , Rfl:     benzonatate 200 MG Oral Cap, Take 1 capsule (200 mg total) by mouth 3 (three) times daily as needed for cough., Disp: 30 capsule, Rfl: 0    fluticasone furoate-vilanterol (BREO ELLIPTA) 100-25 MCG/ACT Inhalation Aerosol Powder, Breath Activated, Inhale 1 puff into the lungs daily. Rinse your mouth with water without swallowing after using BREO to help reduce your chance of getting thrush., Disp: 1 each, Rfl: 0    Zolpidem Tartrate ER 6.25 MG Oral Tab CR, Take 1 tablet (6.25 mg total) by mouth nightly as needed for Sleep., Disp: 30 tablet, Rfl: 1    albuterol 108 (90 Base) MCG/ACT Inhalation Aero Soln, Inhale 2 puffs into the lungs every 4 (four) hours as needed for Wheezing or Shortness of Breath., Disp: 18 g, Rfl: 5     fenofibrate 145 MG Oral Tab, Take 1 tablet (145 mg total) by mouth daily., Disp: , Rfl:     topiramate 50 MG Oral Tab, Take 1 tablet (50 mg total) by mouth in the morning, at noon, in the evening, and at bedtime., Disp: , Rfl:     ALPRAZolam 0.25 MG Oral Tab, TAKE 1 OR 2 TABLETS BY MOUTH ONCE A DAY AT BEDTIME, Disp: , Rfl:     Cholecalciferol 100 MCG (4000 UT) Oral Cap, Vitamin D3 100 mcg (4,000 unit) capsule   125 micrograms every day by oral route., Disp: , Rfl:     cyclobenzaprine 10 MG Oral Tab, , Disp: , Rfl:     divalproex  MG Oral Tablet 24 Hr, Take 1 tablet (500 mg total) by mouth daily., Disp: , Rfl:     gabapentin 600 MG Oral Tab, Take 1 tablet (600 mg total) by mouth in the morning, at noon, in the evening, and at bedtime., Disp: , Rfl:     oxyCODONE-acetaminophen  MG Oral Tab, Take 1 tablet by mouth every 4 (four) hours as needed for Pain., Disp: , Rfl:     Multiple Vitamins-Minerals (MULTI-VITAMIN/MINERALS) Oral Tab, Take 1 tablet by mouth daily., Disp: , Rfl:     loratadine 10 MG Oral Tab, Take 1 tablet (10 mg total) by mouth daily., Disp: , Rfl:     DULoxetine 30 MG Oral Cap DR Particles, Take 1 capsule (30 mg total) by mouth 2 (two) times daily., Disp: 180 capsule, Rfl: 1    Allergies:  Neosporin [neomycin-bacitracin-polymyxin], Milk, and Casein     Social History:  Reviewed     Surgical History:    Past Surgical History:   Procedure Laterality Date    Colonoscopy N/A 11/9/2021    Procedure: COLONOSCOPY with biopsies;  Surgeon: Benson Torres MD;  Location:  ENDOSCOPY    Each add tooth extraction  03/2021    Forearm/wrist surgery unlisted Right 2012     two procerdures - tendon repair & fusion of wrist    Lap adjustable gastric band  5/2/12    GASTRIC SLEEVE    Laparoscopy,vag hysterectomy  05/05/2017    Leg/ankle surgery proc unlisted Left 1999 + 2000    one for bone chip, one for nerve/tendon issues    Removal of ovarian cyst(s) Left 6/1/16    Shoulder arthroscopy Right 02/05/2021     Dr Mei       Family History:    Family History   Problem Relation Age of Onset    No Known Problems Mother     Cancer Father         lung cancer, smoker    Heart Disease Maternal Grandmother         stent placement    Cancer Maternal Grandmother         lung cancer    Other (rheumatoid arthritis) Maternal Grandmother     Pacemaker Maternal Grandfather     Prostate Cancer Maternal Grandfather     Cancer Paternal Grandmother         stage IV, unclear primary        Physical Exam:    General appearance: alert, appears stated age, cooperative, and antalgic gait, walks with cane  Head: Normocephalic, without obvious abnormality, atraumatic  Neck: no adenopathy, no carotid bruit, no JVD, supple, symmetrical, trachea midline, and thyroid not enlarged, symmetric, no tenderness/mass/nodules  Lungs: clear to auscultation bilaterally  Heart: S1, S2 normal, no murmur, click, rub or gallop, regular rate and rhythm  Abdomen: soft, non-tender; bowel sounds normal; no masses,  no organomegaly and obese, large pannus   Extremities: edema BLE  Pulses: 2+ and symmetric  Skin:  large pannus       ROS:    Constitutional: positive for fatigue  Respiratory: positive for dyspnea on exertion  Cardiovascular: negative  Gastrointestinal: negative  Integument/breast: positive for large pannus, rash  Hematologic/lymphatic: negative  Musculoskeletal:positive for arthralgias, back pain, and neck pain  Neurological: negative  Behavioral/Psych: negative  Endocrine: negative  All other systems were reviewed and are negative    Assessment       Encounter Diagnosis(ses):   Encounter Diagnoses   Name Primary?    Dyslipidemia Yes    Encounter for therapeutic drug monitoring     Morbid obesity with BMI of 50.0-59.9, adult (HCC)     History of sleeve gastrectomy     Lymphedema of both lower extremities     Bipolar affective disorder, remission status unspecified (HCC)     Binge eating     Arthritis     Other chronic pain     Prediabetes          Plan        Date of Surgery:   2013  Surgery Type:   Sleeve gastrectomy   [facility]  SW: 380 lbs    DYSLIPIDEMIA: Recommend dietary changes and lifestyle modifications as discussed below. Monitor.     Lab Results   Component Value Date/Time    CHOLEST 217 (H) 06/01/2024 09:13 AM     (H) 06/01/2024 09:13 AM    HDL 44 06/01/2024 09:13 AM    TRIG 160 (H) 06/01/2024 09:13 AM    VLDL 30 06/01/2024 09:13 AM    TCHDLRATIO 2.93 02/20/2017 08:48 AM       OBESITY:  Binge Eating:     Reduce/taper pop.     Aim for 3 meals per day- eat protein first, followed by soft vegetables, fruits, and whole grains- measure food.   Include 1-2 snacks per day as needed and to help meet protein needs throughout the day.      Exercise as tolerated- significant pain.  Add Zis0Trck or Sit and Be Fit.     Aim for adequate water intake > 64 oz/day.     Labs:  4/17/23- B 12, Vitamin D, TSH, CBC in range.  CRP elevated.   6/1/24- TSH, Vitamin D, CBC, iron, ferritin, folate, B 1 in range.   FBS, a1c 6.0- prediabetes.   Cholesterol elevated.   AST elevated.     Met with RD, follow up as recommended.    Switch to bariatric vitamin. Continue Vitamin D.    Continue therapy.      Denies personal or family hx medullary thyroid CA, endocrine neoplasia syndrome, pancreatitis hx, suicidal ideation. No renal impairment, severe GI disease, diabetes, pancreatitis risks noted.     Tolerated Saxenda well- shortage.  Stopped Saxenda.  Switched to Wegovy.     New insurance.   Continue 2.4 mg weekly.  Increase dose monthly as tolerated.      SQ administration teaching previously provided to patient.   Discussed risks, benefits, and side effects of medication.    RTC 4 months.      SAVANAH Mcgrath

## 2025-02-14 DIAGNOSIS — E66.01 MORBID OBESITY WITH BMI OF 50.0-59.9, ADULT (HCC): ICD-10-CM

## 2025-02-17 RX ORDER — SEMAGLUTIDE 2.4 MG/.75ML
2.4 INJECTION, SOLUTION SUBCUTANEOUS WEEKLY
Qty: 3 ML | Refills: 1 | OUTPATIENT
Start: 2025-02-17

## 2025-03-03 ENCOUNTER — TELEPHONE (OUTPATIENT)
Dept: OTOLARYNGOLOGY | Facility: CLINIC | Age: 50
End: 2025-03-03

## 2025-06-30 NOTE — TELEPHONE ENCOUNTER
Received order for fluticasone propionate nasal suspension. Patient not seen for over a year. Sent [facility] informing that appointment is needed in order for refill to be sent. Provided phone number to schedule appointment

## 2025-07-01 RX ORDER — MONTELUKAST SODIUM 10 MG/1
10 TABLET ORAL NIGHTLY
Qty: 30 TABLET | Refills: 0 | Status: SHIPPED | OUTPATIENT
Start: 2025-07-01

## 2025-07-06 NOTE — TELEPHONE ENCOUNTER
Please call patient refilled montelukast for 1 month, last visit was more than 1 year ago , she is due for follow-up visit

## 2025-07-11 RX ORDER — FLUTICASONE PROPIONATE 50 MCG
1 SPRAY, SUSPENSION (ML) NASAL 2 TIMES DAILY
Qty: 48 G | Refills: 3 | OUTPATIENT
Start: 2025-07-11

## 2025-07-11 NOTE — TELEPHONE ENCOUNTER
Called patient who informed that she is not looking for Max RODRIGUEZ to send any refills at this time. Informed patient that appointment is needed for further refills. Patient verbalized understanding

## 2025-08-28 ENCOUNTER — APPOINTMENT (OUTPATIENT)
Dept: WOUND CARE | Facility: HOSPITAL | Age: 50
End: 2025-08-28
Attending: NURSE PRACTITIONER

## (undated) DEVICE — SUTURE FIBERWIRE S AR-7200

## (undated) DEVICE — BANDAID COVERLET 1X3

## (undated) DEVICE — SUT ETHIBOND 3-0 RB-1 X872H

## (undated) DEVICE — DRAPE,U/SHT,SPLIT,FILM,60X84,STERILE: Brand: MEDLINE

## (undated) DEVICE — FILTERLINE NASAL ADULT O2/CO2

## (undated) DEVICE — 3M™ IOBAN™ 2 ANTIMICROBIAL INCISE DRAPE 6648EZ: Brand: IOBAN™ 2

## (undated) DEVICE — CANNULA 7MM TWIST AR-6570

## (undated) DEVICE — NON-ADHERENT STRIPS,OIL EMULSION: Brand: CURITY

## (undated) DEVICE — VIOLET BRAIDED (POLYGLACTIN 910), SYNTHETIC ABSORBABLE SUTURE: Brand: COATED VICRYL

## (undated) DEVICE — SUTURE MONOCRYL 4-0 PS-2

## (undated) DEVICE — Device

## (undated) DEVICE — Device: Brand: DEFENDO AIR/WATER/SUCTION AND BIOPSY VALVE

## (undated) DEVICE — COVER,MAYO STAND,STERILE: Brand: MEDLINE

## (undated) DEVICE — GAUZE SPONGES,USP TYPE VII GAUZE, 12 PLY: Brand: CURITY

## (undated) DEVICE — TUBING IRR 16FT CNT WV 3 ASCP

## (undated) DEVICE — ADHESIVE MASTISOL 2/3ML

## (undated) DEVICE — STRL PENROSE DRAIN 18" X 1/4": Brand: CARDINAL HEALTH

## (undated) DEVICE — CHLORAPREP 26ML APPLICATOR

## (undated) DEVICE — CONVERTORS STOCKINETTE: Brand: CONVERTORS

## (undated) DEVICE — 10K/24K ARTHROSCOPY INFLOW TUBE SET: Brand: 10K/24K

## (undated) DEVICE — DISPOSABLE TOURNIQUET CUFF SINGLE BLADDER, DUAL PORT AND QUICK CONNECT CONNECTOR: Brand: COLOR CUFF

## (undated) DEVICE — 1910 FOAM BLOCK NEEDLE COUNTER: Brand: DEVON

## (undated) DEVICE — SLEEVE KENDALL SCD EXPRESS MED

## (undated) DEVICE — SUTURE VICRYL 0 CT-1

## (undated) DEVICE — OCCLUSIVE GAUZE STRIP OVERWRAP,3% BISMUTH TRIBROMOPHENATE IN PETROLATUM BLEND: Brand: XEROFORM

## (undated) DEVICE — 3M™ IOBAN™ 2 ANTIMICROBIAL INCISE DRAPE 6650EZ: Brand: IOBAN™ 2

## (undated) DEVICE — SHOULDER ARTHROSCOPY CDS-LF: Brand: MEDLINE INDUSTRIES, INC.

## (undated) DEVICE — SPLINT PRECUT ORTHOGLASS 3X12

## (undated) DEVICE — ABDOMINAL PAD: Brand: DERMACEA

## (undated) DEVICE — PADDING CAST COTTON  4

## (undated) DEVICE — UPPER EXTREMITY CDS-LF: Brand: MEDLINE INDUSTRIES, INC.

## (undated) DEVICE — GYN LAP CDS: Brand: MEDLINE INDUSTRIES, INC.

## (undated) DEVICE — SUTURE VICRYL 0 UR-6

## (undated) DEVICE — NEEDLE SPINAL 18X3-1/2 PINK.

## (undated) DEVICE — SUT PROLENE 6-0 RB-2 8714H

## (undated) DEVICE — SUTURE PASSER AR-4068-90

## (undated) DEVICE — PKS LYONS DISSECTING FORCEPS 5MM/33CM: Brand: PK TECHNOLOGY

## (undated) DEVICE — 3.3 MM INTEGRATED CABLE WAND ICW, COVATOR 20, 20 DEGREE: Brand: COBLATION

## (undated) DEVICE — 1200CC GUARDIAN II: Brand: GUARDIAN

## (undated) DEVICE — [AGGRESSIVE PLUS CUTTER, ARTHROSCOPIC SHAVER BLADE,  DO NOT RESTERILIZE,  DO NOT USE IF PACKAGE IS DAMAGED,  KEEP DRY,  KEEP AWAY FROM SUNLIGHT]: Brand: FORMULA

## (undated) DEVICE — WRAP COOLING SHLDR W/ICE PILLO

## (undated) DEVICE — SOL  .9 1000ML BTL

## (undated) DEVICE — GOWN,SIRUS,FABRIC-REINFORCED,X-LARGE: Brand: MEDLINE

## (undated) DEVICE — UNDYED BRAIDED (POLYGLACTIN 910), SYNTHETIC ABSORBABLE SUTURE: Brand: COATED VICRYL

## (undated) DEVICE — GOWN SURG AERO CHROME XXL

## (undated) DEVICE — SCD SLEEVE KNEE HI BLEND

## (undated) DEVICE — SHEET,DRAPE,70X100,STERILE: Brand: MEDLINE

## (undated) DEVICE — STERILE POLYISOPRENE POWDER-FREE SURGICAL GLOVES: Brand: PROTEXIS

## (undated) DEVICE — SUTURE VICRYL 0

## (undated) DEVICE — KIT TRC TRIMANO BEACH CHR ARM

## (undated) DEVICE — STERILE-Z STAND AND BACK TABLE COVER 66IN X 95IN: Brand: STERILE-Z

## (undated) DEVICE — ENDOPATH XCEL DILATING TIP TROCARS WITH STABILITY SLEEVES: Brand: ENDOPATH XCEL

## (undated) DEVICE — SUTURE ETHILON 3-0 PS-1

## (undated) DEVICE — GYN CDS: Brand: MEDLINE INDUSTRIES, INC.

## (undated) DEVICE — MANIPULATOR UTER ADVINC 3.5

## (undated) DEVICE — #15 STERILE STAINLESS BLADE: Brand: STERILE STAINLESS BLADES

## (undated) DEVICE — FIBERWIRE 2.0 AR-7220

## (undated) DEVICE — DRAPE HALF 40X58 DYNJP2410

## (undated) DEVICE — CASED DISP BIPOLAR CORD

## (undated) DEVICE — SOLUTION  .9 1000ML BTL

## (undated) DEVICE — TUBING DW OUTFLOW

## (undated) DEVICE — SUTURE TIGERLOOP #2

## (undated) DEVICE — WIRE K SMALL .054
Type: IMPLANTABLE DEVICE | Status: NON-FUNCTIONAL
Removed: 2022-07-14

## (undated) DEVICE — ENDOPATH XCEL UNIVERSAL TROCAR STABLILITY SLEEVES: Brand: ENDOPATH XCEL

## (undated) DEVICE — 3M™ STERI-STRIP™ REINFORCED ADHESIVE SKIN CLOSURES, R1547, 1/2 IN X 4 IN (12 MM X 100 MM), 6 STRIPS/ENVELOPE: Brand: 3M™ STERI-STRIP™

## (undated) DEVICE — SPK10022 SCHLEIN POSITIONING KIT: Brand: SPK10022 SCHLEIN POSITIONING KIT

## (undated) DEVICE — SOL  .9 3000ML

## (undated) DEVICE — STANDARD HYPODERMIC NEEDLE,POLYPROPYLENE HUB: Brand: MONOJECT

## (undated) DEVICE — STERILE SYNTHETIC POLYISOPRENE POWDER-FREE SURGICAL GLOVES WITH HYDROGEL COATING, SMOOTH FINISH, STRAIGHT FINGER: Brand: PROTEXIS

## (undated) DEVICE — GLOVE SURG TRIUMPH SZ 8

## (undated) DEVICE — SUT ETHILON 4-0 PS-2 1667H

## (undated) DEVICE — DRESSING COBAN 1" TAN

## (undated) DEVICE — KENDALL SCD EXPRESS SLEEVES, THIGH LENGTH, LARGE: Brand: KENDALL SCD

## (undated) DEVICE — FORCEP BIOPSY RJ4 LG CAP W/ND

## (undated) DEVICE — 1010 S-DRAPE TOWEL DRAPE 10/BX: Brand: STERI-DRAPE™

## (undated) DEVICE — BANDAGE FLEX ADH CURITY 2X3

## (undated) DEVICE — ENDOSCOPY PACK - LOWER: Brand: MEDLINE INDUSTRIES, INC.

## (undated) DEVICE — 3M™ RED DOT™ MONITORING ELECTRODE WITH FOAM TAPE AND STICKY GEL, 50/BAG, 20/CASE, 72/PLT 2570: Brand: RED DOT™

## (undated) DEVICE — SUPER TURBOVAC 90 IFS: Brand: COBLATION

## (undated) DEVICE — SHOULDER CANNULA SET WITHOUT FENESTRATIONS, 5.5 MM (I.D.) X 70 MM: Brand: CONMED

## (undated) DEVICE — KENDALL SCD EXPRESS SLEEVES, KNEE LENGTH, MEDIUM: Brand: KENDALL SCD

## (undated) DEVICE — STRYKER HARMONIC 36CM REPRCSED

## (undated) DEVICE — 40580 - THE PINK PAD - ADVANCED TRENDELENBURG POSITIONING KIT: Brand: 40580 - THE PINK PAD - ADVANCED TRENDELENBURG POSITIONING KIT

## (undated) DEVICE — SUT SILK 0 SH K834H

## (undated) DEVICE — GLOVE SURG SENSICARE SZ 7-1/2

## (undated) DEVICE — WRAP ARM SUPPORT MCCONNELL

## (undated) DEVICE — SPLINT PLASTER 4

## (undated) DEVICE — MEGADYNE ELECTRODE ADULT PT RT

## (undated) NOTE — LETTER
Last Revised 02/07/06  Obstructive Sleep Apnea Questionnaire    Clinical signs and symptoms suggesting the possibility of GARETH    1. Predisposing physical characteristics (positive with any of the following present)  ? BMI 35kg/m²  ?  Craniofacial abnormalit pauses which are frightening to the observer, patient regularly falls asleep within minutes after being left unstimulated) in which case they should be treated as though they have severe sleep apnea.     The sleep laboratory’s assessment (none, mild, modera Point Total for B           C. Requirement for postoperative opioids.                Opioid requirement             Points   None 0    Low dose oral opiod 1    High dose oral opioids, parenteral or neuraxial opiods 3      Point Total for C        Estimation

## (undated) NOTE — ED AVS SNAPSHOT
[facility] Emergency Department    [place]  [address] [address]    Phone:  [phone]    Fax:  [phone]           Denise Fagan   MRN: [id number]    Department:  [facility] Emergency Department   Date of Visit: take both doses of the new and old medication. ONLY take the dose prescribed today.             Where to Get Your Medications      You can get these medications from any pharmacy     Bring a paper prescription for each of these medications    - HYDROcodone You were examined and treated today on an urgent basis only. This was not a substitute for ongoing medical care. Often, one Emergency Department visit does not uncover every injury or illness.  If you have been referred to a primary care or a specialist ph [place] [address] ([address]) [address] [facility] [place] [address] [phone] [address]) [phone]                Additional Information       We are concerned

## (undated) NOTE — ED AVS SNAPSHOT
[facility] Emergency Department    [place] [address]    Phone:  [phone]    Fax:  [phone]           Driss Pardo   MRN: [id number]    Department:  [facility] Emergency Department   Date of Visit: IF THERE IS ANY CHANGE OR WORSENING OF YOUR CONDITION, CALL YOUR PRIMARY CARE PHYSICIAN AT ONCE OR RETURN IMMEDIATELY TO THE EMERGENCY DEPARTMENT.     If you have been prescribed any medication(s), please fill your prescription right away and begin taking t

## (undated) NOTE — ED AVS SNAPSHOT
[facility] Emergency Department    [facility]  [address] [address]    Phone:  [phone]    Fax:  [phone]           Lottie Bhatt   MRN: [id number]    Department:  [facility] Emergency Department   Date of Visit: IF THERE IS ANY CHANGE OR WORSENING OF YOUR CONDITION, CALL YOUR PRIMARY CARE PHYSICIAN AT ONCE OR RETURN IMMEDIATELY TO THE EMERGENCY DEPARTMENT.     If you have been prescribed any medication(s), please fill your prescription right away and begin taking t

## (undated) NOTE — ED AVS SNAPSHOT
[facility] Emergency Department    [place]  [address] [address]    Phone:  [phone]    Fax:  [phone]           Phoebe Stan   MRN: [id number]    Department:  [facility] Emergency Department   Date of Visit: Medication List      Notice     You have not been prescribed any medications.             Discharge References/Attachments     SPRAIN, ANKLE (ADULT) (ENGLISH)      Disclosure     Insurance plans vary and the physician(s) referred by the ER may not be covere CARE PHYSICIAN AT ONCE OR RETURN IMMEDIATELY TO THE EMERGENCY DEPARTMENT.     If you have been prescribed any medication(s), please fill your prescription right away and begin taking the medication(s) as directed    If the emergency physician has read X-ray coverage. Patient [address] is a Federal Navigator program that can help with your Affordable Care Act coverage, as well as all types of Medicaid plans.   To get signed up and covered, please call [phone] and ask to get set up for an insuran

## (undated) NOTE — ED AVS SNAPSHOT
Misti Cameron   MRN: [id number]    Department:  [facility] Emergency Department   Date of Visit:  6/11/2018           Disclosure     Insurance plans vary and the physician(s) referred by the ER may not be covered by your plan.  Please contact y tell this physician (or your personal doctor if your instructions are to return to your personal doctor) about any new or lasting problems. The primary care or specialist physician will see patients referred from the [facility] Emergency Department.  Refugio Sotelo

## (undated) NOTE — ED AVS SNAPSHOT
[facility] Emergency Department    [place]  [address] [address]    Phone:  [phone]    Fax:  [phone]           Dong Caller   MRN: [id number]    Department:  [facility] Emergency Department   Date of Visit: IF THERE IS ANY CHANGE OR WORSENING OF YOUR CONDITION, CALL YOUR PRIMARY CARE PHYSICIAN AT ONCE OR RETURN IMMEDIATELY TO THE EMERGENCY DEPARTMENT.     If you have been prescribed any medication(s), please fill your prescription right away and begin taking t

## (undated) NOTE — ED AVS SNAPSHOT
[facility] Emergency Department    [place]  [address] [place] [address]    Phone:  [phone]    Fax:  [phone]           Haley Macedo   MRN: [id number]    Department:  [facility] Emergency Department   Date of Visit: IF THERE IS ANY CHANGE OR WORSENING OF YOUR CONDITION, CALL YOUR PRIMARY CARE PHYSICIAN AT ONCE OR RETURN IMMEDIATELY TO THE EMERGENCY DEPARTMENT.     If you have been prescribed any medication(s), please fill your prescription right away and begin taking t

## (undated) NOTE — ED AVS SNAPSHOT
Ermelinda White   MRN: [id number]    Department:  [facility] Emergency Department   Date of Visit:  11/29/2018           Disclosure     Insurance plans vary and the physician(s) referred by the ER may not be covered by your plan.  Please contact tell this physician (or your personal doctor if your instructions are to return to your personal doctor) about any new or lasting problems. The primary care or specialist physician will see patients referred from the [facility] Emergency Department.  Andrea Curtis

## (undated) NOTE — ED AVS SNAPSHOT
Patrice Ramirez   MRN: [id number]    Department:  [facility] Emergency Department   Date of Visit:  10/21/2019           Disclosure     Insurance plans vary and the physician(s) referred by the ER may not be covered by your plan.  Please contact tell this physician (or your personal doctor if your instructions are to return to your personal doctor) about any new or lasting problems. The primary care or specialist physician will see patients referred from the [facility] Emergency Department.  Aman Medina

## (undated) NOTE — ED AVS SNAPSHOT
[facility] Emergency Department    [place] [address]    Phone:  [phone]    Fax:  [phone]           Tonia Devin   MRN: [id number]    Department:  [facility] Emergency Department   Date of Visit: HARDY EKG with Glenn Crocker [address] [facility])    [facility]   [phone]            Apr 22, 2017 10:15 AM   BAKARI SALINAS  Lab with Glenn Crocker [address] ([address] [place] [address]   [phone]            Aug 14, 2017 12:30 PM   FOLLOW UP with Jose Juan Gaona MD   [facility] AT [address]              Medica Gentle massage and stretching of the affected areas. Warm pack, heating pad, or thermacare to sore areas. Naproxen 2 tablets with food twice a day for 3-5 days then as needed. Drink lots of water. Eat regular meals especially with your medications.   Ca primary care or specialist physician will see patients referred from the [facility] Emergency Department. Follow-up care is at the discretion of that Physician.     IF THERE IS ANY CHANGE OR WORSENING OF YOUR CONDITION, CALL YOUR PRIMARY CARE PHYSICIAN harming yourself, contact [facility] at [phone]. - If you don’t have insurance, You Hernandez has partnered with [employer] [address] to help you get signed up for insurance coverage.   Patient Barron

## (undated) NOTE — LETTER
AUTHORIZATION FOR SURGICAL OPERATION OR OTHER PROCEDURE    1. I hereby authorize Dr. Amber Leonard  and [place], [facility] staff assigned to my case to perform the following operation and/or procedure at the [facility], [facility]:    Cortisone injection in Right heel  _______________________________________________________________________________________________      _______________________________________________________________________________________________    2. My physician has explained the nature and purpose of the operation or other procedure, possible alternative methods of treatment, the risks involved, and the possibility of complication to me. I acknowledge that no guarantee has been made as to the result that may be obtained. 3.  I recognize that, during the course of this operation, or other procedure, unforseen conditions may necessitate additional or different procedure than those listed above. I, therefore, further authorize and request that the above named physician, his/her physician assistants or designees perform such procedures as are, in his/her professional opinion, necessary and desirable. 4.  Any tissue or organs removed in the operation or other procedure may be disposed of by and at the discretion of the [facility], [facility] and [facility] AT [facility]. 5.  I understand that in the event of a medical emergency, I will be transported by local paramedics to [facility] or other hospital emergency department. 6.  I certify that I have read and fully understand the above consent to operation and/or other procedure. 7.  I acknowledge that my physician has explained sedation/analgesia administration to me including the risks and benefits. I consent to the administration of sedation/analgesia as may be necessary or desirable in the judgement of my physician.     Witness signature: ___________________________________________________ Date:  ______/______/_____ Time:  ________ A. M.  P.M. Patient Name:  ______________________________________________________  (please print)      Patient signature:  ___________________________________________________             Relationship to Patient:           []  Parent    Responsible person                          []  Spouse  In case of minor or                    [] Other  _____________   Incompetent name:  __________________________________________________                               (please print)      _____________      Responsible person  In case of minor or  Incompetent signature:  _______________________________________________    Statement of Physician  My signature below affirms that prior to the time of the procedure, I have explained to the patient and/or his/her guardian, the risks and benefits involved in the proposed treatment and any reasonable alternative to the proposed treatment. I have also explained the risks and benefits involved in the refusal of the proposed treatment and have answered the patient's questions.                         Date:  ______/______/_______  Provider                      Signature:  __________________________________________________________       Time:  ___________ A.M    P.M.

## (undated) NOTE — LETTER
[facility] Testing Department  Phone: [phone]  OUTSIDE TESTING RESULT REQUEST      TO: Dr. Ev Craven              Today's Date: 1/22/21    FAX #: [phone]     IMPORTANT: FOR YOUR IMMEDIATE ATTENTION  Please FAX all test results listed bel

## (undated) NOTE — IP AVS SNAPSHOT
[facility] [place]  [address] [place], [address] ~ [phone]                Discharge Summary   5/5/2017    [facility]           Admission Information        Provider Department    5/5/2017 Fatou Maguire MD  3nw-A Morning Afternoon Evening As Needed    Acidophilus/Pectin Caps   Commonly known as:  PROBIOTIC   Next dose due:  5/7 MORNING        Take 1 capsule by mouth daily. CALCIUM OR   Next dose due:  5/7 MORNING        Take by mouth. YOU MAY EAT WHAT YOU FEEL LIKE, AVOID SPICY, GREASY FOODS OR LARGE MEALS FOR THE NEXT FEW DAY UNTIL YOUR BOWELS ARE MOVING NORMALLY    NO LIFTING MORE THAN 10 POUNDS, NO PUSHING OR PULLING, NO STRENUOUS ACTIVITY OR EXERCISE,   NO INTERCOURSE OR TAMPONS  [facility])    [place]   [phone]              Immunization History as of 5/6/2017  Reviewed on 5/5/2017    INFLUENZA 9/9/2016, 10/27/2015,  Deferred (Patient R Additional Information       We are concerned for your overall well being:    - If you are a smoker or have smoked in the last 12 months, we encourage you to explore options for quitting.     - If you have concerns related to behavioral health issues or th call your provider or healthcare team if you have any questions regarding your medications while at home.          Blood Producing Medications     IRON OR       Use: Increase blood cell counts   Most common side effects: Pain, fever, rash, fatigue, joint pa Use: Treat conditions such as depression and thought disorders   Most common side effects: Dizziness, drowsiness, problems with movement   What to report to your healthcare team: Changes in thinking, talking or movement, drowsiness, shaking           All

## (undated) NOTE — ED AVS SNAPSHOT
[facility] Emergency Department    [facility]  [address] [address]    Phone:  [phone]    Fax:  [phone]           Murfreesboroadelia Burgess   MRN: [id number]    Department:  [facility] Emergency Department   Date of Visit: IF THERE IS ANY CHANGE OR WORSENING OF YOUR CONDITION, CALL YOUR PRIMARY CARE PHYSICIAN AT ONCE OR RETURN IMMEDIATELY TO THE EMERGENCY DEPARTMENT.     If you have been prescribed any medication(s), please fill your prescription right away and begin taking t

## (undated) NOTE — ED AVS SNAPSHOT
[facility] Emergency Department    [facility]  [address] [address]    Phone:  [phone]    Fax:  [phone]           Arsh Tejada   MRN: [id number]    Department:  [facility] Emergency Department   Date of Visit: POSTOP with Jose Manuel Thomas MD   [facility] - [address] [facility] 2)    [address] [place] [address]  [address]            Jun 12, 2017  5:30 PM   POSTOP with Jose Manuel Thomas MD   [facility] coverage for follow-up care and referrals. [facility] "[facility] [employer]" [place] [phone]  Pediatric [phone] Emergency Department   [phone]       To Check ER Wait Times:  TEXT 'ERwait' to 10696      Click [email or web address] will be contacted. Please make sure we have your correct phone number before you leave. After you leave, you should follow the attached instructions. I have read and understand the instructions given to me by my caregivers.         [facility] You can access your MyChart to more actively manage your health care and view more details from this visit by going to [email or web address].   If you've recently had a stay at the Hospital you can access your discharge instructions in [address] by amanda